# Patient Record
Sex: MALE | Race: WHITE | NOT HISPANIC OR LATINO | Employment: FULL TIME | URBAN - METROPOLITAN AREA
[De-identification: names, ages, dates, MRNs, and addresses within clinical notes are randomized per-mention and may not be internally consistent; named-entity substitution may affect disease eponyms.]

---

## 2019-10-28 ENCOUNTER — TELEPHONE (OUTPATIENT)
Dept: SCHEDULING | Facility: IMAGING CENTER | Age: 49
End: 2019-10-28

## 2019-11-07 ENCOUNTER — OFFICE VISIT (OUTPATIENT)
Dept: MEDICAL GROUP | Facility: CLINIC | Age: 49
End: 2019-11-07
Payer: COMMERCIAL

## 2019-11-07 VITALS
BODY MASS INDEX: 28.28 KG/M2 | SYSTOLIC BLOOD PRESSURE: 138 MMHG | WEIGHT: 202 LBS | TEMPERATURE: 98 F | HEIGHT: 71 IN | HEART RATE: 65 BPM | RESPIRATION RATE: 14 BRPM | OXYGEN SATURATION: 99 % | DIASTOLIC BLOOD PRESSURE: 92 MMHG

## 2019-11-07 DIAGNOSIS — Z28.20 VACCINE REFUSED BY PATIENT: ICD-10-CM

## 2019-11-07 DIAGNOSIS — Z12.11 SCREENING FOR COLON CANCER: ICD-10-CM

## 2019-11-07 DIAGNOSIS — Z76.89 ESTABLISHING CARE WITH NEW DOCTOR, ENCOUNTER FOR: ICD-10-CM

## 2019-11-07 DIAGNOSIS — F41.1 GAD (GENERALIZED ANXIETY DISORDER): ICD-10-CM

## 2019-11-07 PROCEDURE — 99203 OFFICE O/P NEW LOW 30 MIN: CPT | Performed by: NURSE PRACTITIONER

## 2019-11-07 RX ORDER — HYDROXYZINE PAMOATE 25 MG/1
CAPSULE ORAL
Refills: 0 | COMMUNITY
Start: 2019-09-30 | End: 2019-11-07 | Stop reason: SDUPTHER

## 2019-11-07 RX ORDER — HYDROXYZINE PAMOATE 25 MG/1
25 CAPSULE ORAL 3 TIMES DAILY PRN
Qty: 90 CAP | Refills: 1 | Status: SHIPPED | OUTPATIENT
Start: 2019-11-07 | End: 2020-08-11 | Stop reason: SDUPTHER

## 2019-11-08 NOTE — PATIENT INSTRUCTIONS
Your medical care was provided today by: GIGI Fernandez    Thank You for the opportunity to serve you.    You may receive a brief survey in the mail shortly regarding your visit today. Please take a few moments to complete the survey and return it; no postage is necessary. We are working to serve our patient population better, improve customer service and our patients overall experience and your input can help us to accomplish this. We thank you for your help and for the opportunity to serve you today and in the future.     Labs and Diagnostic Testing   Please note that if we have ordered labs or diagnostic testing, those results may be released to you on HERMEL DELORt prior to my review. While we do our best to review your results as soon as possible, there are times when you may see your results prior to provider review. Of course, if you ever have any questions or concerns about your results, please contact our clinic.     Special Instructions:  Always call 9-1-1 immediately if you develop a life threatening emergency.    Unless told otherwise please take all medications as directed and complete prescription therapies.     Watch for the following signs that require additional evaluation: progressive lethargy or unresponsiveness, localized pain (chest, abdomen), shortness of breath, painful breathing, progressive vomiting with weakness, bloody stools, or new rash.     If you are prescribed pain medication or any other medication that is sedating, do not take medication before or while operating a vehicle or heavy machinery or equipment due to potential side effects such as drowsiness and/or dizziness.

## 2019-11-08 NOTE — ASSESSMENT & PLAN NOTE
This is a chronic problem for which patient has been prescribed Zoloft 50 mg daily and also as needed hydroxyzine.  Patient reports that while he does feel like the Zoloft has helped his anxiety, he does believe it could be increased to help him better control his anxiety.  Denies any suicidal ideation.  He does request a refill of his medication today.

## 2019-11-08 NOTE — PROGRESS NOTES
Subjective:     Keenan Morgan is a 48 y.o. male here today for evaluation and management the following:        EVIE (generalized anxiety disorder)  This is a chronic problem for which patient has been prescribed Zoloft 50 mg daily and also as needed hydroxyzine.  Patient reports that while he does feel like the Zoloft has helped his anxiety, he does believe it could be increased to help him better control his anxiety.  Denies any suicidal ideation.  He does request a refill of his medication today.       Current medicines (including changes today)  Current Outpatient Medications   Medication Sig Dispense Refill   • sertraline (ZOLOFT) 50 MG Tab Take 2 Tabs by mouth every day. 180 Tab 1   • hydrOXYzine pamoate (VISTARIL) 25 MG Cap Take 1 Cap by mouth 3 times a day as needed for Itching. 90 Cap 1     No current facility-administered medications for this visit.        He  has a past medical history of Hypertension and Pain.    He  has a past surgical history that includes hip arthroscopy (Left, 12/2/2016); femoral neck osteoplasty (Left, 12/2/2016); and acetabular osteoplasty (Left, 12/2/2016).     Social History     Socioeconomic History   • Marital status:      Spouse name: Not on file   • Number of children: Not on file   • Years of education: Not on file   • Highest education level: Not on file   Occupational History   • Not on file   Social Needs   • Financial resource strain: Not on file   • Food insecurity:     Worry: Not on file     Inability: Not on file   • Transportation needs:     Medical: Not on file     Non-medical: Not on file   Tobacco Use   • Smoking status: Never Smoker   • Smokeless tobacco: Current User     Types: Snuff, Chew   • Tobacco comment: 2 cans/week; used since age 6 years    Substance and Sexual Activity   • Alcohol use: Not Currently     Comment: 6-8 beers per day, stopped alcohol 2011   • Drug use: No   • Sexual activity: Yes   Lifestyle   • Physical activity:      "Days per week: Not on file     Minutes per session: Not on file   • Stress: Not on file   Relationships   • Social connections:     Talks on phone: Not on file     Gets together: Not on file     Attends Scientologist service: Not on file     Active member of club or organization: Not on file     Attends meetings of clubs or organizations: Not on file     Relationship status: Not on file   • Intimate partner violence:     Fear of current or ex partner: Not on file     Emotionally abused: Not on file     Physically abused: Not on file     Forced sexual activity: Not on file   Other Topics Concern   • Not on file   Social History Narrative   • Not on file       Family History   Family history unknown: Yes         ROS  Positive for anxiety, denies suicidal ideation or depression.  No fever, no chest pain, no shortness of breath, no abdominal pain, no rashes    All other systems reviewed and are negative.        Objective:     /92 (BP Location: Left arm, Patient Position: Sitting, BP Cuff Size: Adult)   Pulse 65   Temp 36.7 °C (98 °F) (Temporal)   Resp 14   Ht 1.803 m (5' 11\")   Wt 91.6 kg (202 lb)   SpO2 99%  Body mass index is 28.17 kg/m².    Physical Exam:   Constitutional: Alert, no distress.  Eye: Equal, round and reactive, conjunctiva clear, lids normal.   ENMT: Lips without lesions, oropharynx clear.   Neck: Trachea midline, no masses, no thyromegaly. No cervical or supraclavicular lymphadenopathy  Respiratory: Unlabored respiratory effort, lungs clear to auscultation, no wheezes, no ronchi.  Cardiovascular: Normal S1, S2, no murmur, no edema.   Abdomen: Soft, non-tender, no masses, no hepatosplenomegaly. Normal bowel sounds.   Skin: Warm, dry, good turgor, no rashes in visible areas.  Neuro:  normal sensation in extremities.   Psych: Alert and oriented x3, normal affect and mood.        Assessment and Plan:   The following treatment plan was discussed    1. Establishing care with new doctor, encounter " for    2. EVIE (generalized anxiety disorder)  Discussed with patient risks and benefits of increasing medication.  At this time patient will increase to Zoloft 100 mg daily.  He may trial taking just 1-1/2 pills for the next 6 weeks to trial 75 mg daily.  If symptoms could still use improving he will increase to 100 mg.  Discussed possible side effects.  Advised patient to complete fasting labs although he is not sure he will be able to due to his cost.  He will follow-up in 3 to 6 months or as needed.  - TSH WITH REFLEX TO FT4; Future  - Lipid Profile; Future  - Comp Metabolic Panel; Future  - HEMOGLOBIN A1C; Future  - sertraline (ZOLOFT) 50 MG Tab; Take 2 Tabs by mouth every day.  Dispense: 180 Tab; Refill: 1  - hydrOXYzine pamoate (VISTARIL) 25 MG Cap; Take 1 Cap by mouth 3 times a day as needed for Itching.  Dispense: 90 Cap; Refill: 1    3. Screening for colon cancer  - OCCULT BLOOD FECES IMMUNOASSAY (FIT); Future    4. Vaccine refused by patient  Declines all recommended vaccines.       Reviewed indication, dosage, usage and potential adverse effects of prescribed medications. Patient appears to understand, verbalizes understanding and is willing to try medications as prescribed.      Reviewed risks and benefits of treatment plan. Patient verbally agrees to plan of care.       Followup: Return in about 3 months (around 2/7/2020).    [email or web address].     PLEASE NOTE: This dictation was created using voice recognition software. I have made every reasonable attempt to correct obvious errors, but I expect that there may be errors of grammar and possibly content that I did not discover prior finalizing this note.

## 2020-04-07 ENCOUNTER — TELEPHONE (OUTPATIENT)
Dept: HEALTH INFORMATION MANAGEMENT | Facility: OTHER | Age: 50
End: 2020-04-07

## 2020-04-07 ENCOUNTER — OFFICE VISIT (OUTPATIENT)
Dept: URGENT CARE | Facility: MEDICAL CENTER | Age: 50
End: 2020-04-07
Payer: COMMERCIAL

## 2020-04-07 VITALS — HEART RATE: 44 BPM | BODY MASS INDEX: 33.99 KG/M2 | HEIGHT: 61 IN | WEIGHT: 180 LBS

## 2020-04-07 DIAGNOSIS — F17.200 NICOTINE DEPENDENCE WITH CURRENT USE: ICD-10-CM

## 2020-04-07 DIAGNOSIS — R05.9 COUGH: ICD-10-CM

## 2020-04-07 DIAGNOSIS — J01.40 ACUTE PANSINUSITIS, RECURRENCE NOT SPECIFIED: ICD-10-CM

## 2020-04-07 PROBLEM — F41.9 ANXIETY: Status: ACTIVE | Noted: 2019-02-01

## 2020-04-07 PROBLEM — Z80.42 FAMILY HISTORY OF MALIGNANT NEOPLASM OF PROSTATE: Status: ACTIVE | Noted: 2019-02-01

## 2020-04-07 PROCEDURE — 99203 OFFICE O/P NEW LOW 30 MIN: CPT | Mod: 95,CR | Performed by: PHYSICIAN ASSISTANT

## 2020-04-07 RX ORDER — BENZONATATE 100 MG/1
200 CAPSULE ORAL 3 TIMES DAILY PRN
Qty: 60 CAP | Refills: 0 | Status: SHIPPED | OUTPATIENT
Start: 2020-04-07 | End: 2020-08-11

## 2020-04-07 RX ORDER — DOXYCYCLINE HYCLATE 100 MG
100 TABLET ORAL 2 TIMES DAILY
Qty: 10 TAB | Refills: 0 | Status: SHIPPED | OUTPATIENT
Start: 2020-04-07 | End: 2020-04-12

## 2020-04-07 NOTE — TELEPHONE ENCOUNTER
1. Caller Name: Keenan Morgan                     Call Back Number: [phone]  [facility] PCP or Specialty Provider: No            2.  Does patient have any active symptoms of respiratory illness (fever OR cough OR shortness of breath OR sore throat)? Yes, the patient reports the following respiratory symptoms: cough and congestion x 1 month.    3.  Does patient have any comoribidities? None     4.  Has the patient traveled in the last 14 days OR had any known contact with someone who is suspected or confirmed to have COVID-19?  No.    5. Disposition: Offered patient virtual visit to limit potential exposure to others; patient also given self care instructions    Note routed to [facility] Provider: VONDA only.

## 2020-04-07 NOTE — PROGRESS NOTES
"Subjective:     Keenan Morgan  is a 49 y.o. male who presents for Cough (congestion x 1 mo)       HPI      ROS  Allergies   Allergen Reactions   • Aspirin Anaphylaxis   • Guaifenesin Anaphylaxis   • Penicillin [Penicillin G Potassium] Anaphylaxis     Past medical history, family history, surgical history and social history are reviewed and updated in the record today.     Objective:   Pulse (!) 44 Comment: pt took manual  Ht 1.549 m (5' 1\")   Wt 81.6 kg (180 lb)   BMI 34.01 kg/m²   Physical Exam     Assessment/Plan:   1. Acute pansinusitis, recurrence not specified  - doxycycline (VIBRAMYCIN) 100 MG Tab; Take 1 Tab by mouth 2 times a day for 5 days.  Dispense: 10 Tab; Refill: 0    2. Cough  - benzonatate (TESSALON) 100 MG Cap; Take 2 Caps by mouth 3 times a day as needed.  Dispense: 60 Cap; Refill: 0    3. Nicotine dependence with current use    Differential diagnosis, natural history, supportive care, and indications for immediate follow-up discussed.  Red flag warning symptoms and strict ER/follow-up precautions given.  The patient demonstrated a good understanding and agreed with the treatment plan.  Please note that this note was created using voice recognition speech to text software. Every effort has been made to correct obvious errors.  However, I expect there are errors of grammar and possibly context that were not discovered prior to finalizing the note  AGUSTIN Beck PA-C  "

## 2020-04-07 NOTE — PROGRESS NOTES
Telemedicine Visit: Established Patient     This encounter was conducted via Zoom .   Verbal consent was obtained. Patient's identity was verified.    Subjective:   CC:   Nasal congestion and cough x 1 month    Keenan Morgan is a 49 y.o. male presenting for evaluation and management of:    Mr. Morgan has seen via telemedicine on platform  noted above.  Patient reports 1 month history of nasal congestion with facial pain and pressure and nonproductive cough.  Patient denies fever, chills, shortness of breath.  Over-the-counter DayQuil with minimal improvement in symptoms.He states that his wife babysits in the home and prior to the onset of his symptoms, several of the children were being treated for sinus infections.       Patient does chew tobacco.     Denies travel outside of the area in the past 14 days.  Patient has had no known exposure to confirmed or suspected carla 30 minutese of COVID-19    ROS   Denies any recent fevers or chills. No nausea or vomiting. No chest pains or shortness of breath.       Allergies   Allergen Reactions   • Aspirin Anaphylaxis   • Guaifenesin Anaphylaxis   • Penicillin [Penicillin G Potassium] Anaphylaxis       Current medicines (including changes today)  Current Outpatient Medications   Medication Sig Dispense Refill   • doxycycline (VIBRAMYCIN) 100 MG Tab Take 1 Tab by mouth 2 times a day for 5 days. 10 Tab 0   • benzonatate (TESSALON) 100 MG Cap Take 2 Caps by mouth 3 times a day as needed. 60 Cap 0   • sertraline (ZOLOFT) 50 MG Tab Take 2 Tabs by mouth every day. 180 Tab 1   • hydrOXYzine pamoate (VISTARIL) 25 MG Cap Take 1 Cap by mouth 3 times a day as needed for Itching. (Patient not taking: Reported on 4/7/2020) 90 Cap 1     No current facility-administered medications for this visit.        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  He  has a past surgical history that includes hip arthroscopy (Left, 12/2/2016); femoral neck osteoplasty (Left, 12/2/2016); and acetabular osteoplasty (Left, 12/2/2016).       Objective:   Vitals obtained by patient:  Pulse (!) 44 Comment: pt took manual  Ht 1.549 m (5' 1\")   Wt 81.6 kg (180 lb)   BMI 34.01 kg/m²       Physical Exam:  Constitutional: Alert, no distress, well-groomed. Patient does sound as if he is experiencing nasal congestion  Skin: No rashes in visible areas.  Eye: Round. Conjunctiva clear, lids normal. No icterus.   ENMT: Lips pink without lesions, good dentition, moist mucous membranes. Phonation normal.  Neck: No masses, no thyromegaly. Moves freely without pain.  CV: Pulse as reported by patient  Respiratory: Unlabored respiratory effort, no cough or audible wheeze  Psych: Alert and oriented x3, normal affect and mood.       Assessment and Plan:   The following treatment plan was discussed:     1. Acute pansinusitis, recurrence not specified  - doxycycline (VIBRAMYCIN) 100 MG Tab; Take 1 Tab by mouth 2 times a day for 5 days.  Dispense: 10 Tab; Refill: 0    2. Cough  - benzonatate (TESSALON) 100 MG Cap; Take 2 Caps by mouth 3 times a day as needed.  Dispense: 60 Cap; Refill: 0    3. Nicotine dependence with current use    Patient will be treated with doxycycline as above (allergic to penicillin).  Recommend nasal saline rinse and over-the-counter Flonase nasal spray.  He is given Tessalon Perles as needed for cough.  Printed information on self-isolation will be provided in [facility] for patient's review.  Counseled on the importance of nicotine elimination or cessation.       Follow-up: Return in about 1 week (around 4/14/2020), or if symptoms worsen or fail to improve.    Red flag warning symptoms and " strict ER/follow-up precautions given.  The patient demonstrated a good understanding and agreed with the treatment plan.  Please note that this note was created using voice recognition speech to text software. Every effort has been made to correct obvious errors.  However, I expect there are errors of grammar and possibly context that were not discovered prior to finalizing the note  AGUSTIN Beck PA-C

## 2020-05-04 DIAGNOSIS — F41.1 GAD (GENERALIZED ANXIETY DISORDER): ICD-10-CM

## 2020-05-04 NOTE — TELEPHONE ENCOUNTER
Was the patient seen in the last year in this department? Yes    Does patient have an active prescription for medications requested? Yes    Received Request Via: Pharmacy    No visits with results within 1 Year(s) from this visit.   Latest known visit with results is:   Admission on 11/11/2008, Discharged on 11/12/2008   Component Date Value   • Sodium 11/12/2008 136    • Potassium 11/12/2008 4.1    • Chloride 11/12/2008 99    • Co2 11/12/2008 28    • Glucose 11/12/2008 113*   • Bun 11/12/2008 6*   • Creatinine 11/12/2008 0.8    • Calcium 11/12/2008 10.4*   • Albumin 11/12/2008 4.5    • Total Protein 11/12/2008 7.5    • Globulin 11/12/2008 3.0    • A-G Ratio 11/12/2008 1.5    • AST(SGOT) 11/12/2008 43    • ALT(SGPT) 11/12/2008 51*   • Alkaline Phosphatase 11/12/2008 19*   • Total Bilirubin 11/12/2008 0.6    • WBC 11/12/2008 3.5*   • RBC 11/12/2008 4.45*   • Hemoglobin 11/12/2008 15.0    • Hematocrit 11/12/2008 42.4    • MCV 11/12/2008 95.2    • MCH 11/12/2008 33.7*   • MCHC 11/12/2008 35.4*   • RDW 11/12/2008 13.3    • Platelet Count 11/12/2008 236    • MPV 11/12/2008 7.5    • Neutrophils-Polys 11/12/2008 50.7    • Lymphocytes 11/12/2008 35.7    • Monocytes 11/12/2008 11.5*   • Eosinophils 11/12/2008 0.9    • Basophils 11/12/2008 1.2    • RBC Morphology 11/12/2008 Normal    • Lipase 11/12/2008 59*   • Troponin I 11/12/2008 <0.01    • CK-Mb 11/12/2008 0.4    • D-Dimer Screen 11/12/2008 <200    • Troponin I 11/12/2008 <0.01    • Free T-4 11/12/2008 0.95    • TSH 11/12/2008 1.460    • Cholesterol,Tot 11/12/2008 200*   • Triglycerides 11/12/2008 45    • HDL 11/12/2008 135*   • LDL 11/12/2008 56    • Sed Rate [facility] 11/12/2008 3    • Sed Rate [facility] 11/12/2008 4    • Troponin I 11/12/2008 0.02    ]

## 2020-08-11 ENCOUNTER — OFFICE VISIT (OUTPATIENT)
Dept: MEDICAL GROUP | Facility: CLINIC | Age: 50
End: 2020-08-11
Payer: COMMERCIAL

## 2020-08-11 VITALS
BODY MASS INDEX: 30.38 KG/M2 | TEMPERATURE: 99.2 F | RESPIRATION RATE: 16 BRPM | HEART RATE: 61 BPM | SYSTOLIC BLOOD PRESSURE: 128 MMHG | HEIGHT: 71 IN | WEIGHT: 217 LBS | DIASTOLIC BLOOD PRESSURE: 68 MMHG | OXYGEN SATURATION: 97 %

## 2020-08-11 DIAGNOSIS — F41.1 GENERALIZED ANXIETY DISORDER: ICD-10-CM

## 2020-08-11 DIAGNOSIS — J01.00 ACUTE NON-RECURRENT MAXILLARY SINUSITIS: ICD-10-CM

## 2020-08-11 DIAGNOSIS — Z00.00 WELLNESS EXAMINATION: ICD-10-CM

## 2020-08-11 PROBLEM — F41.9 ANXIETY: Status: RESOLVED | Noted: 2019-02-01 | Resolved: 2020-08-11

## 2020-08-11 PROCEDURE — 99213 OFFICE O/P EST LOW 20 MIN: CPT | Performed by: PHYSICIAN ASSISTANT

## 2020-08-11 RX ORDER — DOXYCYCLINE HYCLATE 100 MG
100 TABLET ORAL 2 TIMES DAILY
Qty: 14 TAB | Refills: 0 | Status: SHIPPED | OUTPATIENT
Start: 2020-08-11 | End: 2020-09-08

## 2020-08-11 RX ORDER — HYDROXYZINE PAMOATE 25 MG/1
25 CAPSULE ORAL 3 TIMES DAILY PRN
Qty: 90 CAP | Refills: 1 | Status: SHIPPED | OUTPATIENT
Start: 2020-08-11 | End: 2021-09-22

## 2020-08-11 ASSESSMENT — PATIENT HEALTH QUESTIONNAIRE - PHQ9: CLINICAL INTERPRETATION OF PHQ2 SCORE: 0

## 2020-08-11 NOTE — ASSESSMENT & PLAN NOTE
It is been more than a year since the patient has had his lab work completed.  I have ordered new fasting labs to be drawn at his convenience with follow-up in 1 month for results.

## 2020-08-11 NOTE — ASSESSMENT & PLAN NOTE
This is a new condition for this patient.  Sinus pain and pressure started approximately 1 week ago.  Has tried over-the-counter sinus medication without relief.  Tender to palpation over maxillary sinuses, does not radiate to frontal sinus.  Denies fever, sore throat, or ear pain.  Will prescribe [id number] mg twice a day for 7 days.  Recommend saline nasal spray to help loosen mucus.

## 2020-08-11 NOTE — PROGRESS NOTES
Chief Complaint   Patient presents with   • Establish Care   • Sinus Problem     infection x 1 week        HISTORY OF PRESENT ILLNESS: Patient is a 49 y.o. male established patient who presents today to discuss the following issues:    Acute non-recurrent maxillary sinusitis  This is a new condition for this patient.  Sinus pain and pressure started approximately 1 week ago.  Has tried over-the-counter sinus medication without relief.  Tender to palpation over maxillary sinuses, does not radiate to frontal sinus.  Denies fever, sore throat, or ear pain.  Will prescribe [id number] mg twice a day for 7 days.  Recommend saline nasal spray to help loosen mucus.    EVIE (generalized anxiety disorder)  Chronic condition for this patient for which he takes sertraline 100 mg daily.  This is working well for him.  Hydroxyzine 25 mg tablets 3 times a day as needed.  States this is controlling his anxiety well and takes this medication only when necessary.  We will refill his prescriptions today.    Wellness examination  It is been more than a year since the patient has had his lab work completed.  I have ordered new fasting labs to be drawn at his convenience with follow-up in 1 month for results.      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 14 Tab 0   • hydrOXYzine pamoate (VISTARIL) 25 MG Cap Take 1 Cap by mouth 3 times a day as needed for Anxiety. 90 Cap 1   • sertraline (ZOLOFT) 50 MG Tab Take 2 Tabs by mouth every day. 180 Tab 3      No current facility-administered medications for this visit.        No visits with results within 6 Month(s) from this visit.   Latest known visit with results is:   Admission on 11/11/2008, Discharged on 11/12/2008   Component Date Value Ref Range Status   • Sodium 11/12/2008 136  135 - 145 mmol/L Final   • Potassium 11/12/2008 4.1  3.6 - 5.5 mmol/L Final   • Chloride 11/12/2008 99  96 - 112 mmol/L Final   • Co2 11/12/2008 28  20 - 33 mmol/L Final   • Glucose 11/12/2008 113* 70 - 110 mg/dL Final   • Bun 11/12/2008 6* 8 - 22 mg/dL Final   • Creatinine 11/12/2008 0.8  0.5 - 1.4 mg/dL Final   • Calcium 11/12/2008 10.4* 8.4 - 10.2 mg/dL Final   • Albumin 11/12/2008 4.5  3.2 - 4.9 g/dL Final   • Total Protein 11/12/2008 7.5  6.0 - 8.2 g/dL Final   • Globulin 11/12/2008 3.0  1.9 - 3.5 g/dL Final   • A-G Ratio 11/12/2008 1.5  g/dL Final   • AST(SGOT) 11/12/2008 43  12 - 45 U/L Final   • ALT(SGPT) 11/12/2008 51* 2 - 50 U/L Final   • Alkaline Phosphatase 11/12/2008 19* 30 - 99 U/L Final   • Total Bilirubin 11/12/2008 0.6  0.1 - 1.5 mg/dL Final   • WBC 11/12/2008 3.5* 4.8 - 10.8 K/uL Final   • RBC 11/12/2008 4.45* 4.70 - 6.10 M/uL Final   • Hemoglobin 11/12/2008 15.0  14.0 - 18.0 g/dL Final   • Hematocrit 11/12/2008 42.4  42.0 - 52.0 % Final   • MCV 11/12/2008 95.2  79.0 - 98.0 fL Final   • MCH 11/12/2008 33.7* 27.0 - 33.0 pg Final   • MCHC 11/12/2008 35.4* 30.0 - 35.0 g/dL Final   • RDW 11/12/2008 13.3  12.0 - 16.2 % Final   • Platelet Count 11/12/2008 236  164 - 446 K/uL Final   • MPV 11/12/2008 7.5  6.7 - 10.4 fL Final   • Neutrophils-Polys 11/12/2008 50.7  44.0 - 72.0 % Final   • Lymphocytes 11/12/2008 35.7  22.0 - 41.0 % Final   • Monocytes 11/12/2008 11.5* 1.0 - 9.0 % Final   • Eosinophils 11/12/2008 0.9  0.0 - 6.0 % Final   • Basophils 11/12/2008 1.2  0.0 - 2.0 % Final   • RBC Morphology 11/12/2008 Normal   Final   • Lipase 11/12/2008 59* 7 - 58 U/L Final   • Troponin I 11/12/2008 <0.01  0.01 - 0.04 ng/mL Final     Comment: The Ultra Troponin I has replaced the                           troponin assay.  The Ultra Troponin I is a highly sensitive                           assay.  Please note the change in the reference range.                           Interpretive Ranges based on WHO recommendations                           ___________________________________________________                           Normal ultra TNI:  <0.04 ng/mL                           Clinical Correlation Indicated:  0.04 - 0.78 ng/mL                           Suggestive of MI:  >0.78 ng/mL   • CK-Mb 11/12/2008 0.4  0.0 - 5.0 ng/mL Final   • D-Dimer Screen 11/12/2008 <200  <238 ng/mL Final    Comment: A negative D-Dimer result when combined with a clinical                           assessment of low probability has been shown to have a high                           negative predictive value for DVT or PE.                           This assay should not be used independently to exclude or                           diagnose DVT or Pulmonary Embolism.                           This test methodology does not differentiate between DVT and                           PE when an elevation in results is demonstrated.   • Troponin I 11/12/2008 <0.01  0.01 - 0.04 ng/mL Final    Comment: The Ultra Troponin I has replaced the                           troponin assay.  The Ultra Troponin I is a highly sensitive                           assay.  Please note the change in the reference range.                           Interpretive Ranges based on WHO recommendations                           ___________________________________________________                           Normal ultra TNI:  <0.04 ng/mL                           Clinical Correlation Indicated:  0.04 - 0.78 ng/mL                           Suggestive of MI:  >0.78 ng/mL   • Free T-4 11/12/2008 0.95  0.89 - 1.76 ng/dL Final   • TSH 11/12/2008 1.460  0.350 - 5.500 uIU/mL Final   • Cholesterol,Tot 11/12/2008 200*  100 - 199 mg/dL Final   • Triglycerides 11/12/2008 45  0 - 149 mg/dL Final   • HDL 11/12/2008 135* 40 - 59 mg/dL Final   • LDL 11/12/2008 56  <100 mg/dL Final    Comment: LDL cholesterol goals and cutpoints for therapeutic lifestyle                           changes (TLC) and drug therapy in different risk categories.                           ______________________________________________________________________                           Risk Category      LDL Goal      LDL Level to      LDL Level to                           Initiate TLC      Consider Drug                           Therapy                           ______________________________________________________________________                           CHD or CHD Risk    <100 mg/dL    >=100 mg/dL       >=130 mg/dL                           Equivalents                                        (100-129 mg/dL                           (10-year risk >20%)                                 drug optional)                           2+ Risk Factors    <130 mg/dL    >=130 mg/dL       10-year risk 10-20%:                           (10-year risk <=20%)                                 >=130 mg/dL                           -------------------                           10-year risk <10%:                           >=160 mg/dL                           0-1 Risk Factor    <160 mg/dL    >=160 mg/dL       >=190 mg/dL                           (10-year risk <10%)                                (160-189 mg/dL:                           LDL-lowering drug                           optional)                           Guidelines referenced from the National Cholesterol Education Program, ATP                           III guidelines, NIH Publication No. , May 2001.   • Sed Rate Westergren 11/12/2008 3  0 - 15 mm/hour Final   • Sed Rate Westergren 11/12/2008 4  0 - 15 mm/hour Final   • Troponin I 11/12/2008 0.02  0.01 - 0.04 ng/mL Final    Comment: The Ultra Troponin I has  replaced the                           troponin assay.  The Ultra Troponin I is a highly sensitive                           assay.  Please note the change in the reference range.                           Interpretive Ranges based on WHO recommendations                           ___________________________________________________                           Normal ultra TNI:  <0.04 ng/mL                           Clinical Correlation Indicated:  0.04 - 0.78 ng/mL                           Suggestive of MI:  >0.78 ng/mL   ]    The ASCVD Risk score (Jacques CHAVIS Jr., et al., 2013) failed to calculate for the following reasons:    Cannot find a previous HDL lab    Cannot find a previous total cholesterol lab    Social History     Tobacco Use   • Smoking status: Never Smoker   • Smokeless tobacco: Current User     Types: Snuff, Chew   • Tobacco comment: 2 cans/week; used since age 6 years    Substance Use Topics   • Alcohol use: Not Currently     Comment: 6-8 beers per day, stopped alcohol 2011   • Drug use: No       Family Status   Relation Name Status   • Mo unknown Alive   • Fa unknown Alive   • Bro unknown Alive     Family History   Family history unknown: Yes       No LMP for male patient.    Health Maintenance Summary                [facility] CAN[facility]ER SCREENING ANNUAL FIT Postponed 9/8/2020 Originally 12/30/2018. Patient Refused    IMM ZOSTER VACCINES Postponed 9/8/2020 Originally 12/30/2018. Patient Refused    IMM DTaP/Tdap/Td Vaccine Postponed 11/7/2020 Originally 12/30/1987. Patient Refused    IMM INFLUENZA Next Due 9/1/2020            Review of Systems:   Constitutional: Negative for fever, chills, weight change, fatigue, loss of appetite.  HENT: Positive for congestion, sinus pressure.  Negative for ear pain, nosebleeds, odynophagia, sore throat or changes in taste.    Eyes: Negative for vision changes.   Ears: Negative for recent changes in hearing, pain or discharge.  Neck: Negative for pain, swelling, lumps or  "goiter.  Respiratory: Negative for cough, sputum production, shortness of breath and wheezing.    Cardiovascular: Negative for chest pain, palpitations, orthopnea and leg swelling.   Gastrointestinal: Negative for constipation, diarrhea, heartburn, dysphagia, nausea, vomiting or abdominal pain.   Genitourinary: Negative for dysuria, urgency and frequency.   Musculoskeletal: Negative for myalgias, joint pain, and back pain.  Skin: Negative for skin, hair or nail changes, rash, itching.   Neurological: Negative for dizziness, tingling, tremors, sensory change, gait/coordination changes, focal weakness and headaches.   Endo/Heme/Allergies: Does not bruise/bleed easily.   Psychiatric/Behavioral: Negative for depression, suicidal ideas and memory loss.  The patient is not nervous/anxious and does not have insomnia.    All other systems reviewed and are negative except as in HPI.    Exam:  /68 (BP Location: Right arm, Patient Position: Sitting, BP Cuff Size: Adult)   Pulse 61   Temp 37.3 °C (99.2 °F) (Temporal)   Resp 16   Ht 1.803 m (5' 11\")   Wt 98.4 kg (217 lb)   SpO2 97%  Body mass index is 30.27 kg/m².  General:  Well nourished, obese male. No apparent distress.  Head: Grossly normal.  Eyes: EOM intact, PERRL, conjunctiva non-injected, sclera non-icteric.  Ears: Pam pinnae, external auditory canals, TM injected and dull bilaterally.    Neck: Supple with no cervical lymphadenopathy, JVD, palpable thyroid nodules or carotid bruits.  Pulmonary: Clear to ausculation bilaterally. Normal effort. No rales, ronchi, or wheezing.  Cardiovascular: Regular rate and rhythm without murmur, rub or gallop.   Extremities: Full range of motion. Warm and well perfused with no edema.  Skin: Intact with no obvious rashes or lesions.  Neuro: Grossly intact.  Psych: Alert and oriented x 3.  Appropriately dressed. Mood and affect appropriate.      Assessment/Plan:  1. Acute non-recurrent maxillary sinusitis  doxycycline " (VIBRAMYCIN) 100 MG Tab   2. EVIE (generalized anxiety disorder)  hydrOXYzine pamoate (VISTARIL) 25 MG Cap    sertraline (ZOLOFT) 50 MG Tab   3. Wellness examination  CBC WITH DIFFERENTIAL    Comp Metabolic Panel    HEMOGLOBIN A1C    TSH    FREE THYROXINE    VITAMIN D,25 HYDROXY    Lipid Profile       Reviewed risks and benefits of treatment plan. Patient verbally agrees to plan of care.     Return in about 4 weeks (around 9/8/2020), or if symptoms worsen or fail to improve, for labs.    Please note that this dictation was created using voice recognition software. I have made every reasonable attempt to correct obvious errors, but I expect that there are errors of grammar and possibly content that I did not discover before finalizing the note.

## 2020-08-11 NOTE — ASSESSMENT & PLAN NOTE
Chronic condition for this patient for which he takes sertraline 100 mg daily.  This is working well for him.  Hydroxyzine 25 mg tablets 3 times a day as needed.  States this is controlling his anxiety well and takes this medication only when necessary.  We will refill his prescriptions today.

## 2020-08-14 ENCOUNTER — NON-PROVIDER VISIT (OUTPATIENT)
Dept: MEDICAL GROUP | Facility: CLINIC | Age: 50
End: 2020-08-14
Payer: COMMERCIAL

## 2020-08-14 ENCOUNTER — HOSPITAL ENCOUNTER (OUTPATIENT)
Facility: MEDICAL CENTER | Age: 50
End: 2020-08-14
Attending: PHYSICIAN ASSISTANT
Payer: COMMERCIAL

## 2020-08-14 DIAGNOSIS — Z00.00 WELLNESS EXAMINATION: ICD-10-CM

## 2020-08-14 LAB
25(OH)D3 SERPL-MCNC: 23 NG/ML (ref 30–100)
ALBUMIN SERPL BCP-MCNC: 4.8 G/DL (ref 3.2–4.9)
ALBUMIN/GLOB SERPL: 1.9 G/DL
ALP SERPL-CCNC: 42 U/L (ref 30–99)
ALT SERPL-CCNC: 19 U/L (ref 2–50)
ANION GAP SERPL CALC-SCNC: 13 MMOL/L (ref 7–16)
AST SERPL-CCNC: 15 U/L (ref 12–45)
BASOPHILS # BLD AUTO: 0.9 % (ref 0–1.8)
BASOPHILS # BLD: 0.04 K/UL (ref 0–0.12)
BILIRUB SERPL-MCNC: 0.4 MG/DL (ref 0.1–1.5)
BUN SERPL-MCNC: 9 MG/DL (ref 8–22)
CALCIUM SERPL-MCNC: 9.4 MG/DL (ref 8.5–10.5)
CHLORIDE SERPL-SCNC: 96 MMOL/L (ref 96–112)
CHOLEST SERPL-MCNC: 183 MG/DL (ref 100–199)
CO2 SERPL-SCNC: 24 MMOL/L (ref 20–33)
CREAT SERPL-MCNC: 0.82 MG/DL (ref 0.5–1.4)
EOSINOPHIL # BLD AUTO: 0.17 K/UL (ref 0–0.51)
EOSINOPHIL NFR BLD: 3.7 % (ref 0–6.9)
ERYTHROCYTE [DISTWIDTH] IN BLOOD BY AUTOMATED COUNT: 42.9 FL (ref 35.9–50)
EST. AVERAGE GLUCOSE BLD GHB EST-MCNC: 108 MG/DL
GLOBULIN SER CALC-MCNC: 2.5 G/DL (ref 1.9–3.5)
GLUCOSE SERPL-MCNC: 108 MG/DL (ref 65–99)
HBA1C MFR BLD: 5.4 % (ref 0–5.6)
HCT VFR BLD AUTO: 46.5 % (ref 42–52)
HDLC SERPL-MCNC: 34 MG/DL
HGB BLD-MCNC: 15.4 G/DL (ref 14–18)
IMM GRANULOCYTES # BLD AUTO: 0.03 K/UL (ref 0–0.11)
IMM GRANULOCYTES NFR BLD AUTO: 0.7 % (ref 0–0.9)
LDLC SERPL CALC-MCNC: 118 MG/DL
LYMPHOCYTES # BLD AUTO: 1.72 K/UL (ref 1–4.8)
LYMPHOCYTES NFR BLD: 37.6 % (ref 22–41)
MCH RBC QN AUTO: 29.9 PG (ref 27–33)
MCHC RBC AUTO-ENTMCNC: 33.1 G/DL (ref 33.7–35.3)
MCV RBC AUTO: 90.3 FL (ref 81.4–97.8)
MONOCYTES # BLD AUTO: 0.36 K/UL (ref 0–0.85)
MONOCYTES NFR BLD AUTO: 7.9 % (ref 0–13.4)
NEUTROPHILS # BLD AUTO: 2.25 K/UL (ref 1.82–7.42)
NEUTROPHILS NFR BLD: 49.2 % (ref 44–72)
NRBC # BLD AUTO: 0 K/UL
NRBC BLD-RTO: 0 /100 WBC
PLATELET # BLD AUTO: 281 K/UL (ref 164–446)
PMV BLD AUTO: 10.7 FL (ref 9–12.9)
POTASSIUM SERPL-SCNC: 4.6 MMOL/L (ref 3.6–5.5)
PROT SERPL-MCNC: 7.3 G/DL (ref 6–8.2)
RBC # BLD AUTO: 5.15 M/UL (ref 4.7–6.1)
SODIUM SERPL-SCNC: 133 MMOL/L (ref 135–145)
T4 FREE SERPL-MCNC: 1.26 NG/DL (ref 0.93–1.7)
TRIGL SERPL-MCNC: 157 MG/DL (ref 0–149)
TSH SERPL DL<=0.005 MIU/L-ACNC: 1.25 UIU/ML (ref 0.38–5.33)
WBC # BLD AUTO: 4.6 K/UL (ref 4.8–10.8)

## 2020-08-14 PROCEDURE — 84439 ASSAY OF FREE THYROXINE: CPT

## 2020-08-14 PROCEDURE — 82306 VITAMIN D 25 HYDROXY: CPT

## 2020-08-14 PROCEDURE — 80061 LIPID PANEL: CPT

## 2020-08-14 PROCEDURE — 83036 HEMOGLOBIN GLYCOSYLATED A1C: CPT

## 2020-08-14 PROCEDURE — 85025 COMPLETE CBC W/AUTO DIFF WBC: CPT

## 2020-08-14 PROCEDURE — 84443 ASSAY THYROID STIM HORMONE: CPT

## 2020-08-14 PROCEDURE — 36415 COLL VENOUS BLD VENIPUNCTURE: CPT | Performed by: PHYSICIAN ASSISTANT

## 2020-08-14 PROCEDURE — 80053 COMPREHEN METABOLIC PANEL: CPT

## 2020-08-19 NOTE — RESULT ENCOUNTER NOTE
Please call the patient and let him know the following:    - Your vitamin D level is low.  I recommend taking over-the-counter vitamin D 2000 units daily.  - Your thyroid labs are within normal limits.  -  Your triglycerides and LDL are high from your cholesterol panel.  These can be controlled with cutting back on carbohydrates and increasing exercise.  We will try to manage this through diet and exercise before having the discussion about medication management.  -Your metabolic panel shows an increased fasting blood sugar.  All other levels are within normal limits.  Any other results that are highlighted have been reviewed and are nothing to be worried about.  The blood sugar level can also be controlled by diet and exercise.  – We will discuss this in further detail at your next appointment on September 8, 2020.

## 2020-09-08 ENCOUNTER — OFFICE VISIT (OUTPATIENT)
Dept: MEDICAL GROUP | Facility: CLINIC | Age: 50
End: 2020-09-08
Payer: COMMERCIAL

## 2020-09-08 VITALS
WEIGHT: 215 LBS | OXYGEN SATURATION: 98 % | HEART RATE: 54 BPM | SYSTOLIC BLOOD PRESSURE: 100 MMHG | DIASTOLIC BLOOD PRESSURE: 80 MMHG | HEIGHT: 71 IN | BODY MASS INDEX: 30.1 KG/M2 | TEMPERATURE: 98 F

## 2020-09-08 DIAGNOSIS — R09.81 CHRONIC NASAL CONGESTION: ICD-10-CM

## 2020-09-08 DIAGNOSIS — Z00.00 WELLNESS EXAMINATION: ICD-10-CM

## 2020-09-08 DIAGNOSIS — Z80.42 FAMILY HISTORY OF MALIGNANT NEOPLASM OF PROSTATE: ICD-10-CM

## 2020-09-08 DIAGNOSIS — E55.9 VITAMIN D DEFICIENCY: ICD-10-CM

## 2020-09-08 DIAGNOSIS — F41.1 GENERALIZED ANXIETY DISORDER: ICD-10-CM

## 2020-09-08 PROBLEM — J01.00 ACUTE NON-RECURRENT MAXILLARY SINUSITIS: Status: RESOLVED | Noted: 2020-08-11 | Resolved: 2020-09-08

## 2020-09-08 PROCEDURE — 99213 OFFICE O/P EST LOW 20 MIN: CPT | Performed by: PHYSICIAN ASSISTANT

## 2020-09-08 ASSESSMENT — FIBROSIS 4 INDEX: FIB4 SCORE: 0.62

## 2020-09-08 NOTE — ASSESSMENT & PLAN NOTE
Patient had a maxillary sinusitis on his last visit which has since resolved after course of antibiotics.  He now states he gets sinus pressure in his maxillary sinuses that comes and goes.  He said he will be congested for a day or 2 and then it goes away and then it comes back.  He cannot link it to any specific environmental exposure.  I have recommended a trial of over-the-counter antihistamine of his choosing.  If this trial is successful we will prescribe.  He will keep me informed via my chart if this is helpful.  We will continue to follow.

## 2020-09-08 NOTE — ASSESSMENT & PLAN NOTE
Diagnosed by lab work August 2020.  Level at that time was 23.  I have recommended over-the-counter supplementation of 2000 units daily.  We will recheck in 1 year.  We will continue to monitor.

## 2020-09-08 NOTE — PROGRESS NOTES
Chief Complaint   Patient presents with   • Follow-Up     Lab [facility]       HISTORY OF PRESENT ILLNESS: Patient is a 49 y.o. male established patient who presents today to discuss the following issues:    EVIE (generalized anxiety disorder)  Chronic condition for this patient.  He states he has had been having more anxiety recently and has bumped up his sertraline dose on his own to 150 mg a day.  He states this in addition to his Vistaril 25 mg 3 times daily as needed has been much better in controlling his symptoms.  I have updated his sertraline prescription with the pharmacy to reflect the current dose.  We will continue to follow.    Chronic nasal congestion  Patient had a maxillary sinusitis on his last visit which has since resolved after course of antibiotics.  He now states he gets sinus pressure in his maxillary sinuses that comes and goes.  He said he will be congested for a day or 2 and then it goes away and then it comes back.  He cannot link it to any specific environmental exposure.  I have recommended a trial of over-the-counter antihistamine of his choosing.  If this trial is successful we will prescribe.  He will keep me informed via my chart if this is helpful.  We will continue to follow.    Vitamin D deficiency  Diagnosed by lab work August 2020.  Level at that time was 23.  I have recommended over-the-counter supplementation of 2000 units daily.  We will recheck in 1 year.  We will continue to monitor.    Wellness examination  Routine lab work ordered to be completed 1 week before his next appointment in 1 year.      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 270 Tab 2   • hydrOXYzine pamoate (VISTARIL) 25 MG Cap Take 1 Cap by mouth 3 times a day as needed for Anxiety. 90 Cap 1     No current facility-administered medications for this visit.        Hospital Outpatient Visit on 08/14/2020   Component Date Value Ref Range Status   • Cholesterol,Tot 08/14/2020 183  100 - 199 mg/dL Final   • Triglycerides 08/14/2020 157* 0 - 149 mg/dL Final   • HDL 08/14/2020 34* >=40 mg/dL Final   • LDL 08/14/2020 118* <100 mg/dL Final   • 25-Hydroxy   Vitamin D 25 08/14/2020 23* 30 - 100 ng/mL Final    Comment: Adult Ranges:   <20 ng/mL - Deficiency  20-29 ng/mL - Insufficiency   ng/mL - Sufficiency  Effective 3/18/2020, this electrochemiluminescence binding assay is  performed using Roche marine e immunoassay analyzer.  The Ele[employer]ys Vitamin D  total II assay is intended for the quantitative determination of total 25  hydroxyvitamin D in human serum and plasma. This assay is to be used as an  aid in the assessment of vitamin D sufficiency in adults.     • Free T-4 08/14/2020 1.26  0.93 - 1.70 ng/dL Final    Please note new FT4 reference range effective 4/29/2020.   • TSH 08/14/2020 1.250  0.380 - 5.330 uIU/mL Final    Comment: Please note new reference ranges effective 12/14/2017 10:00 AM  Pregnant Females, 1st Trimester  0.050-3.700  Pregnant Females, 2nd Trimester  0.310-4.350  Pregnant Females, 3rd Trimester  0.410-5.180     • Glycohemoglobin 08/14/2020 5.4  0.0 - 5.6 % Final    Comment: Increased risk for diabetes:  5.7 -6.4%  Diabetes:  >6.4%  Glycemic control for adults with diabetes:  <7.0%  The above interpretations are per ADA guidelines.  Diagnosis  of diabetes mellitus on the basis of elevated Hemoglobin A1c  should be confirmed by repeating the Hb A1c test.     • Est  Avg Glucose 08/14/2020 108  mg/dL Final    Comment: The eAG calculation is based on the A1c-Derived Daily Glucose  (ADAG) study.  See the ADA's website for additional information.     • Sodium 08/14/2020 133* 135 - 145 mmol/L Final   • Potassium 08/14/2020 4.6  3.6 - 5.5 mmol/L Final   • Chloride 08/14/2020 96  96 - 112 mmol/L Final   • Co2 08/14/2020 24  20 - 33 mmol/L Final   • Anion Gap 08/14/2020 13.0  7.0 - 16.0 Final   • Glucose 08/14/2020 108* 65 - 99 mg/dL Final   • Bun 08/14/2020 9  8 - 22 mg/dL Final   • Creatinine 08/14/2020 0.82  0.50 - 1.40 mg/dL Final   • Calcium 08/14/2020 9.4  8.5 - 10.5 mg/dL Final   • AST(SGOT) 08/14/2020 15  12 - 45 U/L Final   • ALT(SGPT) 08/14/2020 19  2 - 50 U/L Final   • Alkaline Phosphatase 08/14/2020 42  30 - 99 U/L Final   • Total Bilirubin 08/14/2020 0.4  0.1 - 1.5 mg/dL Final   • Albumin 08/14/2020 4.8  3.2 - 4.9 g/dL Final   • Total Protein 08/14/2020 7.3  6.0 - 8.2 g/dL Final   • Globulin 08/14/2020 2.5  1.9 - 3.5 g/dL Final   • A-G Ratio 08/14/2020 1.9  g/dL Final   • WBC 08/14/2020 4.6* 4.8 - 10.8 K/uL Final   • RBC 08/14/2020 5.15  4.70 - 6.10 M/uL Final   • Hemoglobin 08/14/2020 15.4  14.0 - 18.0 g/dL Final   • Hematocrit 08/14/2020 46.5  42.0 - 52.0 % Final   • MCV 08/14/2020 90.3  81.4 - 97.8 fL Final   • MCH 08/14/2020 29.9  27.0 - 33.0 pg Final   • MCHC 08/14/2020 33.1* 33.7 - 35.3 g/dL Final   • RDW 08/14/2020 42.9  35.9 - 50.0 fL Final   • Platelet Count 08/14/2020 281  164 - 446 K/uL Final   • MPV 08/14/2020 10.7  9.0 - 12.9 fL Final   • Neutrophils-Polys 08/14/2020 49.20  44.00 - 72.00 % Final   • Lymphocytes 08/14/2020 37.60  22.00 - 41.00 % Final   • Monocytes 08/14/2020 7.90  0.00 - 13.40 % Final   • Eosinophils 08/14/2020 3.70  0.00 - 6.90 % Final   • Basophils 08/14/2020 0.90  0.00 - 1.80 % Final   • Immature Granulocytes 08/14/2020 0.70  0.00 - 0.90 % Final   • Nucleated RBC 08/14/2020 0.00  /100 WBC Final   • Neutrophils (Absolute) 08/14/2020 2.25   1.82 - 7.42 K/uL Final    Includes immature neutrophils, if present.   • Lymphs (Absolute) 08/14/2020 1.72  1.00 - 4.80 K/uL Final   • Monos (Absolute) 08/14/2020 0.36  0.00 - 0.85 K/uL Final   • Eos (Absolute) 08/14/2020 0.17  0.00 - 0.51 K/uL Final   • Baso (Absolute) 08/14/2020 0.04  0.00 - 0.12 K/uL Final   • Immature Granulocytes (abs) 08/14/2020 0.03  0.00 - 0.11 K/uL Final   • NRBC (Absolute) 08/14/2020 0.00  K/uL Final   • GFR If  08/14/2020 >60  >60 mL/min/1.73 m 2 Final   • GFR If Non  08/14/2020 >60  >60 mL/min/1.73 m 2 Final   ]    The 10-year ASCVD risk score (Jacques CHAVIS Jr., et al., 2013) is: 3.3%    Values used to calculate the score:      Age: 49 years      Sex: Male      Is Non- : No      Diabetic: No      Tobacco smoker: No      Systolic Blood Pressure: 100 mmHg      Is BP treated: No      HDL Cholesterol: 34 mg/dL      Total Cholesterol: 183 mg/dL    Social History     Tobacco Use   • Smoking status: Never Smoker   • Smokeless tobacco: Current User     Types: Snuff, Chew   • Tobacco comment: 2 cans/week; used since age 6 years    Substance Use Topics   • Alcohol use: Not Currently     Comment: 6-8 beers per day, stopped alcohol 2011   • Drug use: No       Family Status   Relation Name Status   • Mo unknown Alive   • Fa unknown Alive   • Bro unknown Alive     Family History   Family history unknown: Yes         Health Maintenance Summary                IMM INFLUENZA Overdue 9/1/2020     COLON CANCER SCREENING ANNUAL FIT Postponed 9/8/2020 Originally 12/30/2018. Patient Refused    IMM ZOSTER VACCINES Postponed 9/8/2020 Originally 12/30/2018. Patient Refused    IMM DTaP/Tdap/Td Vaccine Postponed 11/7/2020 Originally 12/30/1987. Patient Refused           Review of Systems:   Constitutional: Negative for fever, chills, weight change, fatigue, loss of appetite.  HNT: Positive for congestion and intermittent pressure in maxillary sinuses.  Negative for  "nosebleeds, odynophagia, sore throat or changes in taste.    Eyes: Negative for vision changes.   Ears: Negative for recent changes in hearing, pain or discharge.  Neck: Negative for pain, swelling, lumps or goiter.  Respiratory: Negative for cough, sputum production, shortness of breath and wheezing.    Cardiovascular: Negative for chest pain, palpitations, orthopnea and leg swelling.   Gastrointestinal: Negative for constipation, diarrhea, heartburn, dysphagia, nausea, vomiting or abdominal pain.   Genitourinary: Negative for dysuria, urgency and frequency.   Musculoskeletal: Negative for myalgias, joint pain, and back pain.  Skin: Negative for skin, hair or nail changes, rash, itching.   Neurological: Negative for dizziness, tingling, tremors, sensory change, gait/coordination changes, focal weakness and headaches.   Endo/Heme/Allergies: Does not bruise/bleed easily.   Psychiatric/Behavioral: Negative for depression, suicidal ideas and memory loss.  The patients anxiety is currently controlled.    Exam:  /80 (BP Location: Left arm, Patient Position: Sitting, BP Cuff Size: Large adult)   Pulse (!) 54   Temp 36.7 °C (98 °F) (Temporal)   Ht 1.803 m (5' 11\")   Wt 97.5 kg (215 lb)   SpO2 98%  Body mass index is 29.99 kg/m².  General:  Well nourished, overweight white male. No apparent distress.  Eyes: EOM intact, PERRL, conjunctiva non-injected, sclera non-icteric.  Ears: Pam pinnae, external auditory canals, TM pearly gray with normal light reflex bilaterally.  Neck: Supple with no cervical lymphadenopathy, JVD, palpable thyroid nodules or carotid bruits.  Pulmonary: Clear to ausculation bilaterally. Normal effort. No rales, ronchi, or wheezing.  Cardiovascular: Regular rate and rhythm without murmur, rub or gallop.   Extremities: Full range of motion. Warm and well perfused with no edema.  Skin: Intact with no obvious rashes or lesions.  Neuro: Cranial nerves I-XII intact.  Psych: Alert and oriented x " 3.  Appropriately dressed. Mood and affect appropriate.      Assessment/Plan:  1. EVIE (generalized anxiety disorder)  sertraline (ZOLOFT) 50 MG Tab   2. Wellness examination  CBC WITH DIFFERENTIAL    Comp Metabolic Panel    Lipid Profile    VITAMIN D,25 HYDROXY   3. Vitamin D deficiency  VITAMIN D,25 HYDROXY   4. Chronic nasal congestion         Reviewed risks and benefits of treatment plan. Patient verbally agrees to plan of care.     Return in about 1 year (around 9/8/2021).    Please note that this dictation was created using voice recognition software. I have made every reasonable attempt to correct obvious errors, but I expect that there are errors of grammar and possibly content that I did not discover before finalizing the note.

## 2020-09-08 NOTE — ASSESSMENT & PLAN NOTE
Chronic condition for this patient.  He states he has had been having more anxiety recently and has bumped up his sertraline dose on his own to 150 mg a day.  He states this in addition to his Vistaril 25 mg 3 times daily as needed has been much better in controlling his symptoms.  I have updated his sertraline prescription with the pharmacy to reflect the current dose.  We will continue to follow.

## 2020-09-08 NOTE — PATIENT INSTRUCTIONS
Vitamin D 2000 units daily  Over the counter antihistamine (Zyrtec, Allegra, Claritin or generics)  Labs 1 week before appointment next year.

## 2021-03-19 ENCOUNTER — OCCUPATIONAL MEDICINE (OUTPATIENT)
Dept: URGENT CARE | Facility: PHYSICIAN GROUP | Age: 51
End: 2021-03-19
Payer: COMMERCIAL

## 2021-03-19 ENCOUNTER — APPOINTMENT (OUTPATIENT)
Dept: URGENT CARE | Facility: PHYSICIAN GROUP | Age: 51
End: 2021-03-19

## 2021-03-19 VITALS
HEIGHT: 71 IN | OXYGEN SATURATION: 99 % | RESPIRATION RATE: 12 BRPM | HEART RATE: 65 BPM | DIASTOLIC BLOOD PRESSURE: 82 MMHG | BODY MASS INDEX: 29.68 KG/M2 | WEIGHT: 212 LBS | SYSTOLIC BLOOD PRESSURE: 124 MMHG | TEMPERATURE: 98.4 F

## 2021-03-19 DIAGNOSIS — S39.012A BACK STRAIN, INITIAL ENCOUNTER: ICD-10-CM

## 2021-03-19 DIAGNOSIS — S23.9XXA THORACIC BACK SPRAIN, INITIAL ENCOUNTER: ICD-10-CM

## 2021-03-19 DIAGNOSIS — S33.5XXA SPRAIN OF LOW BACK, INITIAL ENCOUNTER: ICD-10-CM

## 2021-03-19 DIAGNOSIS — S23.3XXA SPRAIN OF UPPER BACK, INITIAL ENCOUNTER: ICD-10-CM

## 2021-03-19 PROCEDURE — 99214 OFFICE O/P EST MOD 30 MIN: CPT | Performed by: FAMILY MEDICINE

## 2021-03-19 RX ORDER — CYCLOBENZAPRINE HCL 10 MG
TABLET ORAL
Qty: 21 TABLET | Refills: 0 | Status: SHIPPED | OUTPATIENT
Start: 2021-03-19 | End: 2021-05-11 | Stop reason: SDUPTHER

## 2021-03-19 RX ORDER — PREDNISONE 20 MG/1
TABLET ORAL
Qty: 9 TABLET | Refills: 0 | Status: SHIPPED | OUTPATIENT
Start: 2021-03-19 | End: 2021-04-03

## 2021-03-19 RX ORDER — IBUPROFEN 600 MG/1
TABLET ORAL
Qty: 28 TABLET | Refills: 0 | Status: SHIPPED | OUTPATIENT
Start: 2021-03-19 | End: 2021-04-12 | Stop reason: SDUPTHER

## 2021-03-19 ASSESSMENT — FIBROSIS 4 INDEX: FIB4 SCORE: 0.64

## 2021-03-19 NOTE — PROGRESS NOTES
Chief Complaint:    Chief Complaint   Patient presents with   • Work-Related Injury     Lower Back injury x1day       History of Present Illness:    DOI: 3/18/21. 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 Ibuprofen helped some, Doans did not. Occasional pain down left arm. Occasional pain down left leg just a little past knee. Hurts essentially entire back. History of back problems, but has not had any significant back pain for 1-2 years.      MRI T-spine (12/11/07) showed: DEGENERATIVE DISC DISEASE MOST PROMINENT AT THE T7-8 LEVEL, WHERE THERE IS CORD FLATTENING ON THE RIGHT.     MRI T-spine (10/14/04) showed: SMALL RIGHT PARACENTRAL T7-T8 DISC PROTRUSION.     CT T-spine (11/12/08) showed: MULTILEVEL DEGENERATIVE DISK DISEASE INVOLVING THE T6-7 [id number] T9-10   LEVEL.      Past Medical History:    Past Medical History:   Diagnosis Date   • Acute non-recurrent maxillary sinusitis 8/11/2020   • Femoroacetabular impingement of left hip 12/2/2016   • Hypertension     history of; MD took pt off medication approx 2011   • Pain     left hip     Past Surgical History:    Past Surgical History:   Procedure Laterality Date   • HIP ARTHROSCOPY Left 12/2/2016    Procedure: HIP ARTHROSCOPY;  Surgeon: Forrest Gil M.D.;  Location: [facility];  Service:    • FEMORAL NECK OSTEOPLASTY Left 12/2/2016    Procedure: FEMORAL NECK OSTEOPLASTY;  Surgeon: Forrest Gil M.D.;  Location: [facility];  Service:    • ACETABULAR OSTEOPLASTY Left 12/2/2016    Procedure: ACETABULAR OSTEOPLASTY, POSS PSOAS RELEASE AND YOUNG;  Surgeon: Forrest Gil M.D.;  Location: [facility];  Service:      Social History:    Social History     Socioeconomic History   • Marital status:      Spouse name: Not on file   • Number of children: Not on file   • Years of education: Not on file   • Highest education level: Not on file   Occupational History   • Not on file   Tobacco Use   • Smoking status:  Never Smoker   • Smokeless tobacco: Current User     Types: Snuff, Chew   • Tobacco comment: 2 cans/week; used since age 6 years    Substance and Sexual Activity   • Alcohol use: Not Currently     Comment: 6-8 beers per day, stopped alcohol 2011   • Drug use: No   • Sexual activity: Yes   Other Topics Concern   • Not on file   Social History Narrative   • Not on file     Social Determinants of Health     Financial Resource Strain:    • Difficulty of Paying Living Expenses:    Food Insecurity:    • Worried About Running Out of Food in the Last Year:    • Ran Out of Food in the Last Year:    Transportation Needs:    • Lack of Transportation (Medical):    • Lack of Transportation (Non-Medical):    Physical Activity:    • Days of Exercise per Week:    • Minutes of Exercise per Session:    Stress:    • Feeling of Stress :    Social Connections:    • Frequency of Communication with Friends and Family:    • Frequency of Social Gatherings with Friends and Family:    • Attends Sabianist Services:    • Active Member of Clubs or Organizations:    • Attends Club or Organization Meetings:    • Marital Status:    Intimate Partner Violence:    • Fear of Current or Ex-Partner:    • Emotionally Abused:    • Physically Abused:    • Sexually Abused:      Family History:    Family History   Family history unknown: Yes     Medications:    Current Outpatient Medications on File Prior to Visit   Medication Sig Dispense Refill   • sertraline (ZOLOFT) 50 MG Tab Take 3 Tabs by mouth every day. 270 Tab 2   • hydrOXYzine pamoate (VISTARIL) 25 MG Cap Take 1 Cap by mouth 3 times a day as needed for Anxiety. 90 Cap 1     No current facility-administered medications on file prior to visit.     Allergies:    Allergies   Allergen Reactions   • Aspirin Anaphylaxis   • Guaifenesin Anaphylaxis   • Penicillin [Penicillin G Potassium] Anaphylaxis       Vitals:    Vitals:    03/19/21 1427   BP: 124/82   Pulse: 65   Resp: 12   Temp: 36.9 °C (98.4 °F)  "  TempSrc: Temporal   SpO2: 99%   Weight: 96.2 kg (212 lb)   Height: 1.803 m (5' 11\")       Physical Exam:    Constitutional: Vital signs reviewed. Appears well-developed and well-nourished. No acute distress.   Eyes: Sclera white, conjunctivae clear.  ENT: External ears normal. Hearing normal.  Pulmonary/Chest: Respirations non-labored.  Musculoskeletal: Moderately decreased lumbar range of motion due to pain. Able to essentially do normal range of motion of neck and shoulders although with some pain in back. Diffuse tenderness to palpation entire back.  Neurological: Alert and oriented to person, place, and time. Muscle tone normal. Coordination normal. Light touch and sensation normal.   Skin: No rashes or lesions. Warm, dry, normal turgor.  Psychiatric: Normal mood and affect. Behavior is normal. Judgment and thought content normal.       Assessment / Plan:    1. Back strain, initial encounter  - predniSONE (DELTASONE) 20 MG Tab; 2 TABS BY MOUTH ONCE A DAY ON DAYS 1-3, 1 TAB ONCE A DAY ON DAYS 4-6. TAKE WITH FOOD.  Dispense: 9 tablet; Refill: 0  - cyclobenzaprine (FLEXERIL) 10 mg Tab; 1 TAB BY MOUTH EVERY 8 HOURS ONLY IF NEEDED FOR PAIN, MUSCLE SPASM, AND/OR MUSCLE TIGHTNESS. MAY CAUSE DROWSINESS.  Dispense: 21 tablet; Refill: 0  - ibuprofen (MOTRIN) 600 MG Tab; 1 TAB BY MOUTH EVERY 6 HOURS ONLY IF NEEDED FOR PAIN AND INFLAMMATION. TAKE WITH FOOD.  Dispense: 28 tablet; Refill: 0    2. Sprain of upper back, initial encounter  - predniSONE (DELTASONE) 20 MG Tab; 2 TABS BY MOUTH ONCE A DAY ON DAYS 1-3, 1 TAB ONCE A DAY ON DAYS 4-6. TAKE WITH FOOD.  Dispense: 9 tablet; Refill: 0  - cyclobenzaprine (FLEXERIL) 10 mg Tab; 1 TAB BY MOUTH EVERY 8 HOURS ONLY IF NEEDED FOR PAIN, MUSCLE SPASM, AND/OR MUSCLE TIGHTNESS. MAY CAUSE DROWSINESS.  Dispense: 21 tablet; Refill: 0  - ibuprofen (MOTRIN) 600 MG Tab; 1 TAB BY MOUTH EVERY 6 HOURS ONLY IF NEEDED FOR PAIN AND INFLAMMATION. TAKE WITH FOOD.  Dispense: 28 tablet; Refill: " 0    3. Thoracic back sprain, initial encounter  - predniSONE (DELTASONE) 20 MG Tab; 2 TABS BY MOUTH ONCE A DAY ON DAYS 1-3, 1 TAB ONCE A DAY ON DAYS 4-6. TAKE WITH FOOD.  Dispense: 9 tablet; Refill: 0  - cyclobenzaprine (FLEXERIL) 10 mg Tab; 1 TAB BY MOUTH EVERY 8 HOURS ONLY IF NEEDED FOR PAIN, MUSCLE SPASM, AND/OR MUSCLE TIGHTNESS. MAY CAUSE DROWSINESS.  Dispense: 21 tablet; Refill: 0  - ibuprofen (MOTRIN) 600 MG Tab; 1 TAB BY MOUTH EVERY 6 HOURS ONLY IF NEEDED FOR PAIN AND INFLAMMATION. TAKE WITH FOOD.  Dispense: 28 tablet; Refill: 0    4. Sprain of low back, initial encounter  - predniSONE (DELTASONE) 20 MG Tab; 2 TABS BY MOUTH ONCE A DAY ON DAYS 1-3, 1 TAB ONCE A DAY ON DAYS 4-6. TAKE WITH FOOD.  Dispense: 9 tablet; Refill: 0  - cyclobenzaprine (FLEXERIL) 10 mg Tab; 1 TAB BY MOUTH EVERY 8 HOURS ONLY IF NEEDED FOR PAIN, MUSCLE SPASM, AND/OR MUSCLE TIGHTNESS. MAY CAUSE DROWSINESS.  Dispense: 21 tablet; Refill: 0  - ibuprofen (MOTRIN) 600 MG Tab; 1 TAB BY MOUTH EVERY 6 HOURS ONLY IF NEEDED FOR PAIN AND INFLAMMATION. TAKE WITH FOOD.  Dispense: 28 tablet; Refill: 0      Discussed with him DDX, management options, and risks, benefits, and alternatives to treatment plan agreed upon.    Work restrictions: OK to still drive. 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 No lifting more than 20 pounds.    Declines Toradol injection. Ibuprofen and Prednisone prescribed for anti-inflammatory effect. Cyclobenzaprine prescribed for muscle relaxer.    Return on 3/26/21 or sooner if needed.

## 2021-03-19 NOTE — LETTER
[facility] [place] [place] [address]  Phone:  [phone] - Fax:  [phone]   [facility] Progress Report and Disability Certification  Date of Service: 3/19/2021   No Show:  No  Date / Time of Next Visit: 3/26/2021   Claim Information   Patient Name: Keenan Morgan  Claim Number:     Employer:    Date of Injury: 3/18/2021     Insurer / TPA:    ID / SSN:     Occupation: /APPLICATOR  Diagnosis: Diagnoses of Back strain, initial encounter, Sprain of upper back, initial encounter, Thoracic back sprain, initial encounter, and Sprain of low back, initial encounter were pertinent to this visit.    Medical Information   Related to Industrial Injury? Yes    Subjective Complaints:  DOI: 3/18/21. 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 Ibuprofen helped some, Doans did not. Occasional pain down left arm. Occasional pain down left leg just a little past knee. Hurts essentially entire back.   Objective Findings: Moderately decreased lumbar range of motion due to pain. Able to essentially do normal range of motion of neck and shoulders although with some pain in back. Diffuse tenderness to palpation entire back.   Pre-Existing Condition(s): History of back problems, but has not had any significant back pain for 1-2 years.   Assessment:   Initial Visit    Status: Additional Care Required  Comments:Return on 3/26/21 or sooner if needed.  Permanent Disability:No    Plan: Medication  Comments:Declines Toradol injection. Ibuprofen and Prednisone prescribed for anti-inflammatory effect. Cyclobenzaprine prescribed for muscle relaxer.    Diagnostics:      Comments:       Disability Information   Status: Released to Restricted Duty    From:  3/19/2021  Through: 3/26/2021 Restrictions are: Temporary   Physical Restrictions   Sitting:    Standing:    Stooping:    Bending:      Squatting:    Walking:    Climbing:    Pushing:      Pulling:     Other:    Reaching Above Shoulder (L):   Reaching Above Shoulder (R):       Reaching Below Shoulder (L):    Reaching Below Shoulder (R):      Not to exceed Weight Limits   Carrying(hrs):   Weight Limit(lb):   Lifting(hrs):   Weight  Limit(lb):     Comments: OK to still drive. 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 No lifting more than 20 pounds.    Repetitive Actions   Hands: i.e. Fine Manipulations from Grasping:     Feet: i.e. Operating Foot Controls:     Driving / Operate Machinery:     Provider Name:   Marciano Ornelas M.D. Physician Signature:  Physician Name:     Clinic Name / Location: [address] [address] Clinic Phone Number: Dept: [phone]   Appointment Time: 2:10 Pm Visit Start Time: 2:26 PM   Check-In Time:  2:24 Pm Visit Discharge Time:  3:18PM   Original-Treating Physician or Chiropractor    Page 2-Insurer/TPA    Page 3-Employer    Page 4-Employee

## 2021-03-19 NOTE — LETTER
"EMPLOYEE’S CLAIM FOR COMPENSATION/ REPORT OF INITIAL TREATMENT  FORM C-4    EMPLOYEE’S CLAIM - PROVIDE ALL INFORMATION REQUESTED   First Name  Keenan Last Name  Eddie Birthdate                    1970                Sex  male Claim Number   Home Address  [address] Age  50 y.o. 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   What were you doing at the time of accident? (if applicable)  MOVING PIECE OF EQUIP    How did this injury or occupational disease occur? (Be specific an answer in detail. Use additional sheet if necessary)  LIEFTED EQUIP TONGUE AND STRAINED BACK   If you believe that you have an occupational disease, when did you first have knowledge of the disability and it relationship to your employment?  N/A Witnesses to the Accident  N/A      Nature of Injury or Occupational Disease  Workers' Compensation  Part(s) of Body Injured or Affected  Upper Back Area (Thoracic Area), Lower Back Area (Lumbar Area & Lumbo-Sacral), N/A    I certify that the above is true and correct to the best of my knowledge and that I have provided this information in order to obtain the benefits of [place]  I hereby authorize any physician, chiropractor, surgeon, practitioner, or other person, any hospital, including [facility] or [facility], any medical service organization, any insurance company, or other institution or organization to release to each other, any medical or other information, including benefits paid or payable, pertinent to this injury or disease, except information relative to diagnosis, treatment and/or counseling for AIDS, psychological conditions, alcohol or controlled substances, for which I must give specific authorization.  A Photostat of this authorization shall be as valid as the original.     Date 3/19/2021   [facility]   Employee’s Signature   THIS REPORT MUST BE COMPLETED AND MAILED WITHIN 3 WORKING DAYS OF TREATMENT   [facility]  Name of Facility  [place]   Date  3/19/2021 Diagnosis  (S39.012A) Back strain, initial encounter  (S23.3XXA) Sprain of upper back, initial encounter  (S23.9XXA) Thoracic back sprain, initial encounter  (S33.5XXA) Sprain of low back, initial encounter Is there evidence the injured employee was under the              influence of alcohol and/or another controlled substance at the time of accident?   Hour  2:26 PM Description of Injury or Disease  Diagnoses of Back strain, initial encounter, Sprain of upper back, initial encounter, Thoracic back sprain, initial encounter, and Sprain of low back, initial encounter were pertinent to this visit. No   Treatment  Declines Toradol injection. Ibuprofen and Prednisone prescribed for anti-inflammatory effect. Cyclobenzaprine prescribed for muscle relaxer.  Have you advised the patient to remain off work five days or     more? No   X-Ray Findings      If Yes   From Date  To Date      From information given by the employee, together with medical evidence, can you directly connect this injury or  "occupational disease as job incurred?  Yes If No Full Duty    No Modified Duty  Yes   Is additional medical care by a physician indicated?  Yes  Comments:Return on 3/26/21 or sooner if needed. If Modified Duty, Specify any Limitations / Restrictions  OK to still drive. 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 No lifting more than 20 pounds.     Do you know of any previous injury or disease contributing to this condition or occupational disease?                            No  Comments:History of back problems, but has not had any significant back pain for 1-2 years.   Date  3/19/2021 Print Doctor’s Name   Marciano Ornelas M.D. I certify the employer’s copy of  this form was mailed on:   Address  [address] Insurer’s Use Only     [place]  [address]    Provider’s Tax ID Number  [id number] Telephone  Dept: [phone]      MARCIANO Ni M.D.  Signature:     Degree          ORIGINAL-TREATING PHYSICIAN OR CHIROPRACTOR    PAGE 2-INSURER/TPA    PAGE 3-EMPLOYER    PAGE 4-EMPLOYEE        Form C-4 (rev.10/07)           BRIEF DESCRIPTION OF RIGHTS AND BENEFITS  ([place]ursuant to [place] 616[place])    Notice of Injury or Occupational Disease (Incident Report Form C-1): If an injury or occupational disease (OD) arises out of and in the course of employment, you must provide written notice to your employer as soon as practicable, but no later than 7 days after the accident or OD. Your employer shall maintain a sufficient supply of the required forms.    Claim for Compensation (Form C-4): If medical treatment is sought, the form C-4 is available at the place of initial treatment. A completed \"Claim for Compensation\" (Form C-4) must be filed within 90 days after an accident or OD. The treating physician or chiropractor must, within 3 working days after treatment, complete and mail to the employer, the employer's insurer and third-party , the Claim for Compensation.    Medical Treatment: If " you require medical treatment for your on-the-job injury or OD, you may be required to select a physician or chiropractor from a list provided by your workers’ compensation insurer, if it has contracted with an Organization for Managed Care (MCO) or Preferred Provider Organization (PPO) or providers of health care. If your employer has not entered into a contract with an MCO or PPO, you may select a physician or chiropractor from the Panel of Physicians and Chiropractors. Any medical costs related to your industrial injury or OD will be paid by your insurer.    Temporary Total Disability (TTD): If your doctor has certified that you are unable to work for a period of at least 5 consecutive days, or 5 cumulative days in a 20-day period, or places restrictions on you that your employer does not accommodate, you may be entitled to TTD compensation.    Temporary Partial Disability (TPD): If the wage you receive upon reemployment is less than the compensation for TTD to which you are entitled, the insurer may be required to pay you TPD compensation to make up the difference. TPD can only be paid for a maximum of 24 months.    Permanent Partial Disability (PPD): When your medical condition is stable and there is an indication of a PPD as a result of your injury or OD, within 30 days, your insurer must arrange for an evaluation by a rating physician or chiropractor to determine the degree of your PPD. The amount of your PPD award depends on the date of injury, the results of the PPD evaluation, your age and wage.    Permanent Total Disability (PTD): If you are medically certified by a treating physician or chiropractor as permanently and totally disabled and have been granted a PTD status by your insurer, you are entitled to receive monthly benefits not to exceed 66 2/3% of your average monthly wage. The amount of your PTD payments is subject to reduction if you previously received a lump-sum PPD award.    Vocational  Rehabilitation Services: You may be eligible for vocational rehabilitation services if you are unable to return to the job due to a permanent physical impairment or permanent restrictions as a result of your injury or occupational disease.    Transportation and Per Jamie Reimbursement: You may be eligible for travel expenses and per jamie associated with medical treatment.    Reopening: You may be able to reopen your claim if your condition worsens after claim closure.     Appeal Process: If you disagree with a written determination issued by the insurer or the insurer does not respond to your request, you may appeal to the Department of Administration, , by following the instructions contained in your determination letter. You must appeal the determination within 70 days from the date of the determination letter at [address], [address], [place], [place] [address], or [address], [address], [place], [place] [address]. If you disagree with the  decision, you may appeal to the Department of Administration, . You must file your appeal within 30 days from the date of the  decision letter at [address], [address], [place], [place] [address], or [address], [address], [place], [place] [address]. If you disagree with a decision of an , you may file a petition for judicial review with the District Court. You must do so within 30 days of the Appeal Officer’s decision. You may be represented by an  at your own expense or you may contact the [facility] for possible representation.    [place]  for Injured Workers (NAIW): If you disagree with a  decision, you may request that NAIW represent you without charge at an  Hearing. For information regarding denial of benefits, you may contact the [facility] at: [address], [address], [place] [address], [phone], or [address], [address], [place] [address], [phone]    To File a Complaint with the Division: If you wish to file a complaint with the  of the Division of Industrial Relations (DIR),  please contact the Workers’ Compensation Section, [address], [address], [place], [place] [address], telephone [phone], or [address], [address], [place], [place] [address], telephone [phone].    For assistance with Workers’ Compensation Issues: You may contact the [facility] Office for Consumer Health Assistance, [address], [address], [place], [place] [address], Toll Free 1-532.268.9226, Web site: http://[facility].[place].gov/Programs/FELIPA E-mail: felipa@[facility].nv.[facility]              __________________________________________________________________                                    _________________            Employee Name / Signature                                                                                                                            Date                                                                                                                                                                                                                              D-2 (rev. 10/20)

## 2021-03-27 ENCOUNTER — OCCUPATIONAL MEDICINE (OUTPATIENT)
Dept: URGENT CARE | Facility: PHYSICIAN GROUP | Age: 51
End: 2021-03-27
Payer: COMMERCIAL

## 2021-03-27 VITALS
SYSTOLIC BLOOD PRESSURE: 130 MMHG | TEMPERATURE: 98.4 F | DIASTOLIC BLOOD PRESSURE: 82 MMHG | OXYGEN SATURATION: 100 % | WEIGHT: 215 LBS | RESPIRATION RATE: 12 BRPM | BODY MASS INDEX: 30.1 KG/M2 | HEART RATE: 64 BPM | HEIGHT: 71 IN

## 2021-03-27 DIAGNOSIS — S33.5XXD SPRAIN OF LOW BACK, SUBSEQUENT ENCOUNTER: ICD-10-CM

## 2021-03-27 DIAGNOSIS — S39.012D BACK STRAIN, SUBSEQUENT ENCOUNTER: ICD-10-CM

## 2021-03-27 DIAGNOSIS — S23.9XXD THORACIC BACK SPRAIN, SUBSEQUENT ENCOUNTER: ICD-10-CM

## 2021-03-27 DIAGNOSIS — S23.3XXD SPRAIN OF UPPER BACK, SUBSEQUENT ENCOUNTER: ICD-10-CM

## 2021-03-27 PROCEDURE — 99213 OFFICE O/P EST LOW 20 MIN: CPT | Performed by: NURSE PRACTITIONER

## 2021-03-27 ASSESSMENT — ENCOUNTER SYMPTOMS
DIZZINESS: 0
FOCAL WEAKNESS: 0
CHILLS: 0
HEADACHES: 0
SENSORY CHANGE: 0
FEVER: 0
BACK PAIN: 1
NECK PAIN: 0
WEAKNESS: 0

## 2021-03-27 ASSESSMENT — FIBROSIS 4 INDEX: FIB4 SCORE: 0.64

## 2021-03-27 NOTE — PROGRESS NOTES
Subjective:      Keenan Morgan is a 50 y.o. male who presents with Work-Related Injury (Follow-Up, Back strain, 03/18, not feeling better )      DOI: 3/18/21. 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 Ibuprofen helped some, Doans did not. Occasional pain down left arm. Occasional pain down left leg just a little past knee. Hurts essentially entire back. History of back problems, but has not had any significant back pain for 1-2 years.        MRI T-spine (12/11/07) showed: DEGENERATIVE DISC DISEASE MOST PROMINENT AT THE T7-8 LEVEL, WHERE THERE IS CORD FLATTENING ON THE RIGHT.      MRI T-spine (10/14/04) showed: SMALL RIGHT PARACENTRAL T7-T8 DISC PROTRUSION.      CT T-spine (11/12/08) showed: MULTILEVEL DEGENERATIVE DISK DISEASE INVOLVING THE T6-7 [id number] T9-10   LEVEL.    FUV #2: 03/27/2021: Patient states he is about 10% better.  Has not been able to fill his medications and has not taken anything else besides Tylenol because Worker's Comp. is denying the claim because a previous claim was not paid by the work comp insurance from a previous work comp injury.  Patient does state that they are working on this.  States that his restrictions are okay at this time and he is unable to lessen them.  Denies any numbness or tingling.  Does state that his upper back pain mid back pain comes and goes but his low back pain is consistent but denies radiation, paresthesias.     HPI   See above    Review of Systems   Constitutional: Negative for chills, fever and malaise/fatigue.   Musculoskeletal: Positive for back pain. Negative for joint pain and neck pain.   Neurological: Negative for dizziness, sensory change, focal weakness, weakness and headaches.   All other systems reviewed and are negative.        PMH: No pertinent past medical history to this problem  MEDS: Medications were reviewed in Epic  ALLERGIES: Allergies were reviewed in Epic  SOCHX: Works as a   FH: No pertinent family history to  "this problem            Objective:     /82   Pulse 64   Temp 36.9 °C (98.4 °F) (Temporal)   Resp 12   Ht 1.803 m (5' 11\")   Wt 97.5 kg (215 lb)   SpO2 100%   BMI 29.99 kg/m²      Physical Exam  Vitals reviewed.   Constitutional:       Appearance: Normal appearance.   Cardiovascular:      Rate and Rhythm: Normal rate and regular rhythm.      Heart sounds: Normal heart sounds.   Pulmonary:      Effort: Pulmonary effort is normal.      Breath sounds: Normal breath sounds.   Musculoskeletal:      Cervical back: No swelling, edema, deformity, erythema, signs of trauma, lacerations, rigidity, spasms, torticollis, tenderness, bony tenderness or crepitus. Pain with movement present. Normal range of motion.      Thoracic back: Tenderness present. No swelling, edema, deformity, signs of trauma, lacerations, spasms or bony tenderness. Decreased range of motion. No scoliosis.      Lumbar back: Spasms present. No swelling, edema, deformity, signs of trauma, lacerations, tenderness or bony tenderness. Decreased range of motion. Positive right straight leg raise test and positive left straight leg raise test. No scoliosis.      Comments: Spinal flexion to 35 degrees without pain, pain with palpation to lower back and mid thoracic spine along the paraspinous muscle.    Pain with spinal rotation bilaterally    No pain with dorsiflexion or plantarflexion.   Skin:     Capillary Refill: Capillary refill takes less than 2 seconds.   Neurological:      Mental Status: He is alert and oriented to person, place, and time.      GCS: GCS eye subscore is 4. GCS verbal subscore is 5. GCS motor subscore is 6.      Cranial Nerves: Cranial nerves are intact.      Sensory: Sensation is intact.      Motor: Motor function is intact.      Coordination: Coordination is intact.      Gait: Gait is intact.   Psychiatric:         Mood and Affect: Mood normal.         Behavior: Behavior normal.         Thought Content: Thought content normal.   "       Judgment: Judgment normal.                 Assessment/Plan:        1. Back strain, subsequent encounter    2. Sprain of upper back, subsequent encounter    3. Thoracic back sprain, subsequent encounter    4. Sprain of low back, subsequent encounter      FUV 7 days, sooner if needed  See D 39

## 2021-03-27 NOTE — LETTER
[facility]  [address] [place] [address] [place] Jean [address]  Phone:  [phone] - Fax:  [phone]   [facility] Progress Report and Disability Certification  Date of Service: 3/27/2021   No Show:  No  Date / Time of Next Visit: 4/3/2021   Claim Information   Patient Name: Keenan Morgan  Claim Number:     Employer:    Date of Injury: 3/18/2021     Insurer / TPA:    ID / SSN:     Occupation: /APPLICATOR  Diagnosis: Diagnoses of Back strain, subsequent encounter, Sprain of upper back, subsequent encounter, Thoracic back sprain, subsequent encounter, and Sprain of low back, subsequent encounter were pertinent to this visit.    Medical Information   Related to Industrial Injury? Yes    Subjective Complaints:  DOI: 3/18/21. 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 Ibuprofen helped some, Doans did not. Occasional pain down left arm. Occasional pain down left leg just a little past knee. Hurts essentially entire back. History of back problems, but has not had any significant back pain for 1-2 years.        MRI T-spine (12/11/07) showed: DEGENERATIVE DISC DISEASE MOST PROMINENT AT THE T7-8 LEVEL, WHERE THERE IS CORD FLATTENING ON THE RIGHT.      MRI T-spine (10/14/04) showed: SMALL RIGHT PARACENTRAL T7-T8 DISC PROTRUSION.      CT T-spine (11/12/08) showed: MULTILEVEL DEGENERATIVE DISK DISEASE INVOLVING THE T6-7 [id number] T9-10   LEVEL.    FUV #2: 03/27/2021: Patient states he is about 10% better.  Has not been able to fill his medications and has not taken anything else besides Tylenol because Worker's Comp. is denying the claim because a previous claim was not paid by the work comp insurance from a previous work comp injury.  Patient does state that they are working on this.  States that his restrictions are okay at this time and he is unable to lessen them.  Denies any numbness or tingling.  Does state that his upper back pain mid back pain comes and  goes but his low back pain is consistent but denies radiation, paresthesias.    Review of Systems   Constitutional: Negative for chills, fever and malaise/fatigue.   Musculoskeletal: Positive for back pain. Negative for joint pain and neck pain.   Neurological: Negative for dizziness, sensory change, focal weakness, weakness and headaches.   All other systems reviewed and are negative.     Objective Findings: Physical Exam  Vitals reviewed.   Constitutional:       Appearance: Normal appearance.   Cardiovascular:      Rate and Rhythm: Normal rate and regular rhythm.      Heart sounds: Normal heart sounds.   Pulmonary:      Effort: Pulmonary effort is normal.      Breath sounds: Normal breath sounds.   Musculoskeletal:      Cervical back: No swelling, edema, deformity, erythema, signs of trauma, lacerations, rigidity, spasms, torticollis, tenderness, bony tenderness or crepitus. Pain with movement present. Normal range of motion.      Thoracic back: Tenderness present. No swelling, edema, deformity, signs of trauma, lacerations, spasms or bony tenderness. Decreased range of motion. No scoliosis.      Lumbar back: Spasms present. No swelling, edema, deformity, signs of trauma, lacerations, tenderness or bony tenderness. Decreased range of motion. Positive right straight leg raise test and positive left straight leg raise test. No scoliosis.      Comments: Spinal flexion to 35 degrees without pain, pain with palpation to lower back and mid thoracic spine along the paraspinous muscle.    Pain with spinal rotation bilaterally    No pain with dorsiflexion or plantarflexion.   Skin:     Capillary Refill: Capillary refill takes less than 2 seconds.   Neurological:      Mental Status: He is alert and oriented to person, place, and time.      GCS: GCS eye subscore is 4. GCS verbal subscore is 5. GCS motor subscore is 6.      Cranial Nerves: Cranial nerves are intact.      Sensory: Sensation is intact.      Motor: Motor  function is intact.      Coordination: Coordination is intact.      Gait: Gait is intact.   Psychiatric:         Mood and Affect: Mood normal.         Behavior: Behavior normal.         Thought Content: Thought content normal.         Judgment: Judgment normal.        Pre-Existing Condition(s):     Assessment:   Condition Improved    Status: Additional Care Required  Permanent Disability:No    Plan:   Comments:Patient needs to fill medications: Ibuprofen, steroids, Flexeril.    Diagnostics:      Comments:       Disability Information   Status: Released to Restricted Duty    From:  3/27/2021  Through: 4/3/2021 Restrictions are: Temporary   Physical Restrictions   Sitting:    Standing:    Stooping:    Bending:      Squatting:    Walking:    Climbing:    Pushing:      Pulling:    Other:    Reaching Above Shoulder (L):   Reaching Above Shoulder (R):       Reaching Below Shoulder (L):    Reaching Below Shoulder (R):      Not to exceed Weight Limits   Carrying(hrs):   Weight Limit(lb):   Comments:20 pounds Lifting(hrs):   Weight  Limit(lb):   Comments:20 pounds    Comments: Work restrictions: OK to still drive. 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 No lifting more than 20 pounds.  PATIENT NEEDS TO GET MEDICATIONS FILLED    Repetitive Actions   Hands: i.e. Fine Manipulations from Grasping:     Feet: i.e. Operating Foot Controls:     Driving / Operate Machinery:     Provider Name:   DIANNA Mauricio Physician Signature:  Physician Name:     Clinic Name / Location: [address] [address] Clinic Phone Number: Dept: [phone]   Appointment Time: 11:00 Am Visit Start Time: 10:51 AM   Check-In Time:  10:20 Am Visit Discharge Time:  11:26am   Original-Treating Physician or Chiropractor    Page 2-Insurer/TPA    Page 3-Employer    Page 4-Employee     Sixto Kennedy(Attending)

## 2021-04-03 ENCOUNTER — OCCUPATIONAL MEDICINE (OUTPATIENT)
Dept: URGENT CARE | Facility: PHYSICIAN GROUP | Age: 51
End: 2021-04-03
Payer: COMMERCIAL

## 2021-04-03 VITALS
WEIGHT: 213 LBS | RESPIRATION RATE: 16 BRPM | BODY MASS INDEX: 29.71 KG/M2 | TEMPERATURE: 97.7 F | DIASTOLIC BLOOD PRESSURE: 88 MMHG | SYSTOLIC BLOOD PRESSURE: 124 MMHG | OXYGEN SATURATION: 96 % | HEART RATE: 68 BPM

## 2021-04-03 DIAGNOSIS — S23.3XXD SPRAIN OF UPPER BACK, SUBSEQUENT ENCOUNTER: ICD-10-CM

## 2021-04-03 DIAGNOSIS — S23.9XXD THORACIC BACK SPRAIN, SUBSEQUENT ENCOUNTER: ICD-10-CM

## 2021-04-03 DIAGNOSIS — S33.5XXD SPRAIN OF LOW BACK, SUBSEQUENT ENCOUNTER: ICD-10-CM

## 2021-04-03 DIAGNOSIS — S39.012D BACK STRAIN, SUBSEQUENT ENCOUNTER: ICD-10-CM

## 2021-04-03 PROCEDURE — 99213 OFFICE O/P EST LOW 20 MIN: CPT | Performed by: FAMILY MEDICINE

## 2021-04-03 ASSESSMENT — FIBROSIS 4 INDEX: FIB4 SCORE: 0.64

## 2021-04-03 ASSESSMENT — PAIN SCALES - GENERAL: PAINLEVEL: 7=MODERATE-SEVERE PAIN

## 2021-04-03 NOTE — PROGRESS NOTES
Chief Complaint:    Chief Complaint   Patient presents with   • Follow-Up     WC fv-feeling better from last visit but still having pain 7/10        History of Present Illness:    DOI: 3/18/21. Compared to last visit 3/27/21, he is better, nothing worse. Hurts mostly in lower back. Finished Prednisone course prescribed on 3/19/21. Using Ibuprofen 600 mg and Cyclobenzaprine 10 mg as needed. Both help some.      Past Medical History:    Past Medical History:   Diagnosis Date   • Acute non-recurrent maxillary sinusitis 8/11/2020   • Femoroacetabular impingement of left hip 12/2/2016   • Hypertension     history of; MD took pt off medication approx 2011   • Pain     left hip     Past Surgical History:    Past Surgical History:   Procedure Laterality Date   • HIP ARTHROSCOPY Left 12/2/2016    Procedure: HIP ARTHROSCOPY;  Surgeon: Forrest Gil M.D.;  Location: [facility];  Service:    • FEMORAL NECK OSTEOPLASTY Left 12/2/2016    Procedure: FEMORAL NECK OSTEOPLASTY;  Surgeon: Forrest Gil M.D.;  Location: [facility];  Service:    • ACETABULAR OSTEOPLASTY Left 12/2/2016    Procedure: ACETABULAR OSTEOPLASTY, POSS PSOAS RELEASE AND YOUNG;  Surgeon: Forrest Gil M.D.;  Location: [facility];  Service:      Social History:    Social History     Socioeconomic History   • Marital status:      Spouse name: Not on file   • Number of children: Not on file   • Years of education: Not on file   • Highest education level: Not on file   Occupational History   • Not on file   Tobacco Use   • Smoking status: Never Smoker   • Smokeless tobacco: Current User     Types: Snuff, Chew   • Tobacco comment: 2 cans/week; used since age 6 years    Substance and Sexual Activity   • Alcohol use: Not Currently     Comment: 6-8 beers per day, stopped alcohol 2011   • Drug use: No   • Sexual activity: Yes   Other Topics Concern   • Not on file   Social History Narrative   • Not on file      Social Determinants of Health     Financial Resource Strain:    • Difficulty of Paying Living Expenses:    Food Insecurity:    • Worried About Running Out of Food in the Last Year:    • Ran Out of Food in the Last Year:    Transportation Needs:    • Lack of Transportation (Medical):    • Lack of Transportation (Non-Medical):    Physical Activity:    • Days of Exercise per Week:    • Minutes of Exercise per Session:    Stress:    • Feeling of Stress :    Social Connections:    • Frequency of Communication with Friends and Family:    • Frequency of Social Gatherings with Friends and Family:    • Attends Jew Services:    • Active Member of Clubs or Organizations:    • Attends Club or Organization Meetings:    • Marital Status:    Intimate Partner Violence:    • Fear of Current or Ex-Partner:    • Emotionally Abused:    • Physically Abused:    • Sexually Abused:      Family History:    Family History   Family history unknown: Yes     Medications:    Current Outpatient Medications on File Prior to Visit   Medication Sig Dispense Refill   • cyclobenzaprine (FLEXERIL) 10 mg Tab 1 TAB BY MOUTH EVERY 8 HOURS ONLY IF NEEDED FOR PAIN, MUSCLE SPASM, AND/OR MUSCLE TIGHTNESS. MAY CAUSE DROWSINESS. (Patient not taking: Reported on 3/27/2021) 21 tablet 0   • ibuprofen (MOTRIN) 600 MG Tab 1 TAB BY MOUTH EVERY 6 HOURS ONLY IF NEEDED FOR PAIN AND INFLAMMATION. TAKE WITH FOOD. (Patient not taking: Reported on 3/27/2021) 28 tablet 0   • sertraline (ZOLOFT) 50 MG Tab Take 3 Tabs by mouth every day. 270 Tab 2   • hydrOXYzine pamoate (VISTARIL) 25 MG Cap Take 1 Cap by mouth 3 times a day as needed for Anxiety. (Patient not taking: Reported on 3/27/2021) 90 Cap 1     No current facility-administered medications on file prior to visit.     Allergies:    Allergies   Allergen Reactions   • Aspirin Anaphylaxis   • Guaifenesin Anaphylaxis   • Penicillin [Penicillin G Potassium] Anaphylaxis       Vitals:    Vitals:    04/03/21 1001    BP: 124/88   Pulse: 68   Resp: 16   Temp: 36.5 °C (97.7 °F)   SpO2: 96%   Weight: 96.6 kg (213 lb)       Physical Exam:    Constitutional: Vital signs reviewed. Appears well-developed and well-nourished. No acute distress.   Eyes: Sclera white, conjunctivae clear.  ENT: External ears normal. Hearing normal.  Pulmonary/Chest: Respirations non-labored.  Musculoskeletal: Mildly-moderately decreased lumbar range of motion due to pain. Tender to palpation across lower back.  Neurological: Alert and oriented to person, place, and time. Muscle tone normal. Coordination normal. Light touch and sensation normal.  Skin: No rashes or lesions. Warm, dry, normal turgor.  Psychiatric: Normal mood and affect. Behavior is normal. Judgment and thought content normal.       Assessment / Plan:    1. Back strain, subsequent encounter    2. Sprain of upper back, subsequent encounter    3. Thoracic back sprain, subsequent encounter    4. Sprain of low back, subsequent encounter      Discussed with him DDX, management options, and risks, benefits, and alternatives to treatment plan agreed upon.    Work restrictions: OK to still drive. 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 No lifting more than 20 pounds.      Continue using Ibuprofen 600 mg and Cyclobenzaprine 10 mg as needed.      Return on 4/10/21 or sooner if needed.

## 2021-04-03 NOTE — LETTER
[facility] [place] [address]  Phone:  [phone] - Fax:  [phone]   [facility] Progress Report and Disability Certification  Date of Service: 4/3/2021   No Show:  No  Date / Time of Next Visit: 4/10/2021   Claim Information   Patient Name: Keenan Morgan  Claim Number:     Employer:    Date of Injury: 3/18/2021     Insurer / TPA: [employer]  ID / SSN:     Occupation: /APPLICATOR  Diagnosis: Diagnoses of Back strain, subsequent encounter, Sprain of upper back, subsequent encounter, Thoracic back sprain, subsequent encounter, and Sprain of low back, subsequent encounter were pertinent to this visit.    Medical Information   Related to Industrial Injury? Yes    Subjective Complaints:  DOI: 3/18/21. Compared to last visit 3/27/21, he is better, nothing worse. Hurts mostly in lower back. Finished Prednisone course prescribed on 3/19/21. Using Ibuprofen 600 mg and Cyclobenzaprine 10 mg as needed. Both help some.   Objective Findings: Mildly-moderately decreased lumbar range of motion due to pain. Tender to palpation across lower back.   Pre-Existing Condition(s):     Assessment:   Condition Improved    Status: Additional Care Required  Comments:Return on 4/10/21 or sooner if needed.  Permanent Disability:No    Plan: Medication  Comments:Continue using Ibuprofen 600 mg and Cyclobenzaprine 10 mg as needed.     Diagnostics:      Comments:       Disability Information   Status: Released to Restricted Duty    From:  4/3/2021  Through: 4/10/2021 Restrictions are: Temporary   Physical Restrictions   Sitting:    Standing:    Stooping:    Bending:      Squatting:    Walking:    Climbing:    Pushing:      Pulling:    Other:    Reaching Above Shoulder (L):   Reaching Above Shoulder (R):       Reaching Below Shoulder (L):    Reaching Below Shoulder (R):      Not to exceed Weight Limits   Carrying(hrs):   Weight Limit(lb):   Lifting(hrs):    Weight  Limit(lb):     Comments: OK to still drive. 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 No lifting more than 20 pounds.     Repetitive Actions   Hands: i.e. Fine Manipulations from Grasping:     Feet: i.e. Operating Foot Controls:     Driving / Operate Machinery:     Provider Name:   Marciano Ornelas M.D. Physician Signature:  Physician Name:     Clinic Name / Location: [address] [address] Clinic Phone Number: Dept: [phone]   Appointment Time: 10:30 Am Visit Start Time: 10:00 AM   Check-In Time:  9:58 Am Visit Discharge Time:  10: 18 AM    Original-Treating Physician or Chiropractor    Page 2-Insurer/TPA    Page 3-Employer    Page 4-Employee

## 2021-04-12 ENCOUNTER — OCCUPATIONAL MEDICINE (OUTPATIENT)
Dept: URGENT CARE | Facility: PHYSICIAN GROUP | Age: 51
End: 2021-04-12
Payer: COMMERCIAL

## 2021-04-12 VITALS
DIASTOLIC BLOOD PRESSURE: 82 MMHG | OXYGEN SATURATION: 97 % | HEART RATE: 59 BPM | SYSTOLIC BLOOD PRESSURE: 130 MMHG | RESPIRATION RATE: 16 BRPM | HEIGHT: 71 IN | WEIGHT: 213.2 LBS | BODY MASS INDEX: 29.85 KG/M2

## 2021-04-12 DIAGNOSIS — S23.3XXD SPRAIN OF UPPER BACK, SUBSEQUENT ENCOUNTER: ICD-10-CM

## 2021-04-12 DIAGNOSIS — S39.012D BACK STRAIN, SUBSEQUENT ENCOUNTER: ICD-10-CM

## 2021-04-12 DIAGNOSIS — S23.9XXD THORACIC BACK SPRAIN, SUBSEQUENT ENCOUNTER: ICD-10-CM

## 2021-04-12 DIAGNOSIS — S33.5XXD SPRAIN OF LOW BACK, SUBSEQUENT ENCOUNTER: ICD-10-CM

## 2021-04-12 PROCEDURE — 99214 OFFICE O/P EST MOD 30 MIN: CPT | Performed by: PHYSICIAN ASSISTANT

## 2021-04-12 RX ORDER — IBUPROFEN 600 MG/1
TABLET ORAL
Qty: 28 TABLET | Refills: 0 | Status: SHIPPED | OUTPATIENT
Start: 2021-04-12 | End: 2021-09-22

## 2021-04-12 ASSESSMENT — FIBROSIS 4 INDEX: FIB4 SCORE: 0.64

## 2021-04-12 NOTE — LETTER
[facility] [place] [place] [address]  Phone:  [phone] - Fax:  [phone]   [facility] Progress Report and Disability Certification  Date of Service: 4/12/2021   No Show:  No  Date / Time of Next Visit: 4/26/2021   Claim Information   Patient Name: Keenan Morgan  Claim Number:     Employer:   [employer] Date of Injury: 3/18/2021     Insurer / TPA: [employer]  ID / SSN:     Occupation: /APPLICATOR  Diagnosis: Diagnoses of Sprain of low back, subsequent encounter, Back strain, subsequent encounter, Sprain of upper back, subsequent encounter, and Thoracic back sprain, subsequent encounter were pertinent to this visit.    Medical Information   Related to Industrial Injury? Yes    Subjective Complaints:  DOI: 3/18/21, 4th visit  LEMUEL: Felt immediate pain in the low back lifting conveyor. Ibuprofen helped the most; APAP helped a small amount. Denies radiating pain, saddle anesthesia, bowel/bladder incontinence, extremity weakness.  History of back problems, but has not had any significant back pain for 1-2 years.  Overall feels he is about 60% improved but continues to have pain with any range of motion.   Objective Findings: Back: Slight decreased range of motion in all planes due to pain.  Slight antalgic gait noted.  No motor or sensory deficit noted.   Pre-Existing Condition(s):     Assessment:   Condition Improved    Status: Additional Care Required  Permanent Disability:No    Plan: MedicationPT    Diagnostics:      Comments:       Disability Information   Status: Released to Restricted Duty    From:  4/12/2021  Through: 4/26/2021 Restrictions are: Temporary   Physical Restrictions   Sitting:    Standing:    Stooping:    Bending:      Squatting:    Walking:    Climbing:    Pushing:      Pulling:    Other:    Reaching Above Shoulder (L):   Reaching Above Shoulder (R):       Reaching Below Shoulder (L):    Reaching Below Shoulder  (R):      Not to exceed Weight Limits   Carrying(hrs):   Weight Limit(lb):   Lifting(hrs):   Weight  Limit(lb):     Comments: OK to still drive. 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 No lifting more than 20 pounds.      Repetitive Actions   Hands: i.e. Fine Manipulations from Grasping:     Feet: i.e. Operating Foot Controls:     Driving / Operate Machinery:     Provider Name:   Rachael Birmingham P.A.-C. Physician Signature:  Physician Name:     Clinic Name / Location: [address] [address] Clinic Phone Number: Dept: [phone]   Appointment Time: 4:00 Pm Visit Start Time: 4:49 PM   Check-In Time:  3:15 Pm Visit Discharge Time:  5:10PM   Original-Treating Physician or Chiropractor    Page 2-Insurer/TPA    Page 3-Employer    Page 4-Employee

## 2021-04-13 NOTE — PROGRESS NOTES
"Chief Complaint   Patient presents with   • Low Back Pain     follow up, xmarch 18th, 55 percent better than when last week       HISTORY OF PRESENT ILLNESS: Patient is a 50 y.o. male who presents today for the following:    DOI: 3/18/21, 4th visit  LEMUEL: Felt immediate pain in the low back lifting conveyor. Ibuprofen helped the most; APAP helped a small amount. Denies radiating pain, saddle anesthesia, bowel/bladder incontinence, extremity weakness.  History of back problems, but has not had any significant back pain for 1-2 years.  Overall feels he is about 60% improved but continues to have pain with any range of motion.       Allergies:Aspirin, Guaifenesin, and Penicillin [penicillin g potassium]    PMH: No pertinent past medical history to this problem  MEDS: Medications were reviewed in Epic  ALLERGIES: Allergies were reviewed in Epic  SOCHX: Works for [email or web address]  FH: No pertinent family history to this problem    Review of Systems:    Constitutional ROS: No unexpected change in weight, No weakness, No fatigue  Musculoskeletal/Extremities ROS: Positive for back pain    Exam:  /82   Pulse (!) 59   Resp 16   Ht 1.803 m (5' 11\")   Wt 96.7 kg (213 lb 3.2 oz)   SpO2 97%   General: Well developed, well nourished. No distress.  Pulmonary: Unlabored respiratory effort.   Neurologic: Grossly nonfocal. No facial asymmetry noted.  Back: Slight decreased range of motion in all planes due to pain.  Slight antalgic gait noted.  No motor or sensory deficit noted.  Skin: Warm, dry, good turgor. No rashes in visible areas.   Psych: Normal mood. Alert and oriented x3. Judgment and insight is normal.    Assessment/Plan:  Almost 3 weeks from injury, patient is approximately 60% improved.  Referring to physical therapy.  Continue current restrictions. Return to clinic in 2 weeks for reevaluation, sooner for any significant changes in symptoms.  1. Sprain of low back, subsequent encounter  REFERRAL TO PHYSICAL THERAPY    " ibuprofen (MOTRIN) 600 MG Tab    Feels he is about 60% improved.  Referring to physical therapy.   2. Back strain, subsequent encounter  REFERRAL TO PHYSICAL THERAPY    ibuprofen (MOTRIN) 600 MG Tab    Mostly resolved, low back pain remains.   3. Sprain of upper back, subsequent encounter  REFERRAL TO PHYSICAL THERAPY    ibuprofen (MOTRIN) 600 MG Tab    Resolved.   4. Thoracic back sprain, subsequent encounter  REFERRAL TO PHYSICAL THERAPY    ibuprofen (MOTRIN) 600 MG Tab    Resolved.

## 2021-04-24 ENCOUNTER — OCCUPATIONAL MEDICINE (OUTPATIENT)
Dept: URGENT CARE | Facility: PHYSICIAN GROUP | Age: 51
End: 2021-04-24
Payer: COMMERCIAL

## 2021-04-24 VITALS
TEMPERATURE: 98.1 F | SYSTOLIC BLOOD PRESSURE: 150 MMHG | HEART RATE: 66 BPM | BODY MASS INDEX: 29.76 KG/M2 | DIASTOLIC BLOOD PRESSURE: 100 MMHG | HEIGHT: 71 IN | WEIGHT: 212.6 LBS

## 2021-04-24 DIAGNOSIS — S39.012D BACK STRAIN, SUBSEQUENT ENCOUNTER: ICD-10-CM

## 2021-04-24 DIAGNOSIS — M54.42 ACUTE LEFT-SIDED LOW BACK PAIN WITH LEFT-SIDED SCIATICA: ICD-10-CM

## 2021-04-24 PROBLEM — E66.3 OVERWEIGHT WITH BODY MASS INDEX (BMI) 25.0-29.9: Status: ACTIVE | Noted: 2019-02-01

## 2021-04-24 PROCEDURE — 99213 OFFICE O/P EST LOW 20 MIN: CPT | Performed by: NURSE PRACTITIONER

## 2021-04-24 RX ORDER — COVID-19 MOLECULAR TEST ASSAY
KIT MISCELLANEOUS
COMMUNITY
Start: 2021-03-13 | End: 2021-09-22

## 2021-04-24 RX ORDER — PREDNISONE 20 MG/1
TABLET ORAL
Qty: 15 TABLET | Refills: 0 | Status: SHIPPED | OUTPATIENT
Start: 2021-04-24 | End: 2021-09-22

## 2021-04-24 RX ORDER — LIDOCAINE 50 MG/G
1 PATCH TOPICAL
Qty: 7 PATCH | Refills: 0 | Status: SHIPPED | OUTPATIENT
Start: 2021-04-24 | End: 2021-09-22

## 2021-04-24 ASSESSMENT — ENCOUNTER SYMPTOMS
TINGLING: 1
GASTROINTESTINAL NEGATIVE: 1
CONSTITUTIONAL NEGATIVE: 1
WEAKNESS: 0
BACK PAIN: 1

## 2021-04-24 ASSESSMENT — FIBROSIS 4 INDEX: FIB4 SCORE: 0.64

## 2021-04-24 ASSESSMENT — VISUAL ACUITY: OU: 1

## 2021-04-24 NOTE — PROGRESS NOTES
"Subjective:     Keenan Morgan is a 50 y.o. male who presents for Low Back Pain (its now going down his left leg)    Initial UC visit 3/19/2021 reviewed for continuity of care:    \"DOI: 3/18/21. 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 Ibuprofen helped some, Doans did not. Occasional pain down left arm. Occasional pain down left leg just a little past knee. Hurts essentially entire back. History of back problems, but has not had any significant back pain for 1-2 years.    MRI T-spine (12/11/07) showed: DEGENERATIVE DISC DISEASE MOST PROMINENT AT THE T7-8 LEVEL, WHERE THERE IS CORD FLATTENING ON THE RIGHT.      MRI T-spine (10/14/04) showed: SMALL RIGHT PARACENTRAL T7-T8 DISC PROTRUSION.      CT T-spine (11/12/08) showed: MULTILEVEL DEGENERATIVE DISK DISEASE INVOLVING THE T6-7 [id number] T9-10   LEVEL.\"    Last visit was on 4/12/2021.  It was noted that patient was feeling 55% better compared to his follow-up visit the week prior.  Patient was referred to physical therapy.    Was started on prednisone, Flexeril, and ibuprofen.    Follow-up UC visit today 4/24/2021:    Patient reports worsening symptoms.  Reports left lower back pain and tenderness.  Reports that the pain radiates down to his left thigh.  Reports numbness at the front of his left thigh with a burning/stinging pain behind his thigh.  Has been using ibuprofen with no improvement of pain.  Has been using the muscle relaxant sparingly as it makes him drowsy.  Also uses Tylenol PM at night.  Patient reports prolonged driving seems to make the symptoms worse.  Denies other sensory changes, weakness, bowel/bladder incontinence, or other/new symptoms.    Patient was screened prior to rooming and denied COVID-19 diagnosis or contact with a person who has been diagnosed or is suspected to have COVID-19. During this visit, appropriate PPE was worn, hand hygiene was performed, and the patient and any visitors were masked.    PMH: No pertinent past " "medical history to this problem  MEDS: Medications were reviewed in Epic  ALLERGIES: Allergies were reviewed in Epic  SOCHX: Works as a  at [employer]   FH: No pertinent family history to this problem    Review of Systems   Constitutional: Negative.    Gastrointestinal: Negative.    Genitourinary: Negative.    Musculoskeletal: Positive for back pain.   Neurological: Positive for tingling. Negative for weakness.   All other systems reviewed and are negative.    Additional details per HPI.      Objective:     /100   Pulse 66   Temp 36.7 °C (98.1 °F) (Temporal)   Ht 1.803 m (5' 11\") Comment: per patient  Wt 96.4 kg (212 lb 9.6 oz) Comment: w/shoes  BMI 29.65 kg/m²     Physical Exam  Vitals reviewed.   Constitutional:       General: He is not in acute distress.     Appearance: He is well-developed. He is not ill-appearing or toxic-appearing.   HENT:      Head: Normocephalic.      Right Ear: External ear normal.      Left Ear: External ear normal.   Eyes:      General: Vision grossly intact.      Extraocular Movements: Extraocular movements intact.      Conjunctiva/sclera: Conjunctivae normal.   Cardiovascular:      Rate and Rhythm: Normal rate.   Pulmonary:      Effort: Pulmonary effort is normal. No respiratory distress.   Musculoskeletal:         General: No deformity.      Cervical back: Normal range of motion.      Lumbar back: Spasms and tenderness (Left paraspinous musculature) present. No swelling or deformity. Decreased range of motion.   Skin:     General: Skin is warm and dry.      Coloration: Skin is not pale.   Neurological:      Mental Status: He is alert and oriented to person, place, and time.      Sensory: No sensory deficit.      Motor: No weakness.      Coordination: Coordination normal.   Psychiatric:         Behavior: Behavior normal. Behavior is cooperative.       Assessment/Plan:     1. Back strain, subsequent encounter    2. Acute left-sided low back pain with left-sided " sciatica  - lidocaine (LIDODERM) 5 % Patch; Place 1 Patch on the skin every 24 hours as needed. For pain. Patch may remain on up to 12 hours max per 24 hour period.  Dispense: 7 Patch; Refill: 0  - predniSONE (DELTASONE) 20 MG Tab; Take 3 tabs daily x 3 days, then 2 tabs daily x 2 days, then 1 tab x 2 days.  Dispense: 15 tablet; Refill: 0    Condition worsening.  Prescription sent for prednisone and lidocaine patch.  Stop ibuprofen while on steroid.  Continue with acetaminophen as needed.  May use gentle massage, stretching, and range of motion exercises as tolerated.  May apply heat up to 20 minutes at a time.  Physical therapy still pending.  Work restrictions.  Follow-up in 7 days.  Return sooner if symptoms change or worsen.    Differential diagnosis, natural history, supportive care, over-the-counter symptom management per 's instructions, close monitoring, and indications for immediate follow-up discussed.     Patient advised to: Return in about 1 week (around 5/1/2021).    All questions answered. Patient agrees with the plan of care.

## 2021-04-24 NOTE — LETTER
"   [facility] [place] [address] [place] [address]  Phone:  [phone] - Fax:  [phone]   [facility] Progress Report and Disability Certification  Date of Service: 4/24/2021   No Show:  No  Date / Time of Next Visit: 5/1/2021   Claim Information   Patient Name: Keenan Morgan  Claim Number:     Employer:    Date of Injury: 3/18/2021     Insurer / TPA: [employer]  ID / SSN:     Occupation: /APPLICATOR  Diagnosis: Diagnoses of Back strain, subsequent encounter and Acute left-sided low back pain with left-sided sciatica were pertinent to this visit.    Medical Information   Related to Industrial Injury? Yes    Subjective Complaints:  Initial UC visit 3/19/2021 reviewed for continuity of care:    \"DOI: 3/18/21. 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 Ibuprofen helped some, Doans did not. Occasional pain down left arm. Occasional pain down left leg just a little past knee. Hurts essentially entire back. History of back problems, but has not had any significant back pain for 1-2 years.    MRI T-spine (12/11/07) showed: DEGENERATIVE DISC DISEASE MOST PROMINENT AT THE T7-8 LEVEL, WHERE THERE IS CORD FLATTENING ON THE RIGHT.      MRI T-spine (10/14/04) showed: SMALL RIGHT PARACENTRAL T7-T8 DISC PROTRUSION.      CT T-spine (11/12/08) showed: MULTILEVEL DEGENERATIVE DISK DISEASE INVOLVING THE T6-7 [id number] T9-10   LEVEL.\"    Last visit was on 4/12/2021.  It was noted that patient was feeling 55% better compared to his follow-up visit the week prior.  Patient was referred to physical therapy.    Was started on prednisone, Flexeril, and ibuprofen.    Follow-up UC visit today 4/24/2021:    Patient reports worsening symptoms.  Reports left lower back pain and tenderness.  Reports that the pain radiates down to his left thigh.  Reports numbness at the front of his left thigh with a burning/stinging pain behind his thigh.  Has been using ibuprofen with " no improvement of pain.  Has been using the muscle relaxant sparingly as it makes him drowsy.  Also uses Tylenol PM at night.  Patient reports prolonged driving seems to make the symptoms worse.  Denies other sensory changes, weakness, bowel/bladder incontinence, or other/new symptoms.   Objective Findings: Constitutional:       General: He is not in acute distress.     Appearance: He is well-developed. He is not ill-appearing or toxic-appearing.   Musculoskeletal:         General: No deformity.      Cervical back: Normal range of motion.      Lumbar back: Spasms and tenderness (Left paraspinous musculature) present. No swelling or deformity. Decreased range of motion.   Skin:     General: Skin is warm and dry.      Coloration: Skin is not pale.   Neurological:      Mental Status: He is alert and oriented to person, place, and time.      Sensory: No sensory deficit.      Motor: No weakness.      Coordination: Coordination normal.   Pre-Existing Condition(s):     Assessment:   Condition Worsened    Status: Additional Care Required  Permanent Disability:No    Plan: MedicationPT    Diagnostics:      Comments:  Condition worsening.  Prescription sent for prednisone and lidocaine patch.  Stop ibuprofen while on steroid.  Continue with acetaminophen as needed.  May use gentle massage, stretching, and range of motion exercises as tolerated.  May apply heat up   to 20 minutes at a time.  Physical therapy still pending.  Work restrictions.  Follow-up in 7 days.  Return sooner if symptoms change or worsen.    Disability Information   Status: Released to Restricted Duty    From:  4/24/2021  Through: 5/1/2021 Restrictions are: Temporary   Physical Restrictions   Sitting:  < or = to 6 hrs/day  Comments:Must take at least a 15 minute break from sitting each hour Standing:    Stooping:    Bending:  < or = to 1 hr/day   Squatting:  < or = to 1 hr/day Walking:    Climbing:    Pushing:  < or = to 1 hr/day   Pulling:  < or = to 1  hr/day Other:    Reaching Above Shoulder (L):   Reaching Above Shoulder (R):       Reaching Below Shoulder (L):    Reaching Below Shoulder (R):      Not to exceed Weight Limits   Carrying(hrs):   Weight Limit(lb):   Comments:20 lb Lifting(hrs):   Weight  Limit(lb):   Comments:20 lb   Comments:      Repetitive Actions   Hands: i.e. Fine Manipulations from Grasping:     Feet: i.e. Operating Foot Controls:     Driving / Operate Machinery:     Provider Name:   DIANNA Ferreira Physician Signature:  Physician Name:     Clinic Name / Location: [address] [address] Clinic Phone Number: Dept: [phone]   Appointment Time: 9:30 Am Visit Start Time: 9:49 AM   Check-In Time:  9:32 Am Visit Discharge Time:  10:36 AM   Original-Treating Physician or Chiropractor    Page 2-Insurer/TPA    Page 3-Employer    Page 4-Employee

## 2021-05-01 ENCOUNTER — OCCUPATIONAL MEDICINE (OUTPATIENT)
Dept: URGENT CARE | Facility: PHYSICIAN GROUP | Age: 51
End: 2021-05-01
Payer: COMMERCIAL

## 2021-05-01 VITALS
WEIGHT: 214 LBS | TEMPERATURE: 98.3 F | SYSTOLIC BLOOD PRESSURE: 136 MMHG | BODY MASS INDEX: 29.96 KG/M2 | DIASTOLIC BLOOD PRESSURE: 92 MMHG | HEIGHT: 71 IN | RESPIRATION RATE: 16 BRPM | OXYGEN SATURATION: 99 % | HEART RATE: 57 BPM

## 2021-05-01 DIAGNOSIS — S39.012D STRAIN OF LUMBAR REGION, SUBSEQUENT ENCOUNTER: ICD-10-CM

## 2021-05-01 PROCEDURE — 99214 OFFICE O/P EST MOD 30 MIN: CPT | Performed by: PHYSICIAN ASSISTANT

## 2021-05-01 ASSESSMENT — ENCOUNTER SYMPTOMS
BACK PAIN: 1
NAUSEA: 0
VOMITING: 0
DIARRHEA: 0
FEVER: 0
ABDOMINAL PAIN: 0
SHORTNESS OF BREATH: 0
DIZZINESS: 0
CHILLS: 0

## 2021-05-01 ASSESSMENT — FIBROSIS 4 INDEX: FIB4 SCORE: 0.64

## 2021-05-01 NOTE — LETTER
[facility] [place] [address]  Phone:  [phone] - Fax:  [phone]   [facility] Progress Report and Disability Certification  Date of Service: 5/1/2021   No Show:  No  Date / Time of Next Visit: 5/10/2021   Claim Information   Patient Name: Keenan Morgan  Claim Number:     Employer:  [employer] Date of Injury: 3/18/2021     Insurer / TPA: Esis  ID / SSN:     Occupation: /APPLICATOR  Diagnosis: The encounter diagnosis was Strain of lumbar region, subsequent encounter.    Medical Information   Related to Industrial Injury? Yes    Subjective Complaints:  DOI: 3/18/21. 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 Pain is localized to the left side and radiates down the left thigh intermittently. He was given a course of Prednisone at his last visit 1 week ago but hasn't been able to pick it up until today. He has been taking flexeril and ibuprofen as needed with some relief. He returns today for follow up and reports no improvement in his pain, typically 7/10. No bowel/bladder incontinence or distal numbness/tingling.    Objective Findings: Musculoskeletal:      Cervical back: Normal range of motion.      Lumbar back: Tenderness present. No bony tenderness. Decreased range of motion. Positive left straight leg raise test. Negative right straight leg raise test.        Back: Comments: No midline spinal tenderness of step off deformities noted.     Pre-Existing Condition(s):     Assessment:   Condition Same    Status: Discharged / Care Transfer  Comments:transfer to [facility]  Permanent Disability:No    Plan:      Diagnostics:      Comments:       Disability Information   Status: Released to Restricted Duty    From:  5/1/2021  Through: 5/10/2021 Restrictions are: Temporary   Physical Restrictions   Sitting:  < or = to 6 hrs/day Standing:    Stooping:    Bending:  < or = to 1 hr/day      Squatting:  < or = to 1 hr/day Walking:    Climbing:    Pushing:  < or = to 1 hr/day   Pulling:  < or = to 1 hr/day Other:    Reaching Above Shoulder (L):   Reaching Above Shoulder (R):       Reaching Below Shoulder (L):    Reaching Below Shoulder (R):      Not to exceed Weight Limits   Carrying(hrs):   Weight Limit(lb):   Comments:20 lb weight limit Lifting(hrs):   Weight  Limit(lb):   Comments:20 lb weight limit   Comments: Encouraged icing/heating 2-3 times daily followed by gentle massage and stretching  Patient will start course of Prednisone taper today  Continue flexeril as needed  He will follow up with physical therapy as soon as possible  Follow up with [facility] in 7-10 days     Repetitive Actions   Hands: i.e. Fine Manipulations from Grasping:     Feet: i.e. Operating Foot Controls:     Driving / Operate Machinery:     Provider Name:   Kailyn Thompson P.A.-C. Physician Signature:  Physician Name:     Clinic Name / Location: [address] [address] Clinic Phone Number: Dept: [phone]   Appointment Time: 9:30 Am Visit Start Time: 9:51 AM   Check-In Time:  9:29 Am Visit Discharge Time:  10:27AM   Original-Treating Physician or Chiropractor    Page 2-Insurer/TPA    Page 3-Employer    Page 4-Employee

## 2021-05-01 NOTE — PROGRESS NOTES
"Subjective:      Keenan Morgan is a 50 y.o. male who presents with Work-Related Injury (Lower back strain, not doing better )      DOI: 3/18/21. 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 Pain is localized to the left side and radiates down the left thigh intermittently. He was given a course of Prednisone at his last visit 1 week ago but hasn't been able to pick it up until today. He has been taking flexeril and ibuprofen as needed with some relief. He returns today for follow up and reports no improvement in his pain, typically 7/10. No bowel/bladder incontinence or distal numbness/tingling.      HPI    Review of Systems   Constitutional: Negative for chills and fever.   HENT: Negative for congestion.    Respiratory: Negative for shortness of breath.    Cardiovascular: Negative for chest pain.   Gastrointestinal: Negative for abdominal pain, diarrhea, nausea and vomiting.   Genitourinary: Negative.    Musculoskeletal: Positive for back pain.   Skin: Negative for rash.   Neurological: Negative for dizziness.          Objective:     /92   Pulse (!) 57   Temp 36.8 °C (98.3 °F) (Temporal)   Resp 16   Ht 1.803 m (5' 11\")   Wt 97.1 kg (214 lb)   SpO2 99%   BMI 29.85 kg/m²      Physical Exam  Vitals and nursing note reviewed.   Constitutional:       Appearance: Normal appearance. He is well-developed.   HENT:      Head: Normocephalic and atraumatic.      Right Ear: External ear normal.      Left Ear: External ear normal.   Eyes:      Conjunctiva/sclera: Conjunctivae normal.   Cardiovascular:      Rate and Rhythm: Normal rate.   Pulmonary:      Effort: Pulmonary effort is normal.   Musculoskeletal:      Cervical back: Normal range of motion.      Lumbar back: Tenderness present. No bony tenderness. Decreased range of motion. Positive left straight leg raise test. Negative right straight leg raise test.        Back:       Comments: No midline spinal tenderness " of step off deformities noted.    Skin:     General: Skin is warm and dry.   Neurological:      Mental Status: He is alert and oriented to person, place, and time.   Psychiatric:         Behavior: Behavior normal.            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  MEDS:   Current Outpatient Medications:   •  ID NOW COVID-19 Kit, TEST AS DIRECTED, Disp: , Rfl:   •  lidocaine (LIDODERM) 5 % Patch, Place 1 Patch on the skin every 24 hours as needed. For pain. Patch may remain on up to 12 hours max per 24 hour period., Disp: 7 Patch, Rfl: 0  •  ibuprofen (MOTRIN) 600 MG Tab, 1 TAB BY MOUTH EVERY 6 HOURS ONLY IF NEEDED FOR PAIN AND INFLAMMATION. TAKE WITH FOOD., Disp: 28 tablet, Rfl: 0  •  cyclobenzaprine (FLEXERIL) 10 mg Tab, 1 TAB BY MOUTH EVERY 8 HOURS ONLY IF NEEDED FOR PAIN, MUSCLE SPASM, AND/OR MUSCLE TIGHTNESS. MAY CAUSE DROWSINESS., Disp: 21 tablet, Rfl: 0  •  sertraline (ZOLOFT) 50 MG Tab, Take 3 Tabs by mouth every day., Disp: 270 Tab, Rfl: 2  •  hydrOXYzine pamoate (VISTARIL) 25 MG Cap, Take 1 Cap by mouth 3 times a day as needed for Anxiety., Disp: 90 Cap, Rfl: 1  •  predniSONE (DELTASONE) 20 MG Tab, Take 3 tabs daily x 3 days, then 2 tabs daily x 2 days, then 1 tab x 2 days., Disp: 15 tablet, Rfl: 0  ALLERGIES:   Allergies   Allergen Reactions   • Aspirin Anaphylaxis   • Guaifenesin Anaphylaxis   • Penicillin [Penicillin G Potassium] Anaphylaxis     SURGHX:   Past Surgical History:   Procedure Laterality Date   • HIP ARTHROSCOPY Left 12/2/2016    Procedure: HIP ARTHROSCOPY;  Surgeon: Forrest Gil M.D.;  Location: SURGERY [facility];  Service:    • FEMORAL NECK OSTEOPLASTY Left 12/2/2016    Procedure: FEMORAL NECK OSTEOPLASTY;  Surgeon: Forrest Gil M.D.;  Location: SURGERY [facility];  Service:    • ACETABULAR OSTEOPLASTY Left 12/2/2016    Procedure: ACETABULAR  OSTEOPLASTY, POSS PSOAS RELEASE AND YOUNG;  Surgeon: Forrest Gil M.D.;  Location: SURGERY [facility];  Service:      SOCHX:  reports that he has never smoked. His smokeless tobacco use includes snuff and chew. He reports previous alcohol use. He reports that he does not use drugs.  FH: Family history is unknown by patient.       Assessment/Plan:        1. Strain of lumbar region, subsequent encounter    - REFERRAL TO OCCUPATIONAL MEDICINE      Encouraged icing/heating 2-3 times daily followed by gentle massage and stretching   Patient will start course of Prednisone taper today   Continue flexeril as needed   He will follow up with physical therapy as soon as possible   Follow up with [facility] in 7-10 days

## 2021-05-11 ENCOUNTER — OCCUPATIONAL MEDICINE (OUTPATIENT)
Dept: URGENT CARE | Facility: PHYSICIAN GROUP | Age: 51
End: 2021-05-11
Payer: COMMERCIAL

## 2021-05-11 VITALS
OXYGEN SATURATION: 98 % | SYSTOLIC BLOOD PRESSURE: 140 MMHG | HEART RATE: 84 BPM | HEIGHT: 71 IN | TEMPERATURE: 97.8 F | BODY MASS INDEX: 30.66 KG/M2 | DIASTOLIC BLOOD PRESSURE: 90 MMHG | WEIGHT: 219 LBS | RESPIRATION RATE: 14 BRPM

## 2021-05-11 DIAGNOSIS — S39.012D STRAIN OF LUMBAR REGION, SUBSEQUENT ENCOUNTER: ICD-10-CM

## 2021-05-11 DIAGNOSIS — M54.16 LUMBAR RADICULOPATHY: ICD-10-CM

## 2021-05-11 PROCEDURE — 99214 OFFICE O/P EST MOD 30 MIN: CPT | Performed by: PHYSICIAN ASSISTANT

## 2021-05-11 RX ORDER — CYCLOBENZAPRINE HCL 10 MG
TABLET ORAL
Qty: 21 TABLET | Refills: 0 | Status: SHIPPED | OUTPATIENT
Start: 2021-05-11 | End: 2021-06-01 | Stop reason: SDUPTHER

## 2021-05-11 ASSESSMENT — FIBROSIS 4 INDEX: FIB4 SCORE: 0.64

## 2021-05-11 NOTE — LETTER
[place] [place] [address]  Phone:  [phone] - Fax:  [phone]   [facility] Progress Report and Disability Certification  Date of Service: 5/11/2021   No Show:  No  Date / Time of Next Visit: 6/1/2021   Claim Information   Patient Name: Keenan Morgan  Claim Number:     Employer:   [employer] Date of Injury: 3/18/2021     Insurer / TPA: Esis  ID / SSN:     Occupation: /APPLICATOR  Diagnosis: Diagnoses of Strain of lumbar region, subsequent encounter and Lumbar radiculopathy were pertinent to this visit.    Medical Information   Related to Industrial Injury? Yes    Subjective Complaints:  DOI: 3/18/21, 7th visit  LEMUEL: Felt immediate pain in the low back lifting conveyor. Ibuprofen helped the most; APAP helped a small amount. Denies radiating pain, saddle anesthesia, bowel/bladder incontinence, extremity weakness.  History of back problems, but has not had any significant back pain for 1-2 years.    4/24 was feeling worse, prescribed prednisone and a Lidoderm patch.  5/1, reporting no improvement in pain.  4/12, was referred to physical therapy.  5/4, PT was approved for 6 sessions.  5/1, referred to occupational health. CURRENTLY: 6-7/10 today prior to moving a tank at work that weighed approximately 200#, now 8/10 pain. Has had 2 PT sessions; no change in pain but starting to get some feeling back in the left leg.  Does report some pain radiating around the left hip.  Frequently works alone and has to do things even if his restrictions prohibited.   Objective Findings: Back: Slight decreased range of motion in all planes due to pain.    Tenderness in the paraspinous muscles of the lumbar spine, primarily on the left side.  Slight antalgic gait noted.    Appears to have slightly weaker dorsiflexion on the left side.  Sensory deficit noted on the anterior/lateral/posterior left upper leg.  No sensory deficit noted  in the left lower leg.   Pre-Existing Condition(s):     Assessment:   Condition Same    Status: Additional Care Required  Permanent Disability:No    Plan: PTTransfer Care  Comments:occ health    Diagnostics: MRI    Comments:  Patient's next appointment will be at the [facility], preferably in the next 2 to 3 weeks.  Ordering a lumbar MRI.  Patient still has physical therapy sessions remaining.  Refilling Flexeril per his request.  Discussed appropriate dosing of ibupro  fen/acetaminophen as needed for pain.  May use ice, heat, and over-the-counter muscle rubs as needed for additional pain relief.  Refer to D 39 for restrictions.  Discussed red flags and ER precautions.  1. Strain of lumbar region, subsequent encount  er  cyclobenzaprine (FLEXERIL) 10 mg Tab   MR-LUMBAR SPINE-W/O   REFERRAL TO RADIOLOGY  2. Lumbar radiculopathy  MR-LUMBAR SPINE-W/O   REFERRAL TO RADIOLOGY        Disability Information   Status: Released to Restricted Duty    From:  5/11/2021  Through: 6/1/2021 Restrictions are: Temporary   Physical Restrictions   Sitting:    Standing:    Stooping:    Bending:      Squatting:    Walking:    Climbing:    Pushing:      Pulling:    Other:    Reaching Above Shoulder (L):   Reaching Above Shoulder (R):       Reaching Below Shoulder (L):    Reaching Below Shoulder (R):      Not to exceed Weight Limits   Carrying(hrs):   Weight Limit(lb):   Lifting(hrs):   Weight  Limit(lb):     Comments: Maintain previous restrictions    Repetitive Actions   Hands: i.e. Fine Manipulations from Grasping:     Feet: i.e. Operating Foot Controls:     Driving / Operate Machinery:     Provider Name:   Rachael Birmingham P.A.-C. Physician Signature:  Physician Name:     Clinic Name / Location: [address] [address] Clinic Phone Number: Dept: [phone]   Appointment Time: 6:20 Pm Visit Start Time: 5:31 PM   Check-In Time:  5:29 Pm Visit Discharge Time: 6:06 Pm       Original-Treating Physician or Chiropractor    Page 2-Insurer/TPA    Page 3-Employer    Page 4-Employee

## 2021-05-12 NOTE — PROGRESS NOTES
"Chief Complaint   Patient presents with   • Back Pain       HISTORY OF PRESENT ILLNESS: Patient is a 50 y.o. male who presents today for the following:    DOI: 3/18/21, 7th visit  LEMUEL: Felt immediate pain in the low back lifting conveyor. Ibuprofen helped the most; APAP helped a small amount. Denies radiating pain, saddle anesthesia, bowel/bladder incontinence, extremity weakness.  History of back problems, but has not had any significant back pain for 1-2 years.    4/24 was feeling worse, prescribed prednisone and a Lidoderm patch.  5/1, reporting no improvement in pain.  4/12, was referred to physical therapy.  5/4, PT was approved for 6 sessions.  5/1, referred to occupational health. CURRENTLY: 6-7/10 today prior to moving a tank at work that weighed approximately 200#, now 8/10 pain. Has had 2 PT sessions; no change in pain but starting to get some feeling back in the left leg.  Does report some pain radiating around the left hip.  Frequently works alone and has to do things even if his restrictions prohibited.    Allergies:Aspirin, Guaifenesin, and Penicillin [penicillin g potassium]    PMH: No pertinent past medical history to this problem  MEDS: Medications were reviewed in Epic  ALLERGIES: Allergies were reviewed in Epic  SOCHX: Works for [employer]  FH: No pertinent family history to this problem    Review of Systems:    Constitutional ROS: No unexpected change in weight, No weakness, No fatigue  Musculoskeletal/Extremities ROS: Positive for back pain    Exam:  /90   Pulse 84   Temp 36.6 °C (97.8 °F) (Temporal)   Resp 14   Ht 1.803 m (5' 11\")   Wt 99.3 kg (219 lb)   SpO2 98%   General: Well developed, well nourished. No distress.  Pulmonary: Unlabored respiratory effort.   Neurologic: Grossly nonfocal. No facial asymmetry noted.  Back: Slight decreased range of motion in all planes due to pain.    Tenderness in the paraspinous muscles of the lumbar spine, primarily on the left side.  Slight " antalgic gait noted.    Appears to have slightly weaker dorsiflexion on the left side.  Sensory deficit noted on the anterior/lateral/posterior left upper leg.  No sensory deficit noted in the left lower leg.  Skin: Warm, dry, good turgor. No rashes in visible areas.   Psych: Normal mood. Alert and oriented x3. Judgment and insight is normal.       Assessment/Plan:  Patient's next appointment will be at the [facility], preferably in the next 2 to 3 weeks.  Ordering a lumbar MRI.  Patient still has physical therapy sessions remaining.  Refilling Flexeril per his request.  Discussed appropriate dosing of ibuprofen/acetaminophen as needed for pain.  May use ice, heat, and over-the-counter muscle rubs as needed for additional pain relief.  Refer to D 39 for restrictions.  Discussed red flags and ER precautions.  1. Strain of lumbar region, subsequent encounter  cyclobenzaprine (FLEXERIL) 10 mg Tab    MR-LUMBAR SPINE-W/O    REFERRAL TO RADIOLOGY   2. Lumbar radiculopathy  MR-LUMBAR SPINE-W/O    REFERRAL TO RADIOLOGY

## 2021-06-01 ENCOUNTER — OCCUPATIONAL MEDICINE (OUTPATIENT)
Dept: OCCUPATIONAL MEDICINE | Facility: CLINIC | Age: 51
End: 2021-06-01
Payer: COMMERCIAL

## 2021-06-01 VITALS
WEIGHT: 217 LBS | HEIGHT: 71 IN | TEMPERATURE: 98.1 F | OXYGEN SATURATION: 94 % | BODY MASS INDEX: 30.38 KG/M2 | SYSTOLIC BLOOD PRESSURE: 128 MMHG | HEART RATE: 75 BPM | DIASTOLIC BLOOD PRESSURE: 82 MMHG

## 2021-06-01 DIAGNOSIS — S39.012D STRAIN OF LUMBAR REGION, SUBSEQUENT ENCOUNTER: ICD-10-CM

## 2021-06-01 PROCEDURE — 99213 OFFICE O/P EST LOW 20 MIN: CPT | Performed by: NURSE PRACTITIONER

## 2021-06-01 RX ORDER — CYCLOBENZAPRINE HCL 10 MG
TABLET ORAL
Qty: 21 TABLET | Refills: 0 | Status: SHIPPED | OUTPATIENT
Start: 2021-06-01 | End: 2021-09-22

## 2021-06-01 ASSESSMENT — FIBROSIS 4 INDEX: FIB4 SCORE: 0.64

## 2021-06-01 NOTE — LETTER
[address],   [address] [place] [address]  Phone:  [phone] - Fax:  [phone]   Occupational Health [facility] Progress Report and Disability Certification  Date of Service: 6/1/2021   No Show:  No  Date / Time of Next Visit: 6/22/2021 @ 4:30pm   Claim Information   Patient Name: Keenan Morgan  Claim Number:     Employer:   [employer] Date of Injury:      Insurer / TPA: Esis  ID / SSN:     Occupation:   Diagnosis: The encounter diagnosis was Strain of lumbar region, subsequent encounter.    Medical Information   Related to Industrial Injury? Yes    Subjective Complaints:  DOI: 3/18/21 LEMUEL: Felt immediate pain in the low back lifting conveyor. Patient seen in Urgent Care x 7. He was treated with Prednisone and Lidocaine patches with no relief. Today symptoms remain unchanged, overall approval 25%. Patient states that he has loss of feeling in left thigh, sharp pain in both groins, sharp pain in low back, and loss of strength in left leg. Patient denies bowel/bladder dysfunction, saddle anesthesia, and no leg weakness. He has used Ibuprofen and flexeril as needed with moderate relief. He is taking tylenol pm to sleep. He applying heat, Stim, and some massage at home. He is doing some stretching exercises with mild relief. He is tolerating light duty without difficulty. He is scheduled for MRI 6/19/21. He completed physical therapy with varied results, depending on what exercises he was doing. He states that these treatments would only last a week then symptoms return.    Objective Findings: Lumbar: No gross deformity or discolorations noted.  Mild tenderness to paraspinal musculature L3-S1 and left SI joint.  Neg decreased flexion or rotation.  Straight leg test positive on the left, negative on the right.  Able to heel/toe walk with minimal difficulty. Cranial nerves grossly intact.  Achilles and patellar reflexes 2+ bilaterally.    Sensation intact. No gait abnormalities      Pre-Existing Condition(s):     Assessment:   Condition Same    Status: Discharged / Care Transfer  Permanent Disability:No    Plan: Medication (NOT at Work)DiagnosticsTransfer Care    Diagnostics: MRI    Comments:  Follow-up in 3 weeks, if not seen by Physiatry  Physiatry referral placed, transfer care   Work restrictions, per restrictions  MRI scheduled for 6/19/21  Continue gentle range of motion and stretching as tolerated  Take cyclobenzaprine as prescribed   DO NOT DRIVE or WORK while taking this medications  Continue rest, ice/heat application, and OTC Bio-Freeze, Hendrix Balm, or Voltaren Gel       Pt understands to go to ED for immediate reevaluation for increased pain, numbness/weakness of lower extre  mities, or incontinence of bowel or bladder.     Disability Information   Status: Released to Restricted Duty    From:  6/1/2021  Through: 6/22/2021 Restrictions are: Temporary   Physical Restrictions   Sitting:  < or = to 6 hrs/day Standing:    Stooping:    Bending:      Squatting:    Walking:    Climbing:    Pushing:      Pulling:    Other:    Reaching Above Shoulder (L):   Reaching Above Shoulder (R):       Reaching Below Shoulder (L):    Reaching Below Shoulder (R):      Not to exceed Weight Limits   Carrying(hrs):   Weight Limit(lb): < or = to 25 pounds Lifting(hrs):   Weight  Limit(lb): < or = to 25 pounds   Comments:      Repetitive Actions   Hands: i.e. Fine Manipulations from Grasping:     Feet: i.e. Operating Foot Controls:     Driving / Operate Machinery:     Provider Name:   DIANNA Stinson Physician Signature:  Physician Name:     Clinic Name / Location: [address] [address] Clinic Phone Number: Dept: [phone]   Appointment Time: 3:30 Pm Visit Start Time: 3:44 PM   Check-In Time:  3:40 Pm Visit Discharge Time:  4:30pm   Original-Treating Physician or Chiropractor    Page  2-Insurer/TPA    Page 3-Employer    Page 4-Employee

## 2021-06-02 NOTE — PROGRESS NOTES
"Subjective:     Keenan Morgan is a 50 y.o. male who presents for Other (WC DOI 3/18/21 back injury, feeling the same room 17)      DOI: 3/18/21 LEMUEL: Felt immediate pain in the low back lifting conveyor. Patient seen in Urgent Care x 7. He was treated with Prednisone and Lidocaine patches with no relief. Today symptoms remain unchanged, overall approval 25%. Patient states that he has loss of feeling in left thigh, sharp pain in both groins, sharp pain in low back, and loss of strength in left leg. Patient denies bowel/bladder dysfunction, saddle anesthesia, and no leg weakness. He has used Ibuprofen and flexeril as needed with moderate relief. He is taking tylenol pm to sleep. He applying heat, Stim, and some massage at home. He is doing some stretching exercises with mild relief. He is tolerating light duty without difficulty. He is scheduled for MRI 6/19/21. He completed physical therapy with varied results, depending on what exercises he was doing. He states that these treatments would only last a week then symptoms return.     ROS: All systems were reviewed on intake form, form was reviewed and signed. See scanned documents in media. Pertinent positives and negatives included in HPI.    PMH: No pertinent past medical history to this problem  MEDS: Medications were reviewed in Epic  ALLERGIES:   Allergies   Allergen Reactions   • Aspirin Anaphylaxis   • Guaifenesin Anaphylaxis   • Penicillin [Penicillin G Potassium] Anaphylaxis     SOCHX: Works for [employer]  FH: No pertinent family history to this problem       Objective:     /82   Pulse 75   Temp 36.7 °C (98.1 °F)   Ht 1.803 m (5' 11\")   Wt 98.4 kg (217 lb)   SpO2 94%   BMI 30.27 kg/m²     [unfilled]    Lumbar: No gross deformity or discolorations noted.  Mild tenderness to paraspinal musculature L3-S1 and left SI joint.  Neg decreased flexion or rotation.  Straight leg test positive on the left, negative on the right.  Able to heel/toe " walk with minimal difficulty. Cranial nerves grossly intact.  Achilles and patellar reflexes 2+ bilaterally.   Sensation intact. No gait abnormalities       Assessment/Plan:       1. Strain of lumbar region, subsequent encounter  - REFERRAL TO OUTPATIENT INTERVENTIONAL PAIN CLINIC  - cyclobenzaprine (FLEXERIL) 10 mg Tab; 1 TAB BY MOUTH EVERY 8 HOURS ONLY IF NEEDED FOR PAIN, MUSCLE SPASM, AND/OR MUSCLE TIGHTNESS. MAY CAUSE DROWSINESS.  Dispense: 21 tablet; Refill: 0    Released to Restricted Duty FROM 6/1/2021 TO 6/22/2021     Follow-up in 3 weeks, if not seen by Physiatry  Physiatry referral placed, transfer care   Work restrictions, per restrictions  MRI scheduled for 6/19/21  Continue gentle range of motion and stretching as tolerated  Take cyclobenzaprine as prescribed   DO NOT DRIVE or WORK while taking this medications  Continue rest, ice/heat application, and OTC Bio-Freeze, Shubert Balm, or Voltaren Gel       Pt understands to go to ED for immediate reevaluation for increased pain, numbness/weakness of lower extre  mities, or incontinence of bowel or bladder.     Differential diagnosis, natural history, supportive care, and indications for immediate follow-up discussed.    Approximately 25 minutes were spent in reviewing notes, preparing for visit, obtaining history, exam and evaluation, patient counseling/education and post visit documentation/orders.

## 2021-06-22 DIAGNOSIS — M54.16 LUMBAR RADICULOPATHY: ICD-10-CM

## 2021-06-22 DIAGNOSIS — S39.012D STRAIN OF LUMBAR REGION, SUBSEQUENT ENCOUNTER: ICD-10-CM

## 2021-09-03 DIAGNOSIS — F41.1 GENERALIZED ANXIETY DISORDER: ICD-10-CM

## 2021-09-03 NOTE — TELEPHONE ENCOUNTER
Was the patient seen in the last year in this department? Yes 9.8.2020    Does patient have an active prescription for medications requested? Yes    Received Request Via: Pharmacy    No visits with results within 1 Year(s) from this visit.   Latest known visit with results is:   Hospital Outpatient Visit on 08/14/2020   Component Date Value   • Cholesterol,Tot 08/14/2020 183    • Triglycerides 08/14/2020 157*   • HDL 08/14/2020 34*   • LDL 08/14/2020 118*   • 25-Hydroxy   Vitamin D 25 08/14/2020 23*   • Free T-4 08/14/2020 1.26    • TSH 08/14/2020 1.250    • Glycohemoglobin 08/14/2020 5.4    • Est Avg Glucose 08/14/2020 108    • Sodium 08/14/2020 133*   • Potassium 08/14/2020 4.6    • Chloride 08/14/2020 96    • Co2 08/14/2020 24    • Anion Gap 08/14/2020 13.0    • Glucose 08/14/2020 108*   • Bun 08/14/2020 9    • Creatinine 08/14/2020 0.82    • Calcium 08/14/2020 9.4    • AST(SGOT) 08/14/2020 15    • ALT(SGPT) 08/14/2020 19    • Alkaline Phosphatase 08/14/2020 42    • Total Bilirubin 08/14/2020 0.4    • Albumin 08/14/2020 4.8    • Total Protein 08/14/2020 7.3    • Globulin 08/14/2020 2.5    • A-G Ratio 08/14/2020 1.9    • WBC 08/14/2020 4.6*   • RBC 08/14/2020 5.15    • Hemoglobin 08/14/2020 15.4    • Hematocrit 08/14/2020 46.5    • MCV 08/14/2020 90.3    • MCH 08/14/2020 29.9    • MCHC 08/14/2020 33.1*   • RDW 08/14/2020 42.9    • Platelet Count 08/14/2020 281    • MPV 08/14/2020 10.7    • Neutrophils-Polys 08/14/2020 49.20    • Lymphocytes 08/14/2020 37.60    • Monocytes 08/14/2020 7.90    • Eosinophils 08/14/2020 3.70    • Basophils 08/14/2020 0.90    • Immature Granulocytes 08/14/2020 0.70    • Nucleated RBC 08/14/2020 0.00    • Neutrophils (Absolute) 08/14/2020 2.25    • Lymphs (Absolute) 08/14/2020 1.72    • Monos (Absolute) 08/14/2020 0.36    • Eos (Absolute) 08/14/2020 0.17    • Baso (Absolute) 08/14/2020 0.04    • Immature Granulocytes (a* 08/14/2020 0.03    • NRBC (Absolute) 08/14/2020 0.00    • GFR If   08/14/2020 >60    • GFR If Non  [employer]* 08/14/2020 >60    ]

## 2021-09-10 ENCOUNTER — TELEPHONE (OUTPATIENT)
Dept: MEDICAL GROUP | Facility: CLINIC | Age: 51
End: 2021-09-10

## 2021-09-10 NOTE — TELEPHONE ENCOUNTER
DOCUMENTATION OF PAR STATUS:    1. Name of Medication & Dose: Sertraline     2. Name of Prescription Coverage Company & phone #: [employer]    3. Date Prior Auth Submitted: 9.10.21    4. What information was given to obtain insurance decision? Questions answered    5. Prior Auth Status? Pending    6. Patient Notified: N\A

## 2021-09-22 ENCOUNTER — OFFICE VISIT (OUTPATIENT)
Dept: MEDICAL GROUP | Facility: CLINIC | Age: 51
End: 2021-09-22
Payer: COMMERCIAL

## 2021-09-22 VITALS
OXYGEN SATURATION: 98 % | TEMPERATURE: 98.4 F | RESPIRATION RATE: 16 BRPM | HEIGHT: 71 IN | SYSTOLIC BLOOD PRESSURE: 120 MMHG | BODY MASS INDEX: 30.52 KG/M2 | WEIGHT: 218 LBS | HEART RATE: 60 BPM | DIASTOLIC BLOOD PRESSURE: 76 MMHG

## 2021-09-22 DIAGNOSIS — R79.9 ABNORMAL FINDING OF BLOOD CHEMISTRY: ICD-10-CM

## 2021-09-22 DIAGNOSIS — J02.9 SORE THROAT: ICD-10-CM

## 2021-09-22 DIAGNOSIS — E66.09 CLASS 1 OBESITY DUE TO EXCESS CALORIES WITHOUT SERIOUS COMORBIDITY WITH BODY MASS INDEX (BMI) OF 30.0 TO 30.9 IN ADULT: ICD-10-CM

## 2021-09-22 DIAGNOSIS — E55.9 VITAMIN D DEFICIENCY: ICD-10-CM

## 2021-09-22 DIAGNOSIS — E78.2 MIXED HYPERLIPIDEMIA: ICD-10-CM

## 2021-09-22 DIAGNOSIS — F41.1 GENERALIZED ANXIETY DISORDER: ICD-10-CM

## 2021-09-22 DIAGNOSIS — Z12.11 SCREENING FOR COLORECTAL CANCER: ICD-10-CM

## 2021-09-22 DIAGNOSIS — J30.2 SEASONAL ALLERGIES: ICD-10-CM

## 2021-09-22 DIAGNOSIS — R09.81 CHRONIC NASAL CONGESTION: ICD-10-CM

## 2021-09-22 DIAGNOSIS — Z12.12 SCREENING FOR COLORECTAL CANCER: ICD-10-CM

## 2021-09-22 PROBLEM — E66.3 OVERWEIGHT WITH BODY MASS INDEX (BMI) 25.0-29.9: Status: RESOLVED | Noted: 2019-02-01 | Resolved: 2021-09-22

## 2021-09-22 PROBLEM — E66.1 CLASS 1 DRUG-INDUCED OBESITY WITHOUT SERIOUS COMORBIDITY WITH BODY MASS INDEX (BMI) OF 30.0 TO 30.9 IN ADULT: Status: ACTIVE | Noted: 2021-09-22

## 2021-09-22 PROBLEM — E66.9 OBESITY (BMI 30-39.9): Status: ACTIVE | Noted: 2021-09-22

## 2021-09-22 PROBLEM — Z00.00 WELLNESS EXAMINATION: Status: RESOLVED | Noted: 2020-08-11 | Resolved: 2021-09-22

## 2021-09-22 PROBLEM — E66.811 CLASS 1 DRUG-INDUCED OBESITY WITHOUT SERIOUS COMORBIDITY WITH BODY MASS INDEX (BMI) OF 30.0 TO 30.9 IN ADULT: Status: ACTIVE | Noted: 2021-09-22

## 2021-09-22 PROCEDURE — 99214 OFFICE O/P EST MOD 30 MIN: CPT | Performed by: PHYSICIAN ASSISTANT

## 2021-09-22 RX ORDER — HYDROXYZINE PAMOATE 25 MG/1
25 CAPSULE ORAL 3 TIMES DAILY PRN
COMMUNITY
End: 2023-07-25

## 2021-09-22 RX ORDER — VITAMIN B COMPLEX
2000 TABLET ORAL DAILY
COMMUNITY

## 2021-09-22 RX ORDER — PSEUDOEPHED/ACETAMINOPH/DIPHEN 30MG-500MG
TABLET ORAL
COMMUNITY
Start: 2021-06-30

## 2021-09-22 RX ORDER — FEXOFENADINE HCL 60 MG/1
60 TABLET, FILM COATED ORAL DAILY
Qty: 90 TABLET | Refills: 1 | Status: SHIPPED | OUTPATIENT
Start: 2021-09-22 | End: 2022-03-02 | Stop reason: SDUPTHER

## 2021-09-22 RX ORDER — TRIAMCINOLONE ACETONIDE 55 UG/1
2 SPRAY, METERED NASAL DAILY
Qty: 16.9 ML | Refills: 2 | Status: SHIPPED | OUTPATIENT
Start: 2021-09-22 | End: 2022-03-02

## 2021-09-22 ASSESSMENT — ENCOUNTER SYMPTOMS
SINUS PAIN: 1
NERVOUS/ANXIOUS: 1
RESPIRATORY NEGATIVE: 1
EYES NEGATIVE: 1
CARDIOVASCULAR NEGATIVE: 1
CONSTITUTIONAL NEGATIVE: 1
SORE THROAT: 1
GASTROINTESTINAL NEGATIVE: 1
MUSCULOSKELETAL NEGATIVE: 1
NEUROLOGICAL NEGATIVE: 1

## 2021-09-22 ASSESSMENT — VISUAL ACUITY: OU: 1

## 2021-09-22 ASSESSMENT — PATIENT HEALTH QUESTIONNAIRE - PHQ9
CLINICAL INTERPRETATION OF PHQ2 SCORE: 1
5. POOR APPETITE OR OVEREATING: 0 - NOT AT ALL
SUM OF ALL RESPONSES TO PHQ QUESTIONS 1-9: 5

## 2021-09-22 ASSESSMENT — FIBROSIS 4 INDEX: FIB4 SCORE: 0.64

## 2021-09-22 NOTE — PROGRESS NOTES
Subjective   Keenan Morgan is a 50 y.o. male who presents with Sinus Problem, Pharyngitis, and Back Pain    1. Chronic nasal congestion  Chronic condition for this patient which has become worse with the worsening in air quality in the area.  Having nasal congestion, maxillary sinus pressure and wakes every morning with a sore throat.  Nasal mucus is clear in color.  Patient does have seasonal allergies so they are likely contributing to his symptoms.  We will start him on fexofenadine 60 mg daily and Nasacort 2 sprays in each nostril daily.  We will follow-up in 2 weeks or sooner should his symptoms worsen.  - fexofenadine (ALLEGRA) 60 MG Tab; Take 1 Tablet by mouth every day.  Dispense: 90 Tablet; Refill: 1  - triamcinolone (NASACORT) 55 MCG/ACT nasal inhaler; Administer 2 Sprays into affected nostril(S) every day.  Dispense: 16.9 mL; Refill: 2    2. Seasonal allergies  Made worse by the smoke.  We will start on fexofenadine and triamcinolone nasal spray.    3. Sore throat  States he has been waking with a sore throat for the past week.  Is having chronic sinus drainage also.  Physical exam shows no erythema or exudate, uvula midline, tonsils 1+ and cobblestoning in the back of the throat.  Recommend salt water gargles, warm drinks, over-the-counter throat lozenges to help control the discomfort.    4. EVIE (generalized anxiety disorder)  Patient is currently being treated with medication for an emotional disorder. Taking meds every day and denies worsening of sx, SI or HI. Advised on non-pharmaceutical means of controlling and dealing with emotions.  Requesting refills today.  - sertraline (ZOLOFT) 50 MG Tab; Take 2 Tablets by mouth every day.  Dispense: 180 Tablet; Refill: 3    5. Vitamin D deficiency  Patient is currently being treated with supplemental vitamin D for measured deficiency. Taking extra vitamin D in addition to trying to include more in diet. No current side effects or issues.   Currently taking vitamin D3 2000 units daily.  Due for routine labs to check levels.  - VITAMIN D,25 HYDROXY; Future    6. Mixed hyperlipidemia  Chronic condition.  Not currently on any medications. Due for repeat labs.  - Lipid Profile; Future    7. Abnormal finding of blood chemistry  - Comp Metabolic Panel; Future    8. Screening for colorectal cancer  - OCCULT BLOOD FECES IMMUNOASSAY (FIT); Future    9. Class 1 obesity due to excess calories without serious comorbidity with body mass index (BMI) of 30.0 to 30.9 in adult  Body mass index is 30.4 kg/m². Patient has been diagnosed with obesity and again has been counseled on caloric and carbohydrate restriction along with increasing exercise to 30 minutes 5 or more times a week.  - Patient identified as having weight management issue.  Appropriate orders and counseling given.    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  No date: Hypertension      Comment:  history of; MD took pt off medication approx 2011  No date: Pain      Comment:  left hip  Past Surgical History:  12/2/2016: HIP ARTHROSCOPY; Left      Comment:  Procedure: HIP ARTHROSCOPY;  Surgeon: Forrest Gil M.D.;  Location: [facility];  Service:   12/2/2016: FEMORAL NECK OSTEOPLASTY; Left      Comment:  Procedure: FEMORAL NECK OSTEOPLASTY;  Surgeon: Forrest Gil M.D.;  Location: [facility];                 Service:   12/2/2016: ACETABULAR OSTEOPLASTY; Left      Comment:  Procedure: ACETABULAR OSTEOPLASTY, POSS PSOAS RELEASE                AND YOUNG;  Surgeon: Forrest Gil M.D.;  Location:                [facility];  Service:   Social History    Tobacco Use      Smoking status: Never Smoker      Smokeless tobacco: Current User        Types: Snuff      Tobacco comment: 2 cans/week; used since age 6 years     Vaping Use      Vaping Use: Never  "used    Alcohol use: Not Currently      Comment: 6-8 beers per day, stopped alcohol 2011    Drug use: No    No family history on file.      Current Outpatient Medications: •  ACETAMINOPHEN EXTRA STRENGTH 500 MG Tab, , Disp: , Rfl: •  hydrOXYzine pamoate (VISTARIL) 25 MG Cap, Take 25 mg by mouth 3 times a day as needed., Disp: , Rfl: •  vitamin D3 (CHOLECALCIFEROL) 1000 Unit (25 mcg) Tab, Take 2,000 Units by mouth every day., Disp: , Rfl: •  fexofenadine (ALLEGRA) 60 MG Tab, Take 1 Tablet by mouth every day., Disp: 90 Tablet, Rfl: 1•  triamcinolone (NASACORT) 55 MCG/ACT nasal inhaler, Administer 2 Sprays into affected nostril(S) every day., Disp: 16.9 mL, Rfl: 2•  sertraline (ZOLOFT) 50 MG Tab, Take 2 Tablets by mouth every day., Disp: 180 Tablet, Rfl: 3    Patient was instructed on the use of medications, either prescriptions or OTC and informed on when the appropriate follow up time period should be. In addition, patient was also instructed that should any acute worsening occur that they should notify this clinic asap or call 911.      Review of Systems   Constitutional: Negative.    HENT: Positive for congestion, sinus pain and sore throat.    Eyes: Negative.    Respiratory: Negative.    Cardiovascular: Negative.    Gastrointestinal: Negative.    Genitourinary: Negative.    Musculoskeletal: Negative.    Skin: Negative.    Neurological: Negative.    Endo/Heme/Allergies: Negative.    Psychiatric/Behavioral: The patient is nervous/anxious.      Objective     /76 (BP Location: Left arm, Patient Position: Sitting, BP Cuff Size: Large adult)   Pulse 60   Temp 36.9 °C (98.4 °F) (Temporal)   Resp 16   Ht 1.803 m (5' 11\") Comment: stated by pt  Wt 98.9 kg (218 lb) Comment: with shoes on  SpO2 98%   BMI 30.40 kg/m²      Physical Exam  Vitals and nursing note reviewed.   Constitutional:       Appearance: Normal appearance. He is well-developed and well-groomed.   HENT:      Head: Normocephalic and atraumatic.    "   Nose: Nasal tenderness, mucosal edema, congestion and rhinorrhea present. Rhinorrhea is clear.      Right Turbinates: Swollen and pale.      Left Turbinates: Swollen and pale.      Right Sinus: Maxillary sinus tenderness present.      Left Sinus: Maxillary sinus tenderness present.      Mouth/Throat:      Lips: Pink. No lesions.      Mouth: Mucous membranes are moist.      Pharynx: Oropharynx is clear. Uvula midline. No oropharyngeal exudate or posterior oropharyngeal erythema.      Tonsils: No tonsillar exudate. 1+ on the right. 1+ on the left.   Eyes:      General: Lids are normal. Vision grossly intact. Gaze aligned appropriately.      Extraocular Movements: Extraocular movements intact.      Conjunctiva/sclera: Conjunctivae normal.      Pupils: Pupils are equal, round, and reactive to light.   Neck:      Thyroid: No thyromegaly.      Vascular: No carotid bruit or JVD.      Trachea: Trachea and phonation normal.   Cardiovascular:      Rate and Rhythm: Normal rate and regular rhythm.      Heart sounds: Normal heart sounds. No murmur heard.   No friction rub. No gallop.    Pulmonary:      Effort: Pulmonary effort is normal.      Breath sounds: Normal breath sounds. No wheezing, rhonchi or rales.   Musculoskeletal:         General: Normal range of motion.      Cervical back: Normal range of motion and neck supple.      Right lower leg: No edema.      Left lower leg: No edema.   Lymphadenopathy:      Cervical: No cervical adenopathy.   Skin:     General: Skin is warm and dry.      Capillary Refill: Capillary refill takes less than 2 seconds.      Findings: No lesion or rash.   Neurological:      Mental Status: He is alert and oriented to person, place, and time.      Cranial Nerves: Cranial nerves are intact.   Psychiatric:         Attention and Perception: Attention and perception normal.         Mood and Affect: Affect normal. Mood is anxious.         Speech: Speech normal.         Behavior: Behavior normal.  Behavior is cooperative.         Thought Content: Thought content normal.         Judgment: Judgment normal.       Assessment & Plan      1. Chronic nasal congestion  - fexofenadine (ALLEGRA) 60 MG Tab; Take 1 Tablet by mouth every day.  Dispense: 90 Tablet; Refill: 1  - triamcinolone (NASACORT) 55 MCG/ACT nasal inhaler; Administer 2 Sprays into affected nostril(S) every day.  Dispense: 16.9 mL; Refill: 2    2. Seasonal allergies    3. Sore throat    4. EVIE (generalized anxiety disorder)  - sertraline (ZOLOFT) 50 MG Tab; Take 2 Tablets by mouth every day.  Dispense: 180 Tablet; Refill: 3    5. Vitamin D deficiency  - VITAMIN D,25 HYDROXY; Future    6. Mixed hyperlipidemia  - Lipid Profile; Future    7. Abnormal finding of blood chemistry  - Comp Metabolic Panel; Future    8. Screening for colorectal cancer  - OCCULT BLOOD FECES IMMUNOASSAY (FIT); Future    9. Class 1 obesity due to excess calories without serious comorbidity with body mass index (BMI) of 30.0 to 30.9 in adult  - Patient identified as having weight management issue.  Appropriate orders and counseling given.

## 2021-09-24 ENCOUNTER — HOSPITAL ENCOUNTER (OUTPATIENT)
Facility: MEDICAL CENTER | Age: 51
End: 2021-09-24
Attending: PHYSICIAN ASSISTANT
Payer: COMMERCIAL

## 2021-09-24 ENCOUNTER — NON-PROVIDER VISIT (OUTPATIENT)
Dept: MEDICAL GROUP | Facility: CLINIC | Age: 51
End: 2021-09-24
Payer: COMMERCIAL

## 2021-09-24 DIAGNOSIS — E78.2 MIXED HYPERLIPIDEMIA: ICD-10-CM

## 2021-09-24 DIAGNOSIS — E55.9 VITAMIN D DEFICIENCY: ICD-10-CM

## 2021-09-24 DIAGNOSIS — R79.9 ABNORMAL FINDING OF BLOOD CHEMISTRY: ICD-10-CM

## 2021-09-24 PROCEDURE — 80053 COMPREHEN METABOLIC PANEL: CPT

## 2021-09-24 PROCEDURE — 36415 COLL VENOUS BLD VENIPUNCTURE: CPT | Performed by: PHYSICIAN ASSISTANT

## 2021-09-24 PROCEDURE — 82306 VITAMIN D 25 HYDROXY: CPT

## 2021-09-24 PROCEDURE — 80061 LIPID PANEL: CPT

## 2021-09-25 LAB
25(OH)D3 SERPL-MCNC: 41 NG/ML (ref 30–100)
ALBUMIN SERPL BCP-MCNC: 4.7 G/DL (ref 3.2–4.9)
ALBUMIN/GLOB SERPL: 1.7 G/DL
ALP SERPL-CCNC: 48 U/L (ref 30–99)
ALT SERPL-CCNC: 19 U/L (ref 2–50)
ANION GAP SERPL CALC-SCNC: 13 MMOL/L (ref 7–16)
AST SERPL-CCNC: 23 U/L (ref 12–45)
BILIRUB SERPL-MCNC: 0.4 MG/DL (ref 0.1–1.5)
BUN SERPL-MCNC: 6 MG/DL (ref 8–22)
CALCIUM SERPL-MCNC: 9.7 MG/DL (ref 8.5–10.5)
CHLORIDE SERPL-SCNC: 101 MMOL/L (ref 96–112)
CHOLEST SERPL-MCNC: 186 MG/DL (ref 100–199)
CO2 SERPL-SCNC: 23 MMOL/L (ref 20–33)
CREAT SERPL-MCNC: 0.82 MG/DL (ref 0.5–1.4)
GLOBULIN SER CALC-MCNC: 2.8 G/DL (ref 1.9–3.5)
GLUCOSE SERPL-MCNC: 123 MG/DL (ref 65–99)
HDLC SERPL-MCNC: 39 MG/DL
LDLC SERPL CALC-MCNC: 124 MG/DL
POTASSIUM SERPL-SCNC: 4.1 MMOL/L (ref 3.6–5.5)
PROT SERPL-MCNC: 7.5 G/DL (ref 6–8.2)
SODIUM SERPL-SCNC: 137 MMOL/L (ref 135–145)
TRIGL SERPL-MCNC: 115 MG/DL (ref 0–149)

## 2021-10-08 ENCOUNTER — OFFICE VISIT (OUTPATIENT)
Dept: MEDICAL GROUP | Facility: CLINIC | Age: 51
End: 2021-10-08
Payer: COMMERCIAL

## 2021-10-08 VITALS
HEIGHT: 71 IN | TEMPERATURE: 98 F | DIASTOLIC BLOOD PRESSURE: 58 MMHG | SYSTOLIC BLOOD PRESSURE: 124 MMHG | HEART RATE: 54 BPM | BODY MASS INDEX: 30.24 KG/M2 | WEIGHT: 216 LBS | OXYGEN SATURATION: 99 %

## 2021-10-08 DIAGNOSIS — G89.29 CHRONIC MIDLINE LOW BACK PAIN WITH LEFT-SIDED SCIATICA: ICD-10-CM

## 2021-10-08 DIAGNOSIS — M54.42 CHRONIC MIDLINE LOW BACK PAIN WITH LEFT-SIDED SCIATICA: ICD-10-CM

## 2021-10-08 DIAGNOSIS — E55.9 VITAMIN D DEFICIENCY: ICD-10-CM

## 2021-10-08 DIAGNOSIS — E78.2 MIXED HYPERLIPIDEMIA: ICD-10-CM

## 2021-10-08 PROCEDURE — 99214 OFFICE O/P EST MOD 30 MIN: CPT | Performed by: PHYSICIAN ASSISTANT

## 2021-10-08 RX ORDER — CYCLOBENZAPRINE HCL 5 MG
5 TABLET ORAL 3 TIMES DAILY PRN
Qty: 30 TABLET | Refills: 0 | Status: SHIPPED | OUTPATIENT
Start: 2021-10-08 | End: 2022-03-25 | Stop reason: SDUPTHER

## 2021-10-08 ASSESSMENT — ENCOUNTER SYMPTOMS
GASTROINTESTINAL NEGATIVE: 1
MYALGIAS: 0
EYES NEGATIVE: 1
RESPIRATORY NEGATIVE: 1
FALLS: 0
PSYCHIATRIC NEGATIVE: 1
BACK PAIN: 1
CONSTITUTIONAL NEGATIVE: 1
NECK PAIN: 0
NEUROLOGICAL NEGATIVE: 1
CARDIOVASCULAR NEGATIVE: 1

## 2021-10-08 ASSESSMENT — VISUAL ACUITY: OU: 1

## 2021-10-08 ASSESSMENT — FIBROSIS 4 INDEX: FIB4 SCORE: 0.98
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
## 2021-10-20 DIAGNOSIS — F41.1 GENERALIZED ANXIETY DISORDER: ICD-10-CM

## 2021-10-20 NOTE — TELEPHONE ENCOUNTER
Pharmacy change    Was the patient seen in the last year in this department? Yes    Does patient have an active prescription for medications requested? Yes    Received Request Via: Pharmacy    Hospital Outpatient Visit on 09/24/2021   Component Date Value   • 25-Hydroxy   Vitamin D 25 09/24/2021 41    • Cholesterol,Tot 09/24/2021 186    • Triglycerides 09/24/2021 115    • HDL 09/24/2021 39*   • LDL 09/24/2021 124*   • Sodium 09/24/2021 137    • Potassium 09/24/2021 4.1    • Chloride 09/24/2021 101    • Co2 09/24/2021 23    • Anion Gap 09/24/2021 13.0    • Glucose 09/24/2021 123*   • Bun 09/24/2021 6*   • Creatinine 09/24/2021 0.82    • Calcium 09/24/2021 9.7    • AST(SGOT) 09/24/2021 23    • ALT(SGPT) 09/24/2021 19    • Alkaline Phosphatase 09/24/2021 48    • Total Bilirubin 09/24/2021 0.4    • Albumin 09/24/2021 4.7    • Total Protein 09/24/2021 7.5    • Globulin 09/24/2021 2.8    • A-G Ratio 09/24/2021 1.7    • GFR If  09/24/2021 >60    • GFR If Non  [employer]* 09/24/2021 >60    ]

## 2022-03-02 ENCOUNTER — OFFICE VISIT (OUTPATIENT)
Dept: MEDICAL GROUP | Facility: CLINIC | Age: 52
End: 2022-03-02
Payer: COMMERCIAL

## 2022-03-02 VITALS
WEIGHT: 220.2 LBS | HEIGHT: 71 IN | OXYGEN SATURATION: 97 % | SYSTOLIC BLOOD PRESSURE: 128 MMHG | TEMPERATURE: 98.3 F | BODY MASS INDEX: 30.83 KG/M2 | RESPIRATION RATE: 16 BRPM | HEART RATE: 57 BPM | DIASTOLIC BLOOD PRESSURE: 84 MMHG

## 2022-03-02 DIAGNOSIS — R09.81 CHRONIC NASAL CONGESTION: ICD-10-CM

## 2022-03-02 DIAGNOSIS — R04.0 BLOODY NOSE: ICD-10-CM

## 2022-03-02 DIAGNOSIS — F41.1 GENERALIZED ANXIETY DISORDER: ICD-10-CM

## 2022-03-02 DIAGNOSIS — G89.29 CHRONIC MIDLINE LOW BACK PAIN WITH LEFT-SIDED SCIATICA: ICD-10-CM

## 2022-03-02 DIAGNOSIS — M54.42 CHRONIC MIDLINE LOW BACK PAIN WITH LEFT-SIDED SCIATICA: ICD-10-CM

## 2022-03-02 DIAGNOSIS — J30.2 SEASONAL ALLERGIES: ICD-10-CM

## 2022-03-02 PROCEDURE — 99214 OFFICE O/P EST MOD 30 MIN: CPT | Performed by: PHYSICIAN ASSISTANT

## 2022-03-02 RX ORDER — TRIAMCINOLONE ACETONIDE 55 UG/1
2 SPRAY, METERED NASAL DAILY
Qty: 16.9 ML | Refills: 5 | Status: SHIPPED | OUTPATIENT
Start: 2022-03-02 | End: 2022-03-15 | Stop reason: SDUPTHER

## 2022-03-02 RX ORDER — FEXOFENADINE HCL 60 MG/1
60 TABLET, FILM COATED ORAL DAILY
Qty: 90 TABLET | Refills: 3 | Status: SHIPPED | OUTPATIENT
Start: 2022-03-02 | End: 2022-03-15 | Stop reason: SDUPTHER

## 2022-03-02 ASSESSMENT — ENCOUNTER SYMPTOMS
NERVOUS/ANXIOUS: 0
GASTROINTESTINAL NEGATIVE: 1
STRIDOR: 0
INSOMNIA: 0
MUSCULOSKELETAL NEGATIVE: 1
CARDIOVASCULAR NEGATIVE: 1
HALLUCINATIONS: 0
DEPRESSION: 1
RESPIRATORY NEGATIVE: 1
EYES NEGATIVE: 1
NEUROLOGICAL NEGATIVE: 1
CONSTITUTIONAL NEGATIVE: 1
MEMORY LOSS: 0
SINUS PAIN: 0
SORE THROAT: 0

## 2022-03-02 ASSESSMENT — FIBROSIS 4 INDEX: FIB4 SCORE: 1

## 2022-03-02 ASSESSMENT — VISUAL ACUITY: OU: 1

## 2022-03-02 ASSESSMENT — LIFESTYLE VARIABLES: SUBSTANCE_ABUSE: 0

## 2022-03-02 NOTE — PROGRESS NOTES
Subjective   Keenan Morgan is a 51 y.o. male who presents with Epistaxis (2 weeks/ 2-3 times day ) and Medication Refill (Sertraline )    1. Bloody nose  Patient states that he has been having 2-3 episodes of blood nose a day. States that this has been going on for the past two weeks. No trauma to the nose of face. He used a steamer a few nights ago and the next day did not have a bloody nose. Restart allergy medications. He has chronic nasal congestion and stopped using the allegra and Nasacort when he ran out months ago. Also recommend nasal saline spray multiple times a day to help relieve the dryness. If bleeding continues with these interventions or becomes severe will put in a referral to ENT for further evaluation and treatment options.     2. EVIE (generalized anxiety disorder)  Needs refill of sertraline. Last prescription was sent to the pharmacy with refill but had a note on it that he could have no refills without an appointment. [facility]. This si a chronic condition that is well controlled with this medication. He denies and new or worsening symptoms. Denies SI/HI.    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  No date: Hypertension      Comment:  history of; MD took pt off medication approx 2011  No date: Pain      Comment:  left hip  Past Surgical History:  12/2/2016: HIP ARTHROSCOPY; Left      Comment:  Procedure: HIP ARTHROSCOPY;  Surgeon: Forrest Gil M.D.;  Location: SURGERY [facility];  Service:   12/2/2016: FEMORAL NECK OSTEOPLASTY; Left      Comment:  Procedure: FEMORAL NECK OSTEOPLASTY;  Surgeon: Forrest Gil M.D.;  Location: SURGERY [facility];                 Service:   12/2/2016: ACETABULAR OSTEOPLASTY; Left      Comment:  Procedure: ACETABULAR OSTEOPLASTY, POSS PSOAS RELEASE                AND YOUNG;   Surgeon: Forrest Gil M.D.;  Location:                SURGERY [facility];  Service:   Social History    Tobacco Use      Smoking status: Never Smoker      Smokeless tobacco: Current User        Types: Snuff      Tobacco comment: 2 cans/week; used since age 6 years     Vaping Use      Vaping Use: Never used    Alcohol use: Not Currently      Comment: 6-8 beers per day, stopped alcohol 2011    Drug use: No    No family history on file.      Current Outpatient Medications: •  sertraline (ZOLOFT) 50 MG Tab, Take 2 Tablets by mouth every day., Disp: 180 Tablet, Rfl: 3•  cyclobenzaprine (FLEXERIL) 5 mg tablet, Take 1 Tablet by mouth 3 times a day as needed for Moderate Pain or Muscle Spasms., Disp: 30 Tablet, Rfl: 0•  ACETAMINOPHEN EXTRA STRENGTH 500 MG Tab, , Disp: , Rfl: •  hydrOXYzine pamoate (VISTARIL) 25 MG Cap, Take 25 mg by mouth 3 times a day as needed., Disp: , Rfl: •  vitamin D3 (CHOLECALCIFEROL) 1000 Unit (25 mcg) Tab, Take 2,000 Units by mouth every day., Disp: , Rfl: •  fexofenadine (ALLEGRA) 60 MG Tab, Take 1 Tablet by mouth every day., Disp: 90 Tablet, Rfl: 1    Patient was instructed on the use of medications, either prescriptions or OTC and informed on when the appropriate follow up time period should be. In addition, patient was also instructed that should any acute worsening occur that they should notify this clinic asap or call 911.      Review of Systems   Constitutional: Negative.    HENT: Positive for congestion and nosebleeds. Negative for ear discharge, ear pain, hearing loss, sinus pain, sore throat and tinnitus.    Eyes: Negative.    Respiratory: Negative.  Negative for stridor.    Cardiovascular: Negative.    Gastrointestinal: Negative.    Genitourinary: Negative.    Musculoskeletal: Negative.    Skin: Negative.    Neurological: Negative.    Endo/Heme/Allergies: Negative.    Psychiatric/Behavioral: Positive for depression. Negative for hallucinations, memory loss, substance abuse and  "suicidal ideas. The patient is not nervous/anxious and does not have insomnia.      Objective     /84 (BP Location: Right arm, Patient Position: Sitting, BP Cuff Size: Large adult)   Pulse (!) 57   Temp 36.8 °C (98.3 °F) (Temporal)   Resp 16   Ht 1.803 m (5' 11\")   Wt 99.9 kg (220 lb 3.2 oz)   SpO2 97%   BMI 30.71 kg/m²      Physical Exam  Vitals and nursing note reviewed.   Constitutional:       Appearance: Normal appearance. He is well-developed and well-groomed.   HENT:      Head: Normocephalic and atraumatic.      Nose: Congestion present.      Right Nostril: Epistaxis present.      Right Turbinates: Swollen and pale.      Left Turbinates: Swollen and pale.      Mouth/Throat:      Lips: Pink. No lesions.      Mouth: Mucous membranes are moist.   Eyes:      General: Lids are normal. Vision grossly intact. Gaze aligned appropriately.      Extraocular Movements: Extraocular movements intact.      Conjunctiva/sclera: Conjunctivae normal.      Pupils: Pupils are equal, round, and reactive to light.   Neck:      Thyroid: No thyromegaly.      Vascular: No carotid bruit or JVD.      Trachea: Trachea and phonation normal.   Cardiovascular:      Rate and Rhythm: Normal rate and regular rhythm.      Heart sounds: Normal heart sounds. No murmur heard.    No friction rub. No gallop.   Pulmonary:      Effort: Pulmonary effort is normal.      Breath sounds: Normal breath sounds. No wheezing, rhonchi or rales.   Musculoskeletal:         General: Normal range of motion.      Cervical back: Normal range of motion and neck supple.      Right lower leg: No edema.      Left lower leg: No edema.   Lymphadenopathy:      Cervical: No cervical adenopathy.   Skin:     General: Skin is warm and dry.      Capillary Refill: Capillary refill takes less than 2 seconds.      Findings: No lesion or rash.   Neurological:      Mental Status: He is alert and oriented to person, place, and time.      Cranial Nerves: Cranial nerves are " intact.   Psychiatric:         Attention and Perception: Attention and perception normal.         Mood and Affect: Mood and affect normal.         Speech: Speech normal.         Behavior: Behavior normal. Behavior is cooperative.         Thought Content: Thought content normal.         Judgment: Judgment normal.       Assessment & Plan      1. Bloody nose    2. EVIE (generalized anxiety disorder)

## 2022-03-15 DIAGNOSIS — R09.81 CHRONIC NASAL CONGESTION: ICD-10-CM

## 2022-03-15 DIAGNOSIS — R04.0 BLOODY NOSE: ICD-10-CM

## 2022-03-15 DIAGNOSIS — J30.2 SEASONAL ALLERGIES: ICD-10-CM

## 2022-03-15 DIAGNOSIS — F41.1 GENERALIZED ANXIETY DISORDER: ICD-10-CM

## 2022-03-15 RX ORDER — TRIAMCINOLONE ACETONIDE 55 UG/1
2 SPRAY, METERED NASAL DAILY
Qty: 16.9 ML | Refills: 5 | Status: SHIPPED | OUTPATIENT
Start: 2022-03-15 | End: 2022-03-25 | Stop reason: SDUPTHER

## 2022-03-15 RX ORDER — FEXOFENADINE HCL 60 MG/1
60 TABLET, FILM COATED ORAL DAILY
Qty: 90 TABLET | Refills: 3 | Status: SHIPPED | OUTPATIENT
Start: 2022-03-15 | End: 2022-03-16 | Stop reason: SDUPTHER

## 2022-03-15 NOTE — TELEPHONE ENCOUNTER
Received request via: Patient    Was the patient seen in the last year in this department? Yes    Does the patient have an active prescription (recently filled or refills available) for medication(s) requested? Yes - Needs to change pharmacies

## 2022-03-16 DIAGNOSIS — R09.81 CHRONIC NASAL CONGESTION: ICD-10-CM

## 2022-03-16 DIAGNOSIS — F41.1 GENERALIZED ANXIETY DISORDER: ICD-10-CM

## 2022-03-16 RX ORDER — FEXOFENADINE HCL 60 MG/1
60 TABLET, FILM COATED ORAL DAILY
Qty: 90 TABLET | Refills: 3 | Status: SHIPPED | OUTPATIENT
Start: 2022-03-16

## 2022-03-16 NOTE — TELEPHONE ENCOUNTER
Received request via: Patient    Was the patient seen in the last year in this department? Yes    Does the patient have an active prescription (recently filled or refills available) for medication(s) requested? No     The patient wants his prescriptions sent to [facility]

## 2022-03-17 ENCOUNTER — TELEPHONE (OUTPATIENT)
Dept: MEDICAL GROUP | Facility: CLINIC | Age: 52
End: 2022-03-17
Payer: COMMERCIAL

## 2022-03-18 NOTE — TELEPHONE ENCOUNTER
DOCUMENTATION OF PAR STATUS:    1. Name of Medication & Dose: Setaline HCI 50MG tablets     2. Name of Prescription Coverage Company & phone #: [employer]    3. Date Prior Auth Submitted: 3/17/22    4. What information was given to obtain insurance decision? Dx code, Provider information, Patient adress    5. Prior Auth Status? Pending    6. Patient Notified: N\A

## 2022-03-25 ENCOUNTER — OFFICE VISIT (OUTPATIENT)
Dept: PHYSICAL MEDICINE AND REHAB | Facility: MEDICAL CENTER | Age: 52
End: 2022-03-25
Payer: COMMERCIAL

## 2022-03-25 VITALS
DIASTOLIC BLOOD PRESSURE: 94 MMHG | WEIGHT: 216.93 LBS | HEIGHT: 71 IN | SYSTOLIC BLOOD PRESSURE: 144 MMHG | OXYGEN SATURATION: 98 % | TEMPERATURE: 97.2 F | HEART RATE: 70 BPM | BODY MASS INDEX: 30.37 KG/M2

## 2022-03-25 DIAGNOSIS — M67.952 TENDINOPATHY OF LEFT GLUTEUS MEDIUS: ICD-10-CM

## 2022-03-25 DIAGNOSIS — R09.81 CHRONIC NASAL CONGESTION: ICD-10-CM

## 2022-03-25 DIAGNOSIS — M54.50 CHRONIC LEFT-SIDED LOW BACK PAIN WITHOUT SCIATICA: ICD-10-CM

## 2022-03-25 DIAGNOSIS — J30.2 SEASONAL ALLERGIES: ICD-10-CM

## 2022-03-25 DIAGNOSIS — M47.816 LUMBAR SPONDYLOSIS: Primary | ICD-10-CM

## 2022-03-25 DIAGNOSIS — G89.29 CHRONIC MIDLINE LOW BACK PAIN WITH LEFT-SIDED SCIATICA: ICD-10-CM

## 2022-03-25 DIAGNOSIS — M54.42 CHRONIC MIDLINE LOW BACK PAIN WITH LEFT-SIDED SCIATICA: ICD-10-CM

## 2022-03-25 DIAGNOSIS — R04.0 BLOODY NOSE: ICD-10-CM

## 2022-03-25 DIAGNOSIS — G89.29 CHRONIC LEFT-SIDED LOW BACK PAIN WITHOUT SCIATICA: ICD-10-CM

## 2022-03-25 PROCEDURE — 99204 OFFICE O/P NEW MOD 45 MIN: CPT | Performed by: STUDENT IN AN ORGANIZED HEALTH CARE EDUCATION/TRAINING PROGRAM

## 2022-03-25 RX ORDER — CYCLOBENZAPRINE HCL 5 MG
5-10 TABLET ORAL 3 TIMES DAILY PRN
Qty: 90 TABLET | Refills: 2 | Status: SHIPPED | OUTPATIENT
Start: 2022-03-25 | End: 2022-11-29 | Stop reason: SDUPTHER

## 2022-03-25 RX ORDER — IBUPROFEN 600 MG/1
600 TABLET ORAL EVERY 6 HOURS PRN
Qty: 90 TABLET | Refills: 2 | Status: SHIPPED | OUTPATIENT
Start: 2022-03-25 | End: 2022-05-26 | Stop reason: SDUPTHER

## 2022-03-25 ASSESSMENT — PATIENT HEALTH QUESTIONNAIRE - PHQ9
CLINICAL INTERPRETATION OF PHQ2 SCORE: 0
5. POOR APPETITE OR OVEREATING: 0 - NOT AT ALL

## 2022-03-25 ASSESSMENT — PAIN SCALES - GENERAL: PAINLEVEL: 7=MODERATE-SEVERE PAIN

## 2022-03-25 ASSESSMENT — FIBROSIS 4 INDEX: FIB4 SCORE: 1

## 2022-03-25 NOTE — PATIENT INSTRUCTIONS
We discussed a medial branch block procedure. This procedure is a test. It involves numbing the nerves that carry the sensation of pain from joints with arthritis in your low back. The purpose of this procedure is to see if your pain is coming from these nerves. You will likely only have temporarily pain relief from this procedure. Again, this is meant to be a test, not a long-term treatment.    After this test, I will ask you how many hours you experienced pain relief for. If you experience significant pain relief for the anticipated length of time from the numbing medicine, then we will repeat this medial branch block procedure one more time at a separate visit. This second test procedure is required by insurance and also enables us to be doubly sure that we have correctly identified the painful nerves.    If you again experience significant pain relief for the anticipated length of time from the numbing medicine, then we will schedule you for a medial branch radiofrequency ablation procedure. This procedure is intended to provide longer-lasting relief. It involves ablating (burning) the same nerves that we previously numbed. By ablating (burning) these nerves, we expect at least a 50% chance of excellent pain relief lasting for 6-18 months. Over time, the nerves can regenerate which can cause the pain to return, but the radiofrequency ablation procedure can be repeated in the future if necessary.

## 2022-03-25 NOTE — TELEPHONE ENCOUNTER
Received request via: Patient    Was the patient seen in the last year in this department? Yes    Does the patient have an active prescription (recently filled or refills available) for medication(s) requested? No     Rx for Nasacort was sent 3/15/22 to [facility].  Patient requests that it be sent to [facility].

## 2022-03-25 NOTE — Clinical Note
Andrew Sam,  Thanks for referring Keenan Morgan to my office. My current impression is primarily lumbar facetogenic pain. I have ordered a lumbar XR and have scheduled him for diagnostic lumbar medial branch block injections with me. 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 Unfortunately he was unable to tolerate PT in the past so I have held off on a PT referral for now.  I also refilled his flexeril and ibuprofen since both were helping and he is running out. I advised him to trial flexeril 10mg instead of 5mg on his day off to see if this improves his pain further without excessive grogginess. I will see him 1 week after his injections with me.  Thanks and please let me know if you have any questions.  Lin Garza MD Interventional Pain Management [facility]
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
## 2022-03-26 ENCOUNTER — APPOINTMENT (OUTPATIENT)
Dept: RADIOLOGY | Facility: IMAGING CENTER | Age: 52
End: 2022-03-26
Attending: STUDENT IN AN ORGANIZED HEALTH CARE EDUCATION/TRAINING PROGRAM
Payer: COMMERCIAL

## 2022-03-26 ENCOUNTER — APPOINTMENT (OUTPATIENT)
Dept: URGENT CARE | Facility: PHYSICIAN GROUP | Age: 52
End: 2022-03-26
Payer: COMMERCIAL

## 2022-03-26 DIAGNOSIS — G89.29 CHRONIC LEFT-SIDED LOW BACK PAIN WITHOUT SCIATICA: ICD-10-CM

## 2022-03-26 DIAGNOSIS — M54.50 CHRONIC LEFT-SIDED LOW BACK PAIN WITHOUT SCIATICA: ICD-10-CM

## 2022-03-26 PROCEDURE — 72100 X-RAY EXAM L-S SPINE 2/3 VWS: CPT | Mod: TC,FY | Performed by: STUDENT IN AN ORGANIZED HEALTH CARE EDUCATION/TRAINING PROGRAM

## 2022-03-28 ENCOUNTER — HOSPITAL ENCOUNTER (OUTPATIENT)
Dept: RADIOLOGY | Facility: MEDICAL CENTER | Age: 52
End: 2022-03-28
Payer: COMMERCIAL

## 2022-03-28 RX ORDER — TRIAMCINOLONE ACETONIDE 55 UG/1
2 SPRAY, METERED NASAL DAILY
Qty: 16.9 ML | Refills: 3 | Status: SHIPPED | OUTPATIENT
Start: 2022-03-28 | End: 2023-07-25

## 2022-04-04 ENCOUNTER — TELEPHONE (OUTPATIENT)
Dept: PHYSICAL MEDICINE AND REHAB | Facility: MEDICAL CENTER | Age: 52
End: 2022-04-04
Payer: COMMERCIAL

## 2022-04-04 NOTE — TELEPHONE ENCOUNTER
Called pt/ LVM to try to schedule. Requested call back.    ----- Message from Maurice Hunt Ass't sent at 4/4/2022  7:09 AM PDT -----  Regarding: FW: Injections    ----- Message -----  From: Keenan Morgan  Sent: 4/3/2022  12:19 PM PDT  To: [facility]  Subject: Injections                                       Hi Dr gutierrez.   Will you have office call me to schedule. I been trying but the  can't seem to get me to the correct office.    Thanks  Carl Morgan

## 2022-04-11 ENCOUNTER — TELEPHONE (OUTPATIENT)
Dept: PHYSICAL MEDICINE AND REHAB | Facility: MEDICAL CENTER | Age: 52
End: 2022-04-11
Payer: COMMERCIAL

## 2022-04-12 NOTE — TELEPHONE ENCOUNTER
----- Message from Lin Garza M.D. sent at 4/11/2022  4:10 PM PDT -----  Regarding: FW: Injections  Sorry to hear that his pain worsened. It sounds similar to the episodes of pain he's had in the past. If he'd like to come in for further evaluation, we can schedule a follow up. Otherwise I would recommend keeping the appt for the diagnostic lumbar   medial branch block injections on 4/26 which will still give us good information.    ----- Message -----  From: Chinedu Madsen Med Ass't  Sent: 4/11/2022  10:23 AM PDT  To: Lin Garza M.D.  Subject: FW: Injections                                   Please see forwarded message. Thank you.    -Darrian    ----- Message -----  From: Marielle Wallace Med Ass't  Sent: 4/11/2022   7:18 AM PDT  To: Chinedu Madsen Med Ass't  Subject: FW: Injections                                     ----- Message -----  From: Keenan Morgan  Sent: 4/9/2022   4:32 PM PDT  To: [facility]  Subject: Injections                                       Dr. Garza    This is more than a pain problem. I bent down to pick a weed and laid on ground for who knows how long.  I couldn't get up.

## 2022-04-12 NOTE — TELEPHONE ENCOUNTER
Called pt to relay Dr. Garza's message. Requested call back if pt had questions or wanted a sooner FV.

## 2022-04-26 ENCOUNTER — HOSPITAL ENCOUNTER (OUTPATIENT)
Facility: REHABILITATION | Age: 52
End: 2022-04-26
Attending: STUDENT IN AN ORGANIZED HEALTH CARE EDUCATION/TRAINING PROGRAM | Admitting: STUDENT IN AN ORGANIZED HEALTH CARE EDUCATION/TRAINING PROGRAM
Payer: COMMERCIAL

## 2022-04-26 ENCOUNTER — APPOINTMENT (OUTPATIENT)
Dept: RADIOLOGY | Facility: REHABILITATION | Age: 52
End: 2022-04-26
Attending: STUDENT IN AN ORGANIZED HEALTH CARE EDUCATION/TRAINING PROGRAM
Payer: COMMERCIAL

## 2022-04-26 VITALS
HEART RATE: 65 BPM | DIASTOLIC BLOOD PRESSURE: 92 MMHG | BODY MASS INDEX: 30.25 KG/M2 | OXYGEN SATURATION: 98 % | SYSTOLIC BLOOD PRESSURE: 152 MMHG | RESPIRATION RATE: 16 BRPM | TEMPERATURE: 98.2 F | WEIGHT: 216.05 LBS | HEIGHT: 71 IN

## 2022-04-26 PROCEDURE — 64494 INJ PARAVERT F JNT L/S 2 LEV: CPT

## 2022-04-26 PROCEDURE — 64493 INJ PARAVERT F JNT L/S 1 LEV: CPT

## 2022-04-26 PROCEDURE — 700101 HCHG RX REV CODE 250

## 2022-04-26 PROCEDURE — 700117 HCHG RX CONTRAST REV CODE 255

## 2022-04-26 PROCEDURE — 700111 HCHG RX REV CODE 636 W/ 250 OVERRIDE (IP)

## 2022-04-26 RX ORDER — LIDOCAINE HYDROCHLORIDE 10 MG/ML
INJECTION, SOLUTION EPIDURAL; INFILTRATION; INTRACAUDAL; PERINEURAL
Status: COMPLETED
Start: 2022-04-26 | End: 2022-04-26

## 2022-04-26 RX ORDER — BUPIVACAINE HYDROCHLORIDE 5 MG/ML
INJECTION, SOLUTION EPIDURAL; INTRACAUDAL
Status: COMPLETED
Start: 2022-04-26 | End: 2022-04-26

## 2022-04-26 RX ADMIN — LIDOCAINE HYDROCHLORIDE 10 ML: 10 INJECTION, SOLUTION EPIDURAL; INFILTRATION; INTRACAUDAL; PERINEURAL at 15:16

## 2022-04-26 RX ADMIN — BUPIVACAINE HYDROCHLORIDE 5 ML: 5 INJECTION, SOLUTION EPIDURAL; INTRACAUDAL; PERINEURAL at 15:21

## 2022-04-26 RX ADMIN — IOHEXOL 3 ML: 240 INJECTION, SOLUTION INTRATHECAL; INTRAVASCULAR; INTRAVENOUS; ORAL at 15:21

## 2022-04-26 ASSESSMENT — FIBROSIS 4 INDEX: FIB4 SCORE: 1

## 2022-04-26 NOTE — H&P
Physical Medicine & Rehab History & Physical Note    Date  4/26/2022    Primary Care Physician  Cecy Brush P.A.-C.      Pre-Op Diagnosis Codes:     * Lumbar spondylosis [M47.816]    HPI  This is a 51 y.o. male who presented with nonradiating left low back pain. No new numbness, tingling, or weakness. Would like to proceed with diagnostic lumbar medial branch blocks targeting Left L4-L5 and L5-S1 facet joints.      Past Medical History:   Diagnosis Date   • Acute non-recurrent maxillary sinusitis 8/11/2020   • Femoroacetabular impingement of left hip 12/2/2016   • Hypertension     history of; MD took pt off medication approx 2011   • Pain     left hip       Past Surgical History:   Procedure Laterality Date   • HIP ARTHROSCOPY Left 12/2/2016    Procedure: HIP ARTHROSCOPY;  Surgeon: Forrest Gil M.D.;  Location: [facility];  Service:    • FEMORAL NECK OSTEOPLASTY Left 12/2/2016    Procedure: FEMORAL NECK OSTEOPLASTY;  Surgeon: Forrest Gil M.D.;  Location: [facility];  Service:    • ACETABULAR OSTEOPLASTY Left 12/2/2016    Procedure: ACETABULAR OSTEOPLASTY, POSS PSOAS RELEASE AND YOUNG;  Surgeon: Forrest Gil M.D.;  Location: [facility];  Service:        No current facility-administered medications for this encounter.       Social History     Socioeconomic History   • Marital status: Single     Spouse name: Not on file   • Number of children: Not on file   • Years of education: Not on file   • Highest education level: Not on file   Occupational History   • Not on file   Tobacco Use   • Smoking status: Never Smoker   • Smokeless tobacco: Current User     Types: Snuff   • Tobacco comment: 2 cans/week; used since age 6 years    Vaping Use   • Vaping Use: Never used   Substance and Sexual Activity   • Alcohol use: Not Currently     Comment: No alcohol since 2011   • Drug use: No   • Sexual activity: Yes   Other Topics Concern   • Not on file   Social  History Narrative   • Not on file     Social Determinants of Health     Financial Resource Strain: Not on file   Food Insecurity: Not on file   Transportation Needs: Not on file   Physical Activity: Not on file   Stress: Not on file   Social Connections: Not on file   Intimate Partner Violence: Not on file   Housing Stability: Not on file       Family History   Family history unknown: Yes       Allergies  Aspirin, Guaifenesin, Penicillin [penicillin g potassium], and Penicillins    Review of Systems  Negative except for HPi    Physical Exam  Gen: no acute distress  HEENT: NCAT, EOMI  Resp: breathing comfortably on RA  CV: Distal extremities warm and well perfused  Ab: nondistended  Neuro: A+O x 4  MSK: moving all extremities spontaneously    Vital Signs  Blood Pressure: 156/91   Temperature: 36.8 °C (98.3 °F)   Pulse: 75   Respiration: 18   Pulse Oximetry: 96 %       Labs:                    Radiology:  DX-PORTABLE FLUOROSCOPY < 1 HOUR    (Results Pending)         Assessment/Plan:  Pre-Op Diagnosis Codes:     * Lumbar spondylosis [M47.816]  Procedure(s):  Diagnostic medial branch blocks targeting the LEFT L4-5 and L5-S1 facet joints with fluoroscopic guidance  .     Ongoing nonradiating left low back pain. Would like to proceed with diagnostic lumbar medial branch blocks targeting Left L4-L5 and L5-S1 facet joints.      Lin Garza MD  Interventional Pain and Spine  Physical Medicine and Rehabilitation  [facility]

## 2022-04-26 NOTE — PROGRESS NOTES
1530: Rec'd pt from OR, report rec'd from Donna, procedure RN, pt ambulatory to chair, VSS, tolerating liquids with no nausea.  Pain assessed.  Dressing CDI.  Ice pack placed to incision site.  Dr. Garza in to see patient.  Patient ambulatory and meets D/C criteria.      Patient d/c'd to designated .

## 2022-04-26 NOTE — OP REPORT
"Date of Service: see epic time stamp for DOS    Patient: Keenan Morgan 51 y.o. male     MRN: [id number]     Physician/s: Lin Garza MD    Pre-operative Diagnosis: Lumbosacral spondylosis, facet arthropathy. The patient was NOT seen for lumbar radiculopathy today.     Post-operative Diagnosis: Lumbosacral spondylosis, facet arthropathy. The patient was NOT seen for lumbar radiculopathy today.     Procedure:  diagnostic lumbar medial branch blocks targeting the left L4-L5 and L5-S1 facet joints    Description of procedure:    The risks, benefits, and alternatives of the procedure were reviewed and discussed with the patient.  Written informed consent was freely obtained. A pre-procedural time-out was conducted by the physician verifying patient’s identity, procedure to be performed, procedure site and side, and allergy verification. Appropriate equipment was determined to be in place for the procedure.     The patient's vital signs were carefully monitored before, throughout, and after the procedure.     In the fluoroscopy suite the patient was placed in a prone position and the skin was prepped and draped in the usual sterile fashion. The fluoroscope was placed over the lower back at the appropriate angles, and the targets for injection were marked. A 27g needle was placed into each of the markings at the levels below, and approx 1cc of 1% Lidocaine was injected subcutaneously into the epidermal and dermal layers. The needle was removed intact.     Using an oblique view, A 22g 3.5\" needle was then placed at the intersection of the transverse process and superior articular process at the L4-5 facet joint where the L3 medial branch runs on the left side. The needle tips were then verified by AP, oblique, and lateral views.     Using an oblique view, A 22g 3.5\" needle was then placed at the intersection of the transverse process and superior articular process at the L5-S1 facet joint where the L4 medial " "branch runs on the left side. The needle tips were then verified by AP, oblique, and lateral views.     Using an oblique view, A 22g 3.5\" needle was then placed at the intersection of the transverse process and superior articular process at the S1 facet where the L5 dorsal ramus runs on the left side. The needle tips were then verified by AP, oblique, and lateral views.      In the AP view, less than 1 cc of contrast dye was used to highlight the medial branch while the fluoroscope was running live at the levels above. At the left S1 facet the contrast flow originally did not follow the path of the dorsal ramus branch at that level, and the needle was repositioned and subsequent injection of less than 1 cc of contrast dye highlighted the dorsal ramus with the fluoroscope running live. Following negative aspiration, approximately 0.5 ml. of  0.5% bupivacaine was then injected at the above levels, and the needles were removed intact after restyleted. The patient's back was covered with a 4x4 gauze, the area was cleansed with sterile normal saline, and a dressing was applied.       There were no complications noted, the patient was hemodynamically stable, and tolerated the procedure well.     Follow-up as scheduled    Lin Garza MD  Interventional Pain and Spine  Physical Medicine and Rehabilitation  [facility]      CPT  Intraarticular joint or medial branch block (MBB) - lumbar or sacral (1st level):  81322-as  Intraarticular joint or medial branch block (MBB) - lumbar or sacral (2nd level):  68819-mu      "

## 2022-04-26 NOTE — PROGRESS NOTES
1455:  Patient admitted to pre-op, health assessment complete, HX reviewed (Anxiety), current medications reviewed with patient, see MAR, pt denies taking blood thinners Ibuprofen) for  1 days, ok per Dr. Garza.  Patient has a ride post procedure.  Printed and verbal discharge instructions given with patient understanding, hand-off given to Donna GLASGOW.

## 2022-04-26 NOTE — PROGRESS NOTES
Handoff received from pre-procedure RN. Pre assessment complete with pertinent history reviewed (Anxiety).  Allergies reviewed, stop bang = 2.  Pt positioned prone by CST, RN, XRT, ankles resting on pillow, hands resting on stool under head of procedure table.

## 2022-04-28 ENCOUNTER — TELEPHONE (OUTPATIENT)
Dept: PHYSICAL MEDICINE AND REHAB | Facility: MEDICAL CENTER | Age: 52
End: 2022-04-28
Payer: COMMERCIAL

## 2022-04-29 NOTE — TELEPHONE ENCOUNTER
Called pt/ LM to follow-up post SP with Dr. Garza on 4/26. Requested return call if any questions or concerns.

## 2022-05-12 ENCOUNTER — OFFICE VISIT (OUTPATIENT)
Dept: PHYSICAL MEDICINE AND REHAB | Facility: MEDICAL CENTER | Age: 52
End: 2022-05-12
Payer: COMMERCIAL

## 2022-05-12 VITALS
SYSTOLIC BLOOD PRESSURE: 144 MMHG | DIASTOLIC BLOOD PRESSURE: 84 MMHG | HEIGHT: 71 IN | OXYGEN SATURATION: 99 % | HEART RATE: 64 BPM | BODY MASS INDEX: 30.25 KG/M2 | TEMPERATURE: 97.8 F | WEIGHT: 216.05 LBS

## 2022-05-12 DIAGNOSIS — M67.952 TENDINOPATHY OF LEFT GLUTEUS MEDIUS: ICD-10-CM

## 2022-05-12 DIAGNOSIS — G89.29 CHRONIC LEFT-SIDED LOW BACK PAIN WITHOUT SCIATICA: ICD-10-CM

## 2022-05-12 DIAGNOSIS — G89.29 CHRONIC MIDLINE LOW BACK PAIN WITH LEFT-SIDED SCIATICA: ICD-10-CM

## 2022-05-12 DIAGNOSIS — M54.42 CHRONIC MIDLINE LOW BACK PAIN WITH LEFT-SIDED SCIATICA: ICD-10-CM

## 2022-05-12 DIAGNOSIS — M54.50 CHRONIC LEFT-SIDED LOW BACK PAIN WITHOUT SCIATICA: ICD-10-CM

## 2022-05-12 DIAGNOSIS — M47.816 LUMBAR SPONDYLOSIS: Primary | ICD-10-CM

## 2022-05-12 PROCEDURE — 99214 OFFICE O/P EST MOD 30 MIN: CPT | Performed by: STUDENT IN AN ORGANIZED HEALTH CARE EDUCATION/TRAINING PROGRAM

## 2022-05-12 ASSESSMENT — FIBROSIS 4 INDEX: FIB4 SCORE: 1

## 2022-05-12 ASSESSMENT — PATIENT HEALTH QUESTIONNAIRE - PHQ9
CLINICAL INTERPRETATION OF PHQ2 SCORE: 0
5. POOR APPETITE OR OVEREATING: 0 - NOT AT ALL

## 2022-05-12 ASSESSMENT — PAIN SCALES - GENERAL: PAINLEVEL: 8=MODERATE-SEVERE PAIN

## 2022-05-12 NOTE — PATIENT INSTRUCTIONS
Your procedure will be at the [facility] special procedure suite.    [facility] [place] [address]       PRE-PROCEDURE INSTRUCTIONS  You may take your regular medications except:   No Anti-inflammatories 5 days prior to your procedure. Anti-inflammatories include medicines such as  ibuprofen (Motrin, Advil), Excedrin, Naproxen (Aleve, Anaprox, Naprelan, Naprosyn), Celecoxib (Celebrex), Diclofenac (Voltaren-XR tab), and Meloxicam (Mobic).   You can take the remainder of your pain medications as prescribed.   If you are having a diagnostic procedure such as a medial branch block, do not use her pain meds on the day of the procedure  No Glucophage or Metformin 24 hours before your procedure. You may resume next day after your procedure.  Call the physiatry office if you are taking or prescribed anti-biotics within five days of procedure.  Please ask provider if you are taking any new diabetes medication.  CONTINUE TAKING BLOOD PRESSURE MEDICATIONS AS PRESCRIBED.  Pain medications will not be prescribed on the procedure day. Procedural pain medication may be used by your provider   Call your doctor's office performing the procedure if you have a fever, chills, rash or new illness prior to your procedure    Anticoagulation/antiplatelet medications  No Blood thinning medications such as Coumadin, Xarelto, aspirin or Plavix 5 days prior to procedure unless your doctor said to continue these medications. Call your doctor if a new medication is prescribed in this class.     Restrictions for eating before procedure:   If you are getting procedural sedation, then do not eat to for 8 hours prior to procedure appointment time. Do not drink fluids for four hours prior to your procedure time.   If you are not having procedural sedation, then Skip the meal prior to your procedure. If you have a morning procedure then skip breakfast. If you have an afternoon procedure then skip lunch.   You may drink clear liquids  up to 2 hours prior to your procedure  You must have a  the day of procedure to accompany you home.      POST PROCEDURE INSTRUCTIONS   No heavy lifting, strenuous bending or strenuous exercise for 3 days after your procedure.  No hot tubs, baths, swimming for 3 days after your procedure  You can remove the bandage the day after the procedure.  IF YOU RECEIVED A DIAGNOSTIC PROCEDURE (SUCH AS A MEDIAL BRANCH BLOCK), PLEASE NOTE THAT WE DO EXPECT THIS INJECTION TO WEAR OFF.  IT IS IMPORTANT TO COMPLETE THE PAIN DIARY AND LIST THE PAIN SCORE ONLY FOR THE REGION WHERE THE PROCEDURE WAS AND BRING THIS TO YOUR FOLLOW UP VISIT.  IF YOU EXPERIENCE PROLONGED WEAKNESS LONGER THAN ONE DAY, BOWEL OR BLADDER INCONTINENCE THEN PLEASE CALL THE PHYSIATRY OFFICE.  Your leg may feel heavy, weak and numb for up to 1-2 days. Be very careful walking.    You may resume normal activities 3 days after procedure.
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
## 2022-05-19 ENCOUNTER — TELEPHONE (OUTPATIENT)
Dept: PHYSICAL MEDICINE AND REHAB | Facility: MEDICAL CENTER | Age: 52
End: 2022-05-19
Payer: COMMERCIAL

## 2022-05-19 NOTE — TELEPHONE ENCOUNTER
Called pt/LM to review pre-SP guidelines for upcoming SP with Dr. Garza on 5/24. Reviewed medication and dietary restrictions, need for  and early arrival. Requested call back if pt had any questions.

## 2022-05-24 ENCOUNTER — TELEPHONE (OUTPATIENT)
Dept: PHYSICAL MEDICINE AND REHAB | Facility: MEDICAL CENTER | Age: 52
End: 2022-05-24

## 2022-05-24 NOTE — TELEPHONE ENCOUNTER
Pt called stating that he could not get out of bed and wanted to cancel his SP with Dr. Garza today. Pt did not complain of any other specific issues.     His SP has been rescheduled to 6/7/22. We still need to reschedule another post-SP FV for the pt.

## 2022-05-26 RX ORDER — IBUPROFEN 600 MG/1
600 TABLET ORAL EVERY 6 HOURS PRN
Qty: 90 TABLET | Refills: 2 | Status: SHIPPED | OUTPATIENT
Start: 2022-05-26 | End: 2022-06-09 | Stop reason: SDUPTHER

## 2022-05-27 ENCOUNTER — PATIENT MESSAGE (OUTPATIENT)
Dept: PHYSICAL MEDICINE AND REHAB | Facility: MEDICAL CENTER | Age: 52
End: 2022-05-27

## 2022-06-07 ENCOUNTER — HOSPITAL ENCOUNTER (OUTPATIENT)
Facility: REHABILITATION | Age: 52
End: 2022-06-07
Attending: STUDENT IN AN ORGANIZED HEALTH CARE EDUCATION/TRAINING PROGRAM | Admitting: STUDENT IN AN ORGANIZED HEALTH CARE EDUCATION/TRAINING PROGRAM
Payer: COMMERCIAL

## 2022-06-07 ENCOUNTER — APPOINTMENT (OUTPATIENT)
Dept: RADIOLOGY | Facility: REHABILITATION | Age: 52
End: 2022-06-07
Attending: STUDENT IN AN ORGANIZED HEALTH CARE EDUCATION/TRAINING PROGRAM
Payer: COMMERCIAL

## 2022-06-07 VITALS
RESPIRATION RATE: 16 BRPM | WEIGHT: 211.64 LBS | DIASTOLIC BLOOD PRESSURE: 82 MMHG | BODY MASS INDEX: 29.63 KG/M2 | TEMPERATURE: 98.8 F | HEART RATE: 64 BPM | OXYGEN SATURATION: 97 % | SYSTOLIC BLOOD PRESSURE: 162 MMHG | HEIGHT: 71 IN

## 2022-06-07 PROCEDURE — 64493 INJ PARAVERT F JNT L/S 1 LEV: CPT

## 2022-06-07 PROCEDURE — 700111 HCHG RX REV CODE 636 W/ 250 OVERRIDE (IP)

## 2022-06-07 PROCEDURE — 700117 HCHG RX CONTRAST REV CODE 255

## 2022-06-07 PROCEDURE — 700101 HCHG RX REV CODE 250

## 2022-06-07 PROCEDURE — 64494 INJ PARAVERT F JNT L/S 2 LEV: CPT

## 2022-06-07 RX ORDER — BUPIVACAINE HYDROCHLORIDE 5 MG/ML
INJECTION, SOLUTION EPIDURAL; INTRACAUDAL
Status: COMPLETED
Start: 2022-06-07 | End: 2022-06-07

## 2022-06-07 RX ORDER — LIDOCAINE HYDROCHLORIDE 10 MG/ML
INJECTION, SOLUTION EPIDURAL; INFILTRATION; INTRACAUDAL; PERINEURAL
Status: COMPLETED
Start: 2022-06-07 | End: 2022-06-07

## 2022-06-07 RX ADMIN — BUPIVACAINE HYDROCHLORIDE 10 ML: 5 INJECTION, SOLUTION EPIDURAL; INTRACAUDAL; PERINEURAL at 15:10

## 2022-06-07 RX ADMIN — IOHEXOL 5 ML: 240 INJECTION, SOLUTION INTRATHECAL; INTRAVASCULAR; INTRAVENOUS; ORAL at 15:10

## 2022-06-07 RX ADMIN — LIDOCAINE HYDROCHLORIDE 10 ML: 10 INJECTION, SOLUTION EPIDURAL; INFILTRATION; INTRACAUDAL; PERINEURAL at 15:10

## 2022-06-07 ASSESSMENT — FIBROSIS 4 INDEX: FIB4 SCORE: 1

## 2022-06-07 ASSESSMENT — PAIN DESCRIPTION - PAIN TYPE: TYPE: CHRONIC PAIN

## 2022-06-07 NOTE — OP REPORT
"Date of Service: see epic time stamp for DOS    Patient: Keenan Morgan 51 y.o. male     MRN: [id number]     Physician/s: Lin Garza MD    Pre-operative Diagnosis: Lumbosacral spondylosis, facet arthropathy. The patient was NOT seen for lumbar radiculopathy today.     Post-operative Diagnosis: Lumbosacral spondylosis, facet arthropathy. The patient was NOT seen for lumbar radiculopathy today.     Procedure:  diagnostic lumbar medial branch blocks targeting the left L4-L5 and L5-S1 facet joints    Description of procedure:    The risks, benefits, and alternatives of the procedure were reviewed and discussed with the patient.  Written informed consent was freely obtained. A pre-procedural time-out was conducted by the physician verifying patient’s identity, procedure to be performed, procedure site and side, and allergy verification. Appropriate equipment was determined to be in place for the procedure.     The patient's vital signs were carefully monitored before, throughout, and after the procedure.     In the fluoroscopy suite the patient was placed in a prone position and the skin was prepped and draped in the usual sterile fashion. The fluoroscope was placed over the lower back at the appropriate angles, and the targets for injection were marked. A 27g needle was placed into each of the markings at the levels below, and approx 1cc of 1% Lidocaine was injected subcutaneously into the epidermal and dermal layers. The needle was removed intact.     Using an oblique view, A 25g 3.5\" needle was then placed at the intersection of the transverse process and superior articular process at the L4-5 facet joint where the L3 medial branch runs on the left side. The needle tips were then verified by AP, oblique, and lateral views.     Using an oblique view, A 25g 3.5\" needle was then placed at the intersection of the transverse process and superior articular process at the L5-S1 facet joint where the L4 medial " "branch runs on the left side. The needle tips were then verified by AP, oblique, and lateral views.     Using an oblique view, A 25g 3.5\" needle was then placed at the intersection of the transverse process and superior articular process at the S1 facet where the L5 dorsal ramus runs on the left side. The needle tips were then verified by AP, oblique, and lateral views.      In the AP view, less than 1 cc of contrast dye was used to highlight the medial branch while the fluoroscope was running live at the levels above. Following negative aspiration, approximately 0.5 ml. of  0.5% bupivacaine was then injected at the above levels, and the needles were removed intact after restyleted. The patient's back was covered with a 4x4 gauze, the area was cleansed with sterile normal saline, and a dressing was applied.       There were no complications noted, the patient was hemodynamically stable, and tolerated the procedure well.     Follow-up as scheduled    Lin Garza MD  Interventional Pain and Spine  Physical Medicine and Rehabilitation  [facility]      CPT  Intraarticular joint or medial branch block (MBB) - lumbar or sacral (1st level):  85353-wj  Intraarticular joint or medial branch block (MBB) - lumbar or sacral (2nd level):  [id number]    "

## 2022-06-07 NOTE — PROGRESS NOTES
Pt received to recovery area with report from procedure room RN Carlie.  VSS.  Ice compress applied to the affected area.  No swelling noted, dressing CDI.  Pt tolerates PO fluids and snack without difficulty.  Dr. Garza evaluated patient.  Meets criteria for discharge.  Pt ambulatory without difficulty.  Discharged to designated .

## 2022-06-07 NOTE — PROGRESS NOTES
1422 Pt admitted to pre-procedure area.  Procedure and site confirmed, consent signed.  VSS.  Medication allergies and current medications reviewed in Epic.  Designated  waiting in the car.  Printed discharge instructions reviewed and signed.  Pertinent medical information (Anxiety) reviewed in Epic and communicated to procedure RN.  Pt denies taking any NSAIDS/blood-thinners/anti-coagulants in the last 5 days.

## 2022-06-07 NOTE — PROGRESS NOTES
Received report from pre-procedure RN..  Pre-assessment completed.  Blood pressure and pulse ox connected to pt by surg tech and RN.  Pt hx reviewed (HTN).  Pt identified and time out performed with team agreeable.

## 2022-06-07 NOTE — INTERVAL H&P NOTE
H&P reviewed. The patient was examined and there are no changes to the H&P    Lin Garza MD  Interventional Pain and Spine  Physical Medicine and Rehabilitation  [facility]

## 2022-06-07 NOTE — PROGRESS NOTES
Pt received to recovery area with report from procedure room RN's Jasmin.  VSS.  Ice compress applied to the affected area.  No swelling noted, dressing CDI.  Pt tolerates PO fluids and snack without difficulty.  Dr. Garza evaluated patient.  Meets criteria for discharge.  Pt ambulatory without difficulty.  Discharged to designated .

## 2022-06-09 ENCOUNTER — TELEPHONE (OUTPATIENT)
Dept: PHYSICAL MEDICINE AND REHAB | Facility: MEDICAL CENTER | Age: 52
End: 2022-06-09
Payer: COMMERCIAL

## 2022-06-09 RX ORDER — IBUPROFEN 600 MG/1
600 TABLET ORAL EVERY 6 HOURS PRN
Qty: 90 TABLET | Refills: 2 | Status: SHIPPED | OUTPATIENT
Start: 2022-06-09 | End: 2022-07-21 | Stop reason: SDUPTHER

## 2022-06-09 NOTE — TELEPHONE ENCOUNTER
Called pt in effort to schedule next appt as requested in pt mychart message. Requested call back when convenient.

## 2022-06-09 NOTE — TELEPHONE ENCOUNTER
Called pt to check in post-SP with Dr. Garza on 6/7/22. LM requesting call back if pt had any questions or concerns.    No FV scheduled with Dr. Garza yet. Requested call back to arrange FV.
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
## 2022-06-14 ENCOUNTER — TELEMEDICINE (OUTPATIENT)
Dept: PHYSICAL MEDICINE AND REHAB | Facility: MEDICAL CENTER | Age: 52
End: 2022-06-14
Payer: COMMERCIAL

## 2022-06-14 VITALS — WEIGHT: 211.64 LBS | HEIGHT: 71 IN | BODY MASS INDEX: 29.63 KG/M2

## 2022-06-14 DIAGNOSIS — M54.42 CHRONIC MIDLINE LOW BACK PAIN WITH LEFT-SIDED SCIATICA: ICD-10-CM

## 2022-06-14 DIAGNOSIS — M47.816 LUMBAR SPONDYLOSIS: Primary | ICD-10-CM

## 2022-06-14 DIAGNOSIS — G89.29 CHRONIC MIDLINE LOW BACK PAIN WITH LEFT-SIDED SCIATICA: ICD-10-CM

## 2022-06-14 DIAGNOSIS — M54.50 CHRONIC LEFT-SIDED LOW BACK PAIN WITHOUT SCIATICA: ICD-10-CM

## 2022-06-14 DIAGNOSIS — M67.952 TENDINOPATHY OF LEFT GLUTEUS MEDIUS: ICD-10-CM

## 2022-06-14 DIAGNOSIS — G89.29 CHRONIC LEFT-SIDED LOW BACK PAIN WITHOUT SCIATICA: ICD-10-CM

## 2022-06-14 PROCEDURE — 99214 OFFICE O/P EST MOD 30 MIN: CPT | Mod: 95 | Performed by: STUDENT IN AN ORGANIZED HEALTH CARE EDUCATION/TRAINING PROGRAM

## 2022-06-14 ASSESSMENT — PAIN SCALES - GENERAL: PAINLEVEL: 6=MODERATE PAIN

## 2022-06-14 ASSESSMENT — FIBROSIS 4 INDEX: FIB4 SCORE: 1

## 2022-06-14 ASSESSMENT — PATIENT HEALTH QUESTIONNAIRE - PHQ9: CLINICAL INTERPRETATION OF PHQ2 SCORE: 0

## 2022-07-12 ENCOUNTER — HOSPITAL ENCOUNTER (OUTPATIENT)
Facility: REHABILITATION | Age: 52
End: 2022-07-12
Attending: STUDENT IN AN ORGANIZED HEALTH CARE EDUCATION/TRAINING PROGRAM | Admitting: STUDENT IN AN ORGANIZED HEALTH CARE EDUCATION/TRAINING PROGRAM
Payer: COMMERCIAL

## 2022-07-12 ENCOUNTER — APPOINTMENT (OUTPATIENT)
Dept: RADIOLOGY | Facility: REHABILITATION | Age: 52
End: 2022-07-12
Attending: STUDENT IN AN ORGANIZED HEALTH CARE EDUCATION/TRAINING PROGRAM
Payer: COMMERCIAL

## 2022-07-12 VITALS
HEART RATE: 66 BPM | TEMPERATURE: 98.4 F | DIASTOLIC BLOOD PRESSURE: 85 MMHG | OXYGEN SATURATION: 99 % | BODY MASS INDEX: 29.52 KG/M2 | RESPIRATION RATE: 16 BRPM | HEIGHT: 71 IN | SYSTOLIC BLOOD PRESSURE: 156 MMHG

## 2022-07-12 PROCEDURE — 99152 MOD SED SAME PHYS/QHP 5/>YRS: CPT

## 2022-07-12 PROCEDURE — 700111 HCHG RX REV CODE 636 W/ 250 OVERRIDE (IP)

## 2022-07-12 PROCEDURE — 64636 DESTROY L/S FACET JNT ADDL: CPT

## 2022-07-12 PROCEDURE — 64635 DESTROY LUMB/SAC FACET JNT: CPT

## 2022-07-12 RX ORDER — LIDOCAINE HYDROCHLORIDE 10 MG/ML
INJECTION, SOLUTION EPIDURAL; INFILTRATION; INTRACAUDAL; PERINEURAL
Status: COMPLETED
Start: 2022-07-12 | End: 2022-07-12

## 2022-07-12 RX ORDER — MIDAZOLAM HYDROCHLORIDE 1 MG/ML
INJECTION INTRAMUSCULAR; INTRAVENOUS
Status: COMPLETED
Start: 2022-07-12 | End: 2022-07-12

## 2022-07-12 RX ADMIN — LIDOCAINE HYDROCHLORIDE 10 ML: 10 INJECTION, SOLUTION EPIDURAL; INFILTRATION; INTRACAUDAL; PERINEURAL at 15:30

## 2022-07-12 RX ADMIN — FENTANYL CITRATE 25 MCG: 50 INJECTION, SOLUTION INTRAMUSCULAR; INTRAVENOUS at 15:22

## 2022-07-12 RX ADMIN — LIDOCAINE HYDROCHLORIDE 10 ML: 10 INJECTION, SOLUTION EPIDURAL; INFILTRATION; INTRACAUDAL; PERINEURAL at 15:15

## 2022-07-12 RX ADMIN — FENTANYL CITRATE 50 MCG: 50 INJECTION, SOLUTION INTRAMUSCULAR; INTRAVENOUS at 15:14

## 2022-07-12 RX ADMIN — MIDAZOLAM HYDROCHLORIDE 1 MG: 1 INJECTION, SOLUTION INTRAMUSCULAR; INTRAVENOUS at 15:14

## 2022-07-12 ASSESSMENT — PAIN DESCRIPTION - PAIN TYPE: TYPE: CHRONIC PAIN

## 2022-07-12 NOTE — OP REPORT
Patient: Keenan Morgan 51 y.o. male MRN: [id number]     Date of Service: 7/12/2022     Physician/s: Lin Garza MD    Pre-operative Diagnosis: Lumbar spondylosis, facet arthropathy.      Post-operative Diagnosis: Lumbar spondylosis, facet arthropathy.     Procedure: Medial Branch Radiofrequency neurotomy targeting the left L4-L5 and L5-S1 facet joint(s) with sedation.     Description of procedure:    The patient was not treated for radiculopathy at this time    The risks, benefits, and alternatives of the procedure were reviewed and discussed with the patient.  Written informed consent was freely obtained. A pre-procedural time-out was conducted by the physician verifying patient’s identity, procedure to be performed, procedure site and side, and allergy verification. Appropriate equipment was determined to be in place for the procedure.     Moderation sedation was achieved with Versed (1mg) and Fentanyl (75mcg). Monitoring of the patients vital signs and respiratory status was provided by trained independent registered nurse during the entire course of the procedures and under my supervision and recorded in the patient’s medical record. The duration of sedation was over 10 minutes.     The patient's vital signs were carefully monitored before, throughout, and after the procedure.     In the fluoroscopy suite the patient was placed in a prone position, and a pillow was placed underneath the level of the umbilicus. The skin was prepped and draped in the usual sterile fashion. The fluoroscope was placed over the low back at the appropriate angles, and the targets for needle/probe placement were marked. A 25g needle was placed into each of the markings at three levels, and approx 1cc of 1% Lidocaine was injected subcutaneously into the epidermal and dermal layers. The needle was removed.     A 18 gauge, 10 cm RFN cannula with a 10 mm active tip was then placed into the skin using fluoroscopic guidance and  advanced with an oblique view towards the intersection of the transverse process and superior articular process L4-5 facet joint where the L3 medial branch runs on the left side. The needle/probe tips were then verified by AP, oblique, and lateral views.     A 18 gauge, 10 cm RFN cannula with a 10 mm active tip was then placed into the skin using fluoroscopic guidance and advanced with an oblique view towards the intersection of the transverse process and superior articular process L5-S1 facet joint where the L4 medial branch runs on the left side. The needle/probe tips were then verified by AP, oblique, and lateral views.     A 18 gauge, 10 cm RFN cannula with a 10 mm active tip was then placed into the skin using fluoroscopic guidance and advanced with an oblique view towards the intersection of the transverse process and superior articular process S1 facet where the L5 dorsal ramus runs on the left side. The needle/probe tips were then verified by AP, oblique, and lateral views.     Motor stimulation is used as an extra precaution to ensure the needle tips are off the lumbar nerve roots prior to each lesion. Following negative aspiration, 1cc of 3% lidocaine was injected at each of the above locations. The needles are not moved, but fluoroscope guidance is used to ensure the needles have not moved. After a wait period of approximately 2 minutes, a radiofrequency lesion was then created at each level with a temperature of 80 degrees centigrade for 90 seconds.     The probes were adjusted to a 2nd location and images were saved in 2+ views. Motor testing was done which confirmed no twitching in the leg and a 2nd radiofrequency lesion was made with 80°C for 90 seconds.      The cannulas were restyletted, and were then removed intact.     Fluoroscopic images in AP and lateral view were saved prior to each radiofrequency neurotomy.    The patient's back was covered with a 4x4 gauze, the area was cleansed with sterile  normal saline, and a dressing was applied. There were no complications noted, the patient remained hemodynamically stable, and the patient tolerated the procedure well. The patient was examined in the postoperative area and the strength exam was identical as prior to the procedure.    Follow-up as scheduled    Lin Garza MD  Interventional Pain and Spine  Physical Medicine and Rehabilitation  [facility]        CPT  Radiofrequency ablation (RFA) - lumbar or sacral (1st joint):  16907  Radiofrequency ablation (RFA) - lumbar or sacral (each additional joint):  10039  moderate procedural sedation first 15 minutes: 56164  moderate procedrual sedation, additional 15 minutes: 29475

## 2022-07-12 NOTE — INTERVAL H&P NOTE
H&P reviewed. The patient was examined and there are no changes to the H&P    Lin Garza MD  Interventional Pain and Spine  Physical Medicine and Rehabilitation  [facility]

## 2022-07-12 NOTE — PROGRESS NOTES
1435 Pt received to pre procedure area. ID band and allergies verified. Vital signs taken and stable. Pertinent medical history (HTN) reviewed and communicated to procedure RN. Verified that patient has not taken NSAIDS, anticoagulants or blood thinners in past 5 days. Reviewed post op instructions with patient, questions answered, verbalized understanding.     1540  Pt received to recovery area, report received from procedure RN Carlie . Vitals taken and stable. Patient tolerated po fluids and snack without difficulty. Dressing clean, dry and intact. Pt declined ice pack.     1602  Pt seen by Dr. Garza post procedure, orders received for discharge. Patient ambulatory without difficulty. Pt discharged to designated .

## 2022-07-14 ENCOUNTER — PATIENT MESSAGE (OUTPATIENT)
Dept: PHYSICAL MEDICINE AND REHAB | Facility: MEDICAL CENTER | Age: 52
End: 2022-07-14
Payer: COMMERCIAL

## 2022-07-14 NOTE — TELEPHONE ENCOUNTER
Pt reports lot more soreness than the first 2 SP, mostly due to tender injection sites. Well-managed with ibuprofen and regular icing. No bowel or bladder issues. Reports Stabbing pain on inner left thigh on Tues following SP, but pain faded that evening.

## 2022-07-14 NOTE — TELEPHONE ENCOUNTER
Pt called to request a letter indicating what restrictions he should follow once returning to work following SP on Tuesday 7/12. He said that a return date was not required as his boss has already told him to take a few days off. Pt requests letter be completed as soon as possible.

## 2022-07-14 NOTE — TELEPHONE ENCOUNTER
"Carl Lambert. Regarding your SP on Tuesday, Dr. Garza replied:    \"I recommend continuing with ibuprofen and icing. The soreness is likely due to the ablation and the larger probes (instead of needles) that were used, but the soreness should subside typically within 2 weeks. Sometimes it can take up to 6 weeks for it to subside.\"  "

## 2022-07-14 NOTE — TELEPHONE ENCOUNTER
Thanks I recommend continuing with ibuprofen and icing. The soreness is likely due to the ablation and the larger probes (instead of needles) that were used, but the soreness should subside typically within 2 weeks. Sometimes it can take up to 6 weeks for it to subside.

## 2022-07-21 ENCOUNTER — PATIENT MESSAGE (OUTPATIENT)
Dept: PHYSICAL MEDICINE AND REHAB | Facility: MEDICAL CENTER | Age: 52
End: 2022-07-21

## 2022-07-21 ENCOUNTER — TELEMEDICINE (OUTPATIENT)
Dept: PHYSICAL MEDICINE AND REHAB | Facility: MEDICAL CENTER | Age: 52
End: 2022-07-21
Payer: COMMERCIAL

## 2022-07-21 VITALS
BODY MASS INDEX: 29.54 KG/M2 | HEIGHT: 71 IN | DIASTOLIC BLOOD PRESSURE: 88 MMHG | WEIGHT: 211 LBS | SYSTOLIC BLOOD PRESSURE: 138 MMHG

## 2022-07-21 DIAGNOSIS — M47.816 LUMBAR SPONDYLOSIS: ICD-10-CM

## 2022-07-21 DIAGNOSIS — M54.50 CHRONIC LEFT-SIDED LOW BACK PAIN WITHOUT SCIATICA: ICD-10-CM

## 2022-07-21 DIAGNOSIS — G89.29 CHRONIC MIDLINE LOW BACK PAIN WITH LEFT-SIDED SCIATICA: ICD-10-CM

## 2022-07-21 DIAGNOSIS — M54.42 CHRONIC MIDLINE LOW BACK PAIN WITH LEFT-SIDED SCIATICA: ICD-10-CM

## 2022-07-21 DIAGNOSIS — G89.29 CHRONIC LEFT-SIDED LOW BACK PAIN WITHOUT SCIATICA: ICD-10-CM

## 2022-07-21 DIAGNOSIS — M79.10 MYALGIA: ICD-10-CM

## 2022-07-21 DIAGNOSIS — M67.952 TENDINOPATHY OF LEFT GLUTEUS MEDIUS: ICD-10-CM

## 2022-07-21 PROCEDURE — 99214 OFFICE O/P EST MOD 30 MIN: CPT | Mod: 95,24 | Performed by: STUDENT IN AN ORGANIZED HEALTH CARE EDUCATION/TRAINING PROGRAM

## 2022-07-21 RX ORDER — IBUPROFEN 600 MG/1
600 TABLET ORAL EVERY 6 HOURS PRN
Qty: 270 TABLET | Refills: 2 | Status: SHIPPED | OUTPATIENT
Start: 2022-07-21 | End: 2022-10-07 | Stop reason: SDUPTHER

## 2022-07-21 ASSESSMENT — FIBROSIS 4 INDEX: FIB4 SCORE: 1

## 2022-07-21 ASSESSMENT — PATIENT HEALTH QUESTIONNAIRE - PHQ9: CLINICAL INTERPRETATION OF PHQ2 SCORE: 0

## 2022-07-21 ASSESSMENT — PAIN SCALES - GENERAL: PAINLEVEL: NO PAIN

## 2022-07-21 NOTE — LETTER
July 21, 2022    To Whom It May Concern:         This is confirmation that Keenan Morgan attended his scheduled appointment with Lin Garza M.D. on 7/21/22. It is my professional opinion that he may return to work on Monday 7/24.         If you have any questions please do not hesitate to call me at the phone number listed below.    Sincerely,          Lin Garza M.D.  [phone]
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
## 2022-10-07 ENCOUNTER — OFFICE VISIT (OUTPATIENT)
Dept: PHYSICAL MEDICINE AND REHAB | Facility: MEDICAL CENTER | Age: 52
End: 2022-10-07
Payer: COMMERCIAL

## 2022-10-07 VITALS
HEART RATE: 70 BPM | DIASTOLIC BLOOD PRESSURE: 100 MMHG | BODY MASS INDEX: 29.41 KG/M2 | WEIGHT: 210.1 LBS | SYSTOLIC BLOOD PRESSURE: 140 MMHG | HEIGHT: 71 IN | OXYGEN SATURATION: 99 % | TEMPERATURE: 97.9 F

## 2022-10-07 DIAGNOSIS — G89.29 CHRONIC MIDLINE LOW BACK PAIN WITH LEFT-SIDED SCIATICA: Primary | ICD-10-CM

## 2022-10-07 DIAGNOSIS — R20.0 NUMBNESS AND TINGLING OF LEFT LEG: ICD-10-CM

## 2022-10-07 DIAGNOSIS — R20.2 NUMBNESS AND TINGLING OF LEFT LEG: ICD-10-CM

## 2022-10-07 DIAGNOSIS — M54.50 CHRONIC LEFT-SIDED LOW BACK PAIN WITHOUT SCIATICA: ICD-10-CM

## 2022-10-07 DIAGNOSIS — G89.29 CHRONIC LEFT-SIDED LOW BACK PAIN WITHOUT SCIATICA: ICD-10-CM

## 2022-10-07 DIAGNOSIS — M54.42 CHRONIC MIDLINE LOW BACK PAIN WITH LEFT-SIDED SCIATICA: Primary | ICD-10-CM

## 2022-10-07 DIAGNOSIS — M79.10 MYALGIA: ICD-10-CM

## 2022-10-07 PROCEDURE — 20553 NJX 1/MLT TRIGGER POINTS 3/>: CPT | Performed by: STUDENT IN AN ORGANIZED HEALTH CARE EDUCATION/TRAINING PROGRAM

## 2022-10-07 PROCEDURE — 76942 ECHO GUIDE FOR BIOPSY: CPT | Performed by: STUDENT IN AN ORGANIZED HEALTH CARE EDUCATION/TRAINING PROGRAM

## 2022-10-07 PROCEDURE — 99214 OFFICE O/P EST MOD 30 MIN: CPT | Mod: 25 | Performed by: STUDENT IN AN ORGANIZED HEALTH CARE EDUCATION/TRAINING PROGRAM

## 2022-10-07 RX ORDER — IBUPROFEN 600 MG/1
600 TABLET ORAL EVERY 6 HOURS PRN
Qty: 270 TABLET | Refills: 2 | Status: SHIPPED | OUTPATIENT
Start: 2022-10-07 | End: 2023-06-01 | Stop reason: SDUPTHER

## 2022-10-07 ASSESSMENT — PATIENT HEALTH QUESTIONNAIRE - PHQ9: CLINICAL INTERPRETATION OF PHQ2 SCORE: 0

## 2022-10-07 ASSESSMENT — PAIN SCALES - GENERAL: PAINLEVEL: 4=SLIGHT-MODERATE PAIN
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
## 2022-10-07 NOTE — PROCEDURES
Patient Name: Keenan Morgan  : 1970  Date of Service: 10/7/2022    Physician/s: Lin Garza MD    Pre-operative Diagnosis: Myalgia (M79.1)    Post-operative Diagnosis: Myalgia (M79.1)    Procedure: trigger point injections of the following muscles:    Site R L   Splenius capitis     Splenius cervicis     Sternocleidomastoid     Rhomboids     Levator scapulae     Pectoralis minor     Pectoralis major     Serratus anterior     Teres major/minor     Quadratus lumborum     Paravertebral, cervical     Paravertebral, thoracic x x   Paravertebral, lumbar     Gluteus libra     Gluteus medius     Gluteus minimus     Tensor fascia tigre     Vastus lateralis     Adductor tor     Adductor longus     Occipitalis     Cervical paraspinal     Trapezius, upper x x   Trapezius, mid     Trapezius, lower     Latissimus dorsi       Description of procedure:    The risks, benefits, and alternatives of the procedure were reviewed and discussed with the patient.  Written informed consent was freely obtained. A pre-procedural time-out was conducted by the physician verifying patient’s identity, procedure to be performed, procedure site and side, and allergy verification. Appropriate equipment was determined to be in place for the procedure.     In the office suite exam room the patient was placed in a prone position and the skin areas for injection over the above muscles were marked. A total of 6 areas of pain were identified for injection. The areas of pain were then prepped and draped in the usual sterile fashion. A solution was prepared with 10 mL of 1% lidocaine. Ultrasound was confirmed to view the adjacent structures for blood vessels and nerves and to confirm the needle path was not within the structures. A 27g needle was placed into each of the markings at the areas above under ultrasound guidance with an out of plane approach.. After negative aspiration, approximately 1-1.5 mL of the above solution was  injected. The needle was removed intact after each trigger point injection, and the patient's back was covered with a 4x4 gauze, the area was cleansed with sterile normal saline, and a dressing was applied. There were no complications noted. The images were uploaded to our media tab for permanent storage.    Lin Garza MD  Interventional Pain and Spine  Physical Medicine and Rehabilitation  [facility]

## 2022-11-28 ENCOUNTER — PATIENT MESSAGE (OUTPATIENT)
Dept: PHYSICAL MEDICINE AND REHAB | Facility: MEDICAL CENTER | Age: 52
End: 2022-11-28
Payer: COMMERCIAL

## 2022-11-29 DIAGNOSIS — G89.29 CHRONIC MIDLINE LOW BACK PAIN WITH LEFT-SIDED SCIATICA: ICD-10-CM

## 2022-11-29 DIAGNOSIS — M54.42 CHRONIC MIDLINE LOW BACK PAIN WITH LEFT-SIDED SCIATICA: ICD-10-CM

## 2022-11-29 RX ORDER — CYCLOBENZAPRINE HCL 5 MG
5-10 TABLET ORAL 3 TIMES DAILY PRN
Qty: 270 TABLET | Refills: 2 | Status: SHIPPED | OUTPATIENT
Start: 2022-11-29 | End: 2023-06-01 | Stop reason: SDUPTHER

## 2023-01-06 ENCOUNTER — APPOINTMENT (OUTPATIENT)
Dept: PHYSICAL MEDICINE AND REHAB | Facility: MEDICAL CENTER | Age: 53
End: 2023-01-06
Payer: COMMERCIAL

## 2023-06-01 DIAGNOSIS — G89.29 CHRONIC MIDLINE LOW BACK PAIN WITH LEFT-SIDED SCIATICA: ICD-10-CM

## 2023-06-01 DIAGNOSIS — M54.42 CHRONIC MIDLINE LOW BACK PAIN WITH LEFT-SIDED SCIATICA: ICD-10-CM

## 2023-06-01 RX ORDER — IBUPROFEN 600 MG/1
600 TABLET ORAL EVERY 6 HOURS PRN
Qty: 270 TABLET | Refills: 0 | Status: SHIPPED | OUTPATIENT
Start: 2023-06-01

## 2023-06-01 RX ORDER — CYCLOBENZAPRINE HCL 5 MG
5-10 TABLET ORAL 3 TIMES DAILY PRN
Qty: 270 TABLET | Refills: 0 | Status: SHIPPED | OUTPATIENT
Start: 2023-06-01 | End: 2023-12-28 | Stop reason: SDUPTHER

## 2023-06-07 ENCOUNTER — OFFICE VISIT (OUTPATIENT)
Dept: PHYSICAL MEDICINE AND REHAB | Facility: MEDICAL CENTER | Age: 53
End: 2023-06-07
Payer: COMMERCIAL

## 2023-06-07 VITALS
BODY MASS INDEX: 28.83 KG/M2 | OXYGEN SATURATION: 97 % | HEIGHT: 71 IN | HEART RATE: 74 BPM | WEIGHT: 205.91 LBS | DIASTOLIC BLOOD PRESSURE: 98 MMHG | SYSTOLIC BLOOD PRESSURE: 160 MMHG | TEMPERATURE: 98 F

## 2023-06-07 DIAGNOSIS — G89.29 CHRONIC LEFT-SIDED LOW BACK PAIN WITHOUT SCIATICA: ICD-10-CM

## 2023-06-07 DIAGNOSIS — M54.50 CHRONIC LEFT-SIDED LOW BACK PAIN WITHOUT SCIATICA: ICD-10-CM

## 2023-06-07 DIAGNOSIS — R20.0 NUMBNESS AND TINGLING OF LEFT LEG: ICD-10-CM

## 2023-06-07 DIAGNOSIS — R20.2 NUMBNESS AND TINGLING OF LEFT LEG: ICD-10-CM

## 2023-06-07 DIAGNOSIS — M54.42 CHRONIC MIDLINE LOW BACK PAIN WITH LEFT-SIDED SCIATICA: ICD-10-CM

## 2023-06-07 DIAGNOSIS — M79.10 MYALGIA: ICD-10-CM

## 2023-06-07 DIAGNOSIS — G89.29 CHRONIC MIDLINE LOW BACK PAIN WITH LEFT-SIDED SCIATICA: ICD-10-CM

## 2023-06-07 DIAGNOSIS — M67.952 TENDINOPATHY OF LEFT GLUTEUS MEDIUS: ICD-10-CM

## 2023-06-07 DIAGNOSIS — M47.816 LUMBAR SPONDYLOSIS: ICD-10-CM

## 2023-06-07 PROCEDURE — 3080F DIAST BP >= 90 MM HG: CPT | Performed by: STUDENT IN AN ORGANIZED HEALTH CARE EDUCATION/TRAINING PROGRAM

## 2023-06-07 PROCEDURE — 99214 OFFICE O/P EST MOD 30 MIN: CPT | Mod: 25 | Performed by: STUDENT IN AN ORGANIZED HEALTH CARE EDUCATION/TRAINING PROGRAM

## 2023-06-07 PROCEDURE — 3077F SYST BP >= 140 MM HG: CPT | Performed by: STUDENT IN AN ORGANIZED HEALTH CARE EDUCATION/TRAINING PROGRAM

## 2023-06-07 PROCEDURE — 20553 NJX 1/MLT TRIGGER POINTS 3/>: CPT | Performed by: STUDENT IN AN ORGANIZED HEALTH CARE EDUCATION/TRAINING PROGRAM

## 2023-06-07 PROCEDURE — 1125F AMNT PAIN NOTED PAIN PRSNT: CPT | Performed by: STUDENT IN AN ORGANIZED HEALTH CARE EDUCATION/TRAINING PROGRAM

## 2023-06-07 PROCEDURE — 76942 ECHO GUIDE FOR BIOPSY: CPT | Performed by: STUDENT IN AN ORGANIZED HEALTH CARE EDUCATION/TRAINING PROGRAM

## 2023-06-07 RX ORDER — CHLORAL HYDRATE 500 MG
1000 CAPSULE ORAL
COMMUNITY

## 2023-06-07 ASSESSMENT — PATIENT HEALTH QUESTIONNAIRE - PHQ9: CLINICAL INTERPRETATION OF PHQ2 SCORE: 0

## 2023-06-07 ASSESSMENT — PAIN SCALES - GENERAL: PAINLEVEL: 7=MODERATE-SEVERE PAIN
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
## 2023-06-07 NOTE — PROCEDURES
Patient Name: Keenan Morgan  : 1970  Date of Service: 2022    Physician/s: Lin Garza MD    Pre-operative Diagnosis: Myalgia (M79.1)    Post-operative Diagnosis: Myalgia (M79.1)    Procedure: trigger point injections of the following muscles:    Site R L   Splenius capitis     Splenius cervicis     Sternocleidomastoid     Rhomboids x x   Levator scapulae     Pectoralis minor     Pectoralis major     Serratus anterior     Teres major/minor     Quadratus lumborum     Paravertebral, cervical     Paravertebral, thoracic x x   Paravertebral, lumbar     Gluteus libra     Gluteus medius     Gluteus minimus     Tensor fascia tigre     Vastus lateralis     Adductor tor     Adductor longus     Occipitalis     Cervical paraspinal     Trapezius, upper x x   Trapezius, mid     Trapezius, lower     Latissimus dorsi       Description of procedure:    The risks, benefits, and alternatives of the procedure were reviewed and discussed with the patient.  Written informed consent was freely obtained. A pre-procedural time-out was conducted by the physician verifying patient’s identity, procedure to be performed, procedure site and side, and allergy verification. Appropriate equipment was determined to be in place for the procedure.     In the office suite exam room the patient was placed in a prone position and the skin areas for injection over the above muscles were marked. A total of 6 areas of pain were identified for injection. The areas of pain were then prepped and draped in the usual sterile fashion. A solution was prepared with 5 mL of 1% lidocaine and 5 mL of 0.5% bupivacaine. Ultrasound was confirmed to view the adjacent structures for blood vessels and nerves and to confirm the needle path was not within the structures. A 27g needle was placed into each of the markings at the areas above under ultrasound guidance with an out of plane approach.. After negative aspiration, approximately 1-1.5  mL of the above solution was injected. The needle was removed intact after each trigger point injection, and the patient's back was covered with a 4x4 gauze, the area was cleansed with sterile normal saline, and a dressing was applied. There were no complications noted. The images were uploaded to our media tab for permanent storage.    Lin Garza MD  Interventional Pain and Spine  Physical Medicine and Rehabilitation  [facility]

## 2023-07-25 ENCOUNTER — HOSPITAL ENCOUNTER (OUTPATIENT)
Facility: MEDICAL CENTER | Age: 53
End: 2023-07-25
Attending: PHYSICIAN ASSISTANT
Payer: COMMERCIAL

## 2023-07-25 ENCOUNTER — OFFICE VISIT (OUTPATIENT)
Dept: MEDICAL GROUP | Facility: CLINIC | Age: 53
End: 2023-07-25
Payer: COMMERCIAL

## 2023-07-25 VITALS
HEART RATE: 67 BPM | WEIGHT: 207 LBS | OXYGEN SATURATION: 98 % | SYSTOLIC BLOOD PRESSURE: 132 MMHG | TEMPERATURE: 97.7 F | HEIGHT: 71 IN | DIASTOLIC BLOOD PRESSURE: 78 MMHG | BODY MASS INDEX: 28.98 KG/M2 | RESPIRATION RATE: 16 BRPM

## 2023-07-25 DIAGNOSIS — R79.89 ABNORMAL CBC: ICD-10-CM

## 2023-07-25 DIAGNOSIS — E55.9 VITAMIN D DEFICIENCY: ICD-10-CM

## 2023-07-25 DIAGNOSIS — Z11.4 SCREENING FOR HIV WITHOUT PRESENCE OF RISK FACTORS: ICD-10-CM

## 2023-07-25 DIAGNOSIS — I10 ESSENTIAL HYPERTENSION: ICD-10-CM

## 2023-07-25 DIAGNOSIS — R73.01 ELEVATED FASTING BLOOD SUGAR: ICD-10-CM

## 2023-07-25 DIAGNOSIS — E78.2 MIXED HYPERLIPIDEMIA: ICD-10-CM

## 2023-07-25 DIAGNOSIS — Z12.5 SCREENING FOR MALIGNANT NEOPLASM OF PROSTATE: ICD-10-CM

## 2023-07-25 DIAGNOSIS — Z12.12 SCREENING FOR COLORECTAL CANCER: ICD-10-CM

## 2023-07-25 DIAGNOSIS — Z00.00 ROUTINE PHYSICAL EXAMINATION: ICD-10-CM

## 2023-07-25 DIAGNOSIS — Z11.59 ENCOUNTER FOR HEPATITIS C SCREENING TEST FOR LOW RISK PATIENT: ICD-10-CM

## 2023-07-25 DIAGNOSIS — Z12.11 SCREENING FOR COLORECTAL CANCER: ICD-10-CM

## 2023-07-25 DIAGNOSIS — F41.1 GENERALIZED ANXIETY DISORDER: ICD-10-CM

## 2023-07-25 PROCEDURE — 3075F SYST BP GE 130 - 139MM HG: CPT | Performed by: PHYSICIAN ASSISTANT

## 2023-07-25 PROCEDURE — 82306 VITAMIN D 25 HYDROXY: CPT

## 2023-07-25 PROCEDURE — 80053 COMPREHEN METABOLIC PANEL: CPT

## 2023-07-25 PROCEDURE — 87389 HIV-1 AG W/HIV-1&-2 AB AG IA: CPT

## 2023-07-25 PROCEDURE — 86803 HEPATITIS C AB TEST: CPT

## 2023-07-25 PROCEDURE — 85025 COMPLETE CBC W/AUTO DIFF WBC: CPT

## 2023-07-25 PROCEDURE — 80061 LIPID PANEL: CPT

## 2023-07-25 PROCEDURE — 3078F DIAST BP <80 MM HG: CPT | Performed by: PHYSICIAN ASSISTANT

## 2023-07-25 PROCEDURE — 83036 HEMOGLOBIN GLYCOSYLATED A1C: CPT

## 2023-07-25 PROCEDURE — 84153 ASSAY OF PSA TOTAL: CPT

## 2023-07-25 PROCEDURE — 99214 OFFICE O/P EST MOD 30 MIN: CPT | Performed by: PHYSICIAN ASSISTANT

## 2023-07-25 RX ORDER — METOPROLOL SUCCINATE 25 MG/1
TABLET, EXTENDED RELEASE ORAL
COMMUNITY
Start: 2023-07-12 | End: 2023-07-25 | Stop reason: SDUPTHER

## 2023-07-25 RX ORDER — METOPROLOL SUCCINATE 50 MG/1
50 TABLET, EXTENDED RELEASE ORAL DAILY
Qty: 90 TABLET | Refills: 3 | Status: SHIPPED | OUTPATIENT
Start: 2023-07-25

## 2023-07-25 NOTE — PROGRESS NOTES
Chief Complaint   Patient presents with    Annual Exam     Requesting labs. BP- refill metoprolol- new condition        HISTORY OF PRESENT ILLNESS: Patient is a 52 y.o. male established patient who presents today to discuss the following issues:    Assessment/Plan  Routine physical examination  Patient is due for fasting labs for routine physical exam.  Labs have been ordered and he will follow-up in 2 weeks to review results.    Essential hypertension  Currently treated for hypertension, taking medications as prescribed.  Denies chest pain or shortness of breath. Blood pressure in the office is 132/78.  He does need refills.   Controlled    EVIE (generalized anxiety disorder)  Chronic condition.  Patient currently takes sertraline 50 mg daily with good control of his symptoms.  He was previously taking hydroxyzine 25 mg 3 times daily as needed in addition to his sertraline to help control his anxiety.  He has since stopped his hydroxyzine and is controlling his symptoms well without the additional medication.  He is requesting refill of his sertraline today.  He denies any bothersome side effects or worsening of symptoms.  Controlled      Reviewed risks and benefits of treatment plan. Patient verbally agrees to plan of care.     Patient Active Problem List    Diagnosis Date Noted    Essential hypertension 07/25/2023    Chronic midline low back pain with left-sided sciatica 10/08/2021    Mixed hyperlipidemia 09/22/2021    Abnormal finding of blood chemistry 09/22/2021    Class 1 obesity due to excess calories without serious comorbidity with body mass index (BMI) of 30.0 to 30.9 in adult 09/22/2021    Seasonal allergies 09/22/2021    Sore throat 09/22/2021    Chronic nasal congestion 09/08/2020    Vitamin D deficiency 09/08/2020    Routine physical examination 08/11/2020    EVIE (generalized anxiety disorder) 11/07/2019    Family history of malignant neoplasm of prostate 02/01/2019       Allergies:Aspirin,  "Guaifenesin, Penicillin [penicillin g potassium], and Penicillins    Current Outpatient Medications   Medication Sig Dispense Refill    metoprolol SR (TOPROL XL) 50 MG TABLET SR 24 HR Take 1 Tablet by mouth every day. 90 Tablet 3    sertraline (ZOLOFT) 50 MG Tab Take 1 Tablet by mouth every day. 30 Tablet 11    Omega-3 Fatty Acids (FISH OIL) 1000 MG Cap capsule Take 1,000 mg by mouth 3 times a day with meals.      Ferrous Gluconate (IRON 27 PO) Take  by mouth.      cyclobenzaprine (FLEXERIL) 5 mg tablet Take 1-2 Tablets by mouth 3 times a day as needed for Moderate Pain or Muscle Spasms. 270 Tablet 0    ibuprofen (MOTRIN) 600 MG Tab Take 1 Tablet by mouth every 6 hours as needed for Moderate Pain. 270 Tablet 0    fexofenadine (ALLEGRA) 60 MG Tab Take 1 Tablet by mouth every day. 90 Tablet 3    ACETAMINOPHEN EXTRA STRENGTH 500 MG Tab       vitamin D3 (CHOLECALCIFEROL) 1000 Unit (25 mcg) Tab Take 2,000 Units by mouth every day.       No current facility-administered medications for this visit.       Wt Readings from Last 3 Encounters:   08/07/23 92 kg (202 lb 12.8 oz)   07/25/23 93.9 kg (207 lb)   06/07/23 93.4 kg (205 lb 14.6 oz)   ]    Exam:  /78 (BP Location: Left arm, Patient Position: Sitting, BP Cuff Size: Adult long)   Pulse 67   Temp 36.5 °C (97.7 °F) (Temporal)   Resp 16   Ht 1.803 m (5' 11\")   Wt 93.9 kg (207 lb)   SpO2 98%  Body mass index is 28.87 kg/m².   General:  Well nourished, well developed male. No apparent distress. Not ill appearing.  Eyes: EOM intact, PERRL, conjunctiva non-injected, sclera non-icteric.  Neck: Supple with no cervical lymphadenopathy, JVD, palpable thyroid nodules or carotid bruits.  Pulmonary: Clear to ausculation bilaterally. Normal effort. No rales, rhonchi, or wheezing.  Cardiovascular: Regular rate and rhythm without murmur, rub or gallop.   Extremities: Warm and well perfused with no edema.  Skin: Intact with no obvious rashes or lesions.  Neuro: Cranial nerves " I-XII grossly intact.  Psych: Alert and oriented x 3.  Appropriately dressed. Mood and affect appropriate.    Return in about 2 weeks (around 8/8/2023) for f/u labs.  Please note that this dictation was created using voice recognition software. I have made every reasonable attempt to correct obvious errors, but I expect that there are errors of grammar and possibly content that I did not discover before finalizing the note.

## 2023-07-26 LAB
25(OH)D3 SERPL-MCNC: 37 NG/ML (ref 30–100)
ALBUMIN SERPL BCP-MCNC: 5.1 G/DL (ref 3.2–4.9)
ALBUMIN/GLOB SERPL: 2 G/DL
ALP SERPL-CCNC: 26 U/L (ref 30–99)
ALT SERPL-CCNC: 42 U/L (ref 2–50)
ANION GAP SERPL CALC-SCNC: 13 MMOL/L (ref 7–16)
AST SERPL-CCNC: 43 U/L (ref 12–45)
BASOPHILS # BLD AUTO: 1.2 % (ref 0–1.8)
BASOPHILS # BLD: 0.04 K/UL (ref 0–0.12)
BILIRUB SERPL-MCNC: 1 MG/DL (ref 0.1–1.5)
BUN SERPL-MCNC: 6 MG/DL (ref 8–22)
CALCIUM ALBUM COR SERPL-MCNC: 8.8 MG/DL (ref 8.5–10.5)
CALCIUM SERPL-MCNC: 9.7 MG/DL (ref 8.5–10.5)
CHLORIDE SERPL-SCNC: 88 MMOL/L (ref 96–112)
CHOLEST SERPL-MCNC: 162 MG/DL (ref 100–199)
CO2 SERPL-SCNC: 23 MMOL/L (ref 20–33)
CREAT SERPL-MCNC: 0.76 MG/DL (ref 0.5–1.4)
EOSINOPHIL # BLD AUTO: 0.08 K/UL (ref 0–0.51)
EOSINOPHIL NFR BLD: 2.5 % (ref 0–6.9)
ERYTHROCYTE [DISTWIDTH] IN BLOOD BY AUTOMATED COUNT: 41.2 FL (ref 35.9–50)
EST. AVERAGE GLUCOSE BLD GHB EST-MCNC: 94 MG/DL
GFR SERPLBLD CREATININE-BSD FMLA CKD-EPI: 106 ML/MIN/1.73 M 2
GLOBULIN SER CALC-MCNC: 2.6 G/DL (ref 1.9–3.5)
GLUCOSE SERPL-MCNC: 113 MG/DL (ref 65–99)
HBA1C MFR BLD: 4.9 % (ref 4–5.6)
HCT VFR BLD AUTO: 44.7 % (ref 42–52)
HCV AB SER QL: NORMAL
HDLC SERPL-MCNC: 80 MG/DL
HGB BLD-MCNC: 16 G/DL (ref 14–18)
HIV 1+2 AB+HIV1 P24 AG SERPL QL IA: NORMAL
IMM GRANULOCYTES # BLD AUTO: 0 K/UL (ref 0–0.11)
IMM GRANULOCYTES NFR BLD AUTO: 0 % (ref 0–0.9)
LDLC SERPL CALC-MCNC: 72 MG/DL
LYMPHOCYTES # BLD AUTO: 0.86 K/UL (ref 1–4.8)
LYMPHOCYTES NFR BLD: 26.7 % (ref 22–41)
MCH RBC QN AUTO: 32.9 PG (ref 27–33)
MCHC RBC AUTO-ENTMCNC: 35.8 G/DL (ref 32.3–36.5)
MCV RBC AUTO: 91.8 FL (ref 81.4–97.8)
MONOCYTES # BLD AUTO: 0.48 K/UL (ref 0–0.85)
MONOCYTES NFR BLD AUTO: 14.9 % (ref 0–13.4)
NEUTROPHILS # BLD AUTO: 1.76 K/UL (ref 1.82–7.42)
NEUTROPHILS NFR BLD: 54.7 % (ref 44–72)
NRBC # BLD AUTO: 0 K/UL
NRBC BLD-RTO: 0 /100 WBC (ref 0–0.2)
PLATELET # BLD AUTO: 225 K/UL (ref 164–446)
PMV BLD AUTO: 10.4 FL (ref 9–12.9)
POTASSIUM SERPL-SCNC: 4.6 MMOL/L (ref 3.6–5.5)
PROT SERPL-MCNC: 7.7 G/DL (ref 6–8.2)
PSA SERPL-MCNC: 0.49 NG/ML (ref 0–4)
RBC # BLD AUTO: 4.87 M/UL (ref 4.7–6.1)
SODIUM SERPL-SCNC: 124 MMOL/L (ref 135–145)
TRIGL SERPL-MCNC: 50 MG/DL (ref 0–149)
WBC # BLD AUTO: 3.2 K/UL (ref 4.8–10.8)

## 2023-08-07 ENCOUNTER — OFFICE VISIT (OUTPATIENT)
Dept: MEDICAL GROUP | Facility: CLINIC | Age: 53
End: 2023-08-07
Payer: COMMERCIAL

## 2023-08-07 VITALS
TEMPERATURE: 98.6 F | WEIGHT: 202.8 LBS | DIASTOLIC BLOOD PRESSURE: 80 MMHG | BODY MASS INDEX: 28.39 KG/M2 | SYSTOLIC BLOOD PRESSURE: 136 MMHG | HEART RATE: 59 BPM | OXYGEN SATURATION: 97 % | RESPIRATION RATE: 18 BRPM | HEIGHT: 71 IN

## 2023-08-07 DIAGNOSIS — E78.2 MIXED HYPERLIPIDEMIA: ICD-10-CM

## 2023-08-07 DIAGNOSIS — E55.9 VITAMIN D DEFICIENCY: ICD-10-CM

## 2023-08-07 DIAGNOSIS — I10 ESSENTIAL HYPERTENSION: ICD-10-CM

## 2023-08-07 DIAGNOSIS — Z00.00 ROUTINE PHYSICAL EXAMINATION: ICD-10-CM

## 2023-08-07 PROCEDURE — 99214 OFFICE O/P EST MOD 30 MIN: CPT | Performed by: PHYSICIAN ASSISTANT

## 2023-08-07 PROCEDURE — 3079F DIAST BP 80-89 MM HG: CPT | Performed by: PHYSICIAN ASSISTANT

## 2023-08-07 PROCEDURE — 3075F SYST BP GE 130 - 139MM HG: CPT | Performed by: PHYSICIAN ASSISTANT

## 2023-08-07 ASSESSMENT — FIBROSIS 4 INDEX: FIB4 SCORE: 1.59

## 2023-08-07 NOTE — PROGRESS NOTES
Chief Complaint   Patient presents with    Results     Labs        HISTORY OF PRESENT ILLNESS: Patient is a 52 y.o. male established patient who presents today to discuss the following issues:    Assessment/Plan  Routine physical examination  Patient presents to clinic today to review recent labs.  Labs are within normal limits except as noted below.  Repeat labs in 1 year.    Essential hypertension  Currently treated for hypertension, taking medications as prescribed.  Denies chest pain or shortness of breath. Blood pressure in the office is 136/80.  He does not currently need refills.   Controlled    Mixed hyperlipidemia  Chronic condition.  Currently controlled with over-the-counter supplements and lifestyle modification.  He is currently taking omega-3 fatty acid fish oil 1000 mg 3 times a day with meals.  Recent labs show TChol 162, Tri 50, HDL 80, LDL 72.  We will repeat labs in 1 year.  Controlled    Vitamin D deficiency  Patient is currently being treated with supplemental vitamin D for measured deficiency. Taking extra vitamin D in addition to trying to include more in diet. No current side effects or issues. Current level is 37. Continue taking vitamin D3 2000 IU daily.      Reviewed risks and benefits of treatment plan. Patient verbally agrees to plan of care.     Patient Active Problem List    Diagnosis Date Noted    Essential hypertension 07/25/2023    Chronic midline low back pain with left-sided sciatica 10/08/2021    Mixed hyperlipidemia 09/22/2021    Abnormal finding of blood chemistry 09/22/2021    Class 1 obesity due to excess calories without serious comorbidity with body mass index (BMI) of 30.0 to 30.9 in adult 09/22/2021    Seasonal allergies 09/22/2021    Sore throat 09/22/2021    Chronic nasal congestion 09/08/2020    Vitamin D deficiency 09/08/2020    Routine physical examination 08/11/2020    EVIE (generalized anxiety disorder) 11/07/2019    Family history of malignant neoplasm of prostate  "02/01/2019       Allergies:Aspirin, Guaifenesin, Penicillin [penicillin g potassium], and Penicillins    Current Outpatient Medications   Medication Sig Dispense Refill    metoprolol SR (TOPROL XL) 50 MG TABLET SR 24 HR Take 1 Tablet by mouth every day. 90 Tablet 3    sertraline (ZOLOFT) 50 MG Tab Take 1 Tablet by mouth every day. 30 Tablet 11    Omega-3 Fatty Acids (FISH OIL) 1000 MG Cap capsule Take 1,000 mg by mouth 3 times a day with meals.      Ferrous Gluconate (IRON 27 PO) Take  by mouth.      cyclobenzaprine (FLEXERIL) 5 mg tablet Take 1-2 Tablets by mouth 3 times a day as needed for Moderate Pain or Muscle Spasms. 270 Tablet 0    ibuprofen (MOTRIN) 600 MG Tab Take 1 Tablet by mouth every 6 hours as needed for Moderate Pain. 270 Tablet 0    fexofenadine (ALLEGRA) 60 MG Tab Take 1 Tablet by mouth every day. 90 Tablet 3    ACETAMINOPHEN EXTRA STRENGTH 500 MG Tab       vitamin D3 (CHOLECALCIFEROL) 1000 Unit (25 mcg) Tab Take 2,000 Units by mouth every day.       No current facility-administered medications for this visit.       Wt Readings from Last 3 Encounters:   08/07/23 92 kg (202 lb 12.8 oz)   07/25/23 93.9 kg (207 lb)   06/07/23 93.4 kg (205 lb 14.6 oz)   ]    Exam:  /80 (BP Location: Right arm, Patient Position: Sitting, BP Cuff Size: Adult)   Pulse (!) 59   Temp 37 °C (98.6 °F) (Temporal)   Resp 18   Ht 1.803 m (5' 11\")   Wt 92 kg (202 lb 12.8 oz)   SpO2 97%  Body mass index is 28.28 kg/m².   General:  Well nourished, well developed male. No apparent distress. Not ill appearing.  Eyes: EOM intact, PERRL, conjunctiva non-injected, sclera non-icteric.  Neck: Supple with no cervical lymphadenopathy, JVD, palpable thyroid nodules or carotid bruits.  Pulmonary: Clear to ausculation bilaterally. Normal effort. No rales, rhonchi, or wheezing.  Cardiovascular: Regular rate and rhythm without murmur, rub or gallop.   Extremities: Full range of motion. Warm and well perfused with no edema.  Skin: " Intact with no obvious rashes or lesions.  Neuro: Cranial nerves I-XII grossly intact.  Psych: Alert and oriented x 3.  Appropriately dressed. Mood and affect appropriate.    Return in about 6 months (around 2/7/2024) for establish with new provider.  Please note that this dictation was created using voice recognition software. I have made every reasonable attempt to correct obvious errors, but I expect that there are errors of grammar and possibly content that I did not discover before finalizing the note.

## 2023-08-08 NOTE — ASSESSMENT & PLAN NOTE
Patient presents to clinic today to review recent labs.  Labs are within normal limits except as noted below.  Repeat labs in 1 year.

## 2023-08-08 NOTE — ASSESSMENT & PLAN NOTE
Currently treated for hypertension, taking medications as prescribed.  Denies chest pain or shortness of breath. Blood pressure in the office is 136/80.  He does not currently need refills.   Controlled

## 2023-08-08 NOTE — ASSESSMENT & PLAN NOTE
Patient is currently being treated with supplemental vitamin D for measured deficiency. Taking extra vitamin D in addition to trying to include more in diet. No current side effects or issues. Current level is 37. Continue taking vitamin D3 2000 IU daily.

## 2023-08-08 NOTE — ASSESSMENT & PLAN NOTE
Chronic condition.  Currently controlled with over-the-counter supplements and lifestyle modification.  He is currently taking omega-3 fatty acid fish oil 1000 mg 3 times a day with meals.  Recent labs show TChol 162, Tri 50, HDL 80, LDL 72.  We will repeat labs in 1 year.  Controlled

## 2023-08-18 NOTE — ASSESSMENT & PLAN NOTE
Currently treated for hypertension, taking medications as prescribed.  Denies chest pain or shortness of breath. Blood pressure in the office is 132/78.  He does need refills.   Controlled

## 2023-08-18 NOTE — ASSESSMENT & PLAN NOTE
Patient is due for fasting labs for routine physical exam.  Labs have been ordered and he will follow-up in 2 weeks to review results.

## 2023-08-19 NOTE — ASSESSMENT & PLAN NOTE
Chronic condition.  Patient currently takes sertraline 50 mg daily with good control of his symptoms.  He was previously taking hydroxyzine 25 mg 3 times daily as needed in addition to his sertraline to help control his anxiety.  He has since stopped his hydroxyzine and is controlling his symptoms well without the additional medication.  He is requesting refill of his sertraline today.  He denies any bothersome side effects or worsening of symptoms.  Controlled

## 2023-10-13 ENCOUNTER — TELEPHONE (OUTPATIENT)
Dept: HEALTH INFORMATION MANAGEMENT | Facility: OTHER | Age: 53
End: 2023-10-13
Payer: COMMERCIAL

## 2023-12-28 DIAGNOSIS — G89.29 CHRONIC MIDLINE LOW BACK PAIN WITH LEFT-SIDED SCIATICA: ICD-10-CM

## 2023-12-28 DIAGNOSIS — M54.42 CHRONIC MIDLINE LOW BACK PAIN WITH LEFT-SIDED SCIATICA: ICD-10-CM

## 2023-12-28 NOTE — TELEPHONE ENCOUNTER
Flexeril 5 mg tabs    Received request via: Patient    Was the patient seen in the last year in this department? Yes    Does the patient have an active prescription (recently filled or refills available) for medication(s) requested?  Yes    Does the patient have [facility] and need 100 day supply (blood pressure, diabetes and cholesterol meds only)? Patient does not have SCP    *pt confirmed he has enough tabs to last him until after Dr. Garza returns to office

## 2023-12-29 RX ORDER — CYCLOBENZAPRINE HCL 5 MG
5-10 TABLET ORAL 3 TIMES DAILY PRN
Qty: 270 TABLET | Refills: 0 | Status: SHIPPED | OUTPATIENT
Start: 2023-12-29

## 2024-01-16 ENCOUNTER — APPOINTMENT (OUTPATIENT)
Dept: PHYSICAL MEDICINE AND REHAB | Facility: MEDICAL CENTER | Age: 54
End: 2024-01-16
Payer: COMMERCIAL

## 2024-07-15 ENCOUNTER — PATIENT MESSAGE (OUTPATIENT)
Dept: PHYSICAL MEDICINE AND REHAB | Facility: MEDICAL CENTER | Age: 54
End: 2024-07-15
Payer: COMMERCIAL

## 2024-08-23 DIAGNOSIS — F41.1 GENERALIZED ANXIETY DISORDER: ICD-10-CM

## 2024-08-23 DIAGNOSIS — I10 ESSENTIAL HYPERTENSION: ICD-10-CM

## 2024-08-23 NOTE — TELEPHONE ENCOUNTER
Requested Prescriptions     Pending Prescriptions Disp Refills    sertraline (ZOLOFT) 50 MG Tab 90 Tablet 0     Sig: Take 1 Tablet by mouth every day.    metoprolol SR (TOPROL XL) 50 MG TABLET SR 24 HR 90 Tablet 0     Sig: Take 1 Tablet by mouth every day.          Last office visit: 8/7/23  Last lab: 7/25/23

## 2024-08-26 RX ORDER — METOPROLOL SUCCINATE 50 MG/1
50 TABLET, EXTENDED RELEASE ORAL DAILY
Qty: 90 TABLET | Refills: 0 | Status: SHIPPED | OUTPATIENT
Start: 2024-08-26

## 2025-01-10 ENCOUNTER — HOSPITAL ENCOUNTER (OUTPATIENT)
Dept: RADIOLOGY | Facility: MEDICAL CENTER | Age: 55
End: 2025-01-10

## 2025-01-10 ENCOUNTER — HOSPITAL ENCOUNTER (INPATIENT)
Facility: MEDICAL CENTER | Age: 55
LOS: 5 days | DRG: 369 | End: 2025-01-15
Attending: STUDENT IN AN ORGANIZED HEALTH CARE EDUCATION/TRAINING PROGRAM | Admitting: HOSPITALIST
Payer: COMMERCIAL

## 2025-01-10 DIAGNOSIS — K92.2 UPPER GI BLEED: ICD-10-CM

## 2025-01-10 DIAGNOSIS — D62 ACUTE BLOOD LOSS ANEMIA: ICD-10-CM

## 2025-01-10 DIAGNOSIS — K92.1 MELENA: ICD-10-CM

## 2025-01-10 DIAGNOSIS — R57.8 HEMORRHAGIC SHOCK (HCC): ICD-10-CM

## 2025-01-10 DIAGNOSIS — S11.21XA: ICD-10-CM

## 2025-01-10 PROBLEM — F10.10 ALCOHOL ABUSE: Status: ACTIVE | Noted: 2025-01-10

## 2025-01-10 LAB
ABO + RH BLD: NORMAL
ABO GROUP BLD: ABNORMAL
ALBUMIN SERPL BCP-MCNC: 3.8 G/DL (ref 3.2–4.9)
ALBUMIN/GLOB SERPL: 2.1 G/DL
ALP SERPL-CCNC: 16 U/L (ref 30–99)
ALT SERPL-CCNC: 13 U/L (ref 2–50)
AMMONIA PLAS-SCNC: <10 UMOL/L (ref 11–45)
ANION GAP SERPL CALC-SCNC: 13 MMOL/L (ref 7–16)
APTT PPP: 20.9 SEC (ref 24.7–36)
AST SERPL-CCNC: 14 U/L (ref 12–45)
BILIRUB SERPL-MCNC: 0.5 MG/DL (ref 0.1–1.5)
BLD GP AB SCN SERPL QL: ABNORMAL
BUN SERPL-MCNC: 28 MG/DL (ref 8–22)
CALCIUM ALBUM COR SERPL-MCNC: 7.9 MG/DL (ref 8.5–10.5)
CALCIUM SERPL-MCNC: 7.7 MG/DL (ref 8.5–10.5)
CHLORIDE SERPL-SCNC: 100 MMOL/L (ref 96–112)
CO2 SERPL-SCNC: 19 MMOL/L (ref 20–33)
CREAT SERPL-MCNC: 0.63 MG/DL (ref 0.5–1.4)
ERYTHROCYTE [DISTWIDTH] IN BLOOD BY AUTOMATED COUNT: 48.2 FL (ref 35.9–50)
GFR SERPLBLD CREATININE-BSD FMLA CKD-EPI: 112 ML/MIN/1.73 M 2
GLOBULIN SER CALC-MCNC: 1.8 G/DL (ref 1.9–3.5)
GLUCOSE SERPL-MCNC: 188 MG/DL (ref 65–99)
HCT VFR BLD AUTO: 20.8 % (ref 42–52)
HGB BLD-MCNC: 6.9 G/DL (ref 14–18)
HGB BLD-MCNC: 7.2 G/DL (ref 14–18)
HOLDING TUBE BB 8507: NORMAL
INR PPP: 1.07 (ref 0.87–1.13)
LACTATE SERPL-SCNC: 1.3 MMOL/L (ref 0.5–2)
LIPASE SERPL-CCNC: 20 U/L (ref 11–82)
MCH RBC QN AUTO: 32.1 PG (ref 27–33)
MCHC RBC AUTO-ENTMCNC: 34.6 G/DL (ref 32.3–36.5)
MCV RBC AUTO: 92.9 FL (ref 81.4–97.8)
PLATELET # BLD AUTO: 171 K/UL (ref 164–446)
PMV BLD AUTO: 9.7 FL (ref 9–12.9)
POTASSIUM SERPL-SCNC: 3.8 MMOL/L (ref 3.6–5.5)
PROT SERPL-MCNC: 5.6 G/DL (ref 6–8.2)
PROTHROMBIN TIME: 13.9 SEC (ref 12–14.6)
RBC # BLD AUTO: 2.24 M/UL (ref 4.7–6.1)
RH BLD: ABNORMAL
SODIUM SERPL-SCNC: 132 MMOL/L (ref 135–145)
WBC # BLD AUTO: 6.1 K/UL (ref 4.8–10.8)

## 2025-01-10 PROCEDURE — 96368 THER/DIAG CONCURRENT INF: CPT

## 2025-01-10 PROCEDURE — 30233N1 TRANSFUSION OF NONAUTOLOGOUS RED BLOOD CELLS INTO PERIPHERAL VEIN, PERCUTANEOUS APPROACH: ICD-10-PCS | Performed by: HOSPITALIST

## 2025-01-10 PROCEDURE — 86900 BLOOD TYPING SEROLOGIC ABO: CPT | Mod: 91

## 2025-01-10 PROCEDURE — 86923 COMPATIBILITY TEST ELECTRIC: CPT

## 2025-01-10 PROCEDURE — 96366 THER/PROPH/DIAG IV INF ADDON: CPT

## 2025-01-10 PROCEDURE — 99222 1ST HOSP IP/OBS MODERATE 55: CPT | Performed by: SPECIALIST

## 2025-01-10 PROCEDURE — 85610 PROTHROMBIN TIME: CPT

## 2025-01-10 PROCEDURE — P9016 RBC LEUKOCYTES REDUCED: HCPCS

## 2025-01-10 PROCEDURE — 82140 ASSAY OF AMMONIA: CPT

## 2025-01-10 PROCEDURE — 85018 HEMOGLOBIN: CPT

## 2025-01-10 PROCEDURE — 96375 TX/PRO/DX INJ NEW DRUG ADDON: CPT

## 2025-01-10 PROCEDURE — 36430 TRANSFUSION BLD/BLD COMPNT: CPT

## 2025-01-10 PROCEDURE — 700111 HCHG RX REV CODE 636 W/ 250 OVERRIDE (IP): Performed by: HOSPITALIST

## 2025-01-10 PROCEDURE — 83690 ASSAY OF LIPASE: CPT

## 2025-01-10 PROCEDURE — 700111 HCHG RX REV CODE 636 W/ 250 OVERRIDE (IP): Performed by: STUDENT IN AN ORGANIZED HEALTH CARE EDUCATION/TRAINING PROGRAM

## 2025-01-10 PROCEDURE — 85027 COMPLETE CBC AUTOMATED: CPT

## 2025-01-10 PROCEDURE — 86850 RBC ANTIBODY SCREEN: CPT

## 2025-01-10 PROCEDURE — 96365 THER/PROPH/DIAG IV INF INIT: CPT

## 2025-01-10 PROCEDURE — 36415 COLL VENOUS BLD VENIPUNCTURE: CPT

## 2025-01-10 PROCEDURE — 93005 ELECTROCARDIOGRAM TRACING: CPT | Mod: TC | Performed by: HOSPITALIST

## 2025-01-10 PROCEDURE — 96367 TX/PROPH/DG ADDL SEQ IV INF: CPT

## 2025-01-10 PROCEDURE — 86901 BLOOD TYPING SEROLOGIC RH(D): CPT | Mod: 91

## 2025-01-10 PROCEDURE — 80053 COMPREHEN METABOLIC PANEL: CPT

## 2025-01-10 PROCEDURE — 700105 HCHG RX REV CODE 258: Performed by: STUDENT IN AN ORGANIZED HEALTH CARE EDUCATION/TRAINING PROGRAM

## 2025-01-10 PROCEDURE — 770000 HCHG ROOM/CARE - INTERMEDIATE ICU *

## 2025-01-10 PROCEDURE — 99223 1ST HOSP IP/OBS HIGH 75: CPT | Performed by: HOSPITALIST

## 2025-01-10 PROCEDURE — 85730 THROMBOPLASTIN TIME PARTIAL: CPT

## 2025-01-10 PROCEDURE — 99285 EMERGENCY DEPT VISIT HI MDM: CPT

## 2025-01-10 PROCEDURE — 83605 ASSAY OF LACTIC ACID: CPT

## 2025-01-10 PROCEDURE — 700105 HCHG RX REV CODE 258: Performed by: HOSPITALIST

## 2025-01-10 RX ORDER — ONDANSETRON 2 MG/ML
4 INJECTION INTRAMUSCULAR; INTRAVENOUS EVERY 4 HOURS PRN
Status: DISCONTINUED | OUTPATIENT
Start: 2025-01-10 | End: 2025-01-15 | Stop reason: HOSPADM

## 2025-01-10 RX ORDER — LORAZEPAM 2 MG/ML
0.5 INJECTION INTRAMUSCULAR EVERY 4 HOURS PRN
Status: DISCONTINUED | OUTPATIENT
Start: 2025-01-10 | End: 2025-01-14

## 2025-01-10 RX ORDER — FEXOFENADINE HCL 180 MG/1
180 TABLET ORAL
COMMUNITY

## 2025-01-10 RX ORDER — CALCIUM GLUCONATE 20 MG/ML
1 INJECTION, SOLUTION INTRAVENOUS ONCE
Status: COMPLETED | OUTPATIENT
Start: 2025-01-10 | End: 2025-01-10

## 2025-01-10 RX ORDER — LORAZEPAM 2 MG/1
2 TABLET ORAL
Status: DISCONTINUED | OUTPATIENT
Start: 2025-01-10 | End: 2025-01-14

## 2025-01-10 RX ORDER — LORAZEPAM 2 MG/ML
1.5 INJECTION INTRAMUSCULAR
Status: DISCONTINUED | OUTPATIENT
Start: 2025-01-10 | End: 2025-01-14

## 2025-01-10 RX ORDER — LORAZEPAM 2 MG/ML
1 INJECTION INTRAMUSCULAR
Status: DISCONTINUED | OUTPATIENT
Start: 2025-01-10 | End: 2025-01-14

## 2025-01-10 RX ORDER — FOLIC ACID 1 MG/1
1 TABLET ORAL DAILY
Status: COMPLETED | OUTPATIENT
Start: 2025-01-11 | End: 2025-01-14

## 2025-01-10 RX ORDER — DIPHENHYDRAMINE HYDROCHLORIDE 50 MG/ML
25 INJECTION INTRAMUSCULAR; INTRAVENOUS ONCE
Status: COMPLETED | OUTPATIENT
Start: 2025-01-10 | End: 2025-01-10

## 2025-01-10 RX ORDER — SODIUM CHLORIDE 9 MG/ML
INJECTION, SOLUTION INTRAVENOUS CONTINUOUS
Status: DISCONTINUED | OUTPATIENT
Start: 2025-01-10 | End: 2025-01-12

## 2025-01-10 RX ORDER — PREDNISONE 20 MG/1
20 TABLET ORAL DAILY
Status: ON HOLD | COMMUNITY
Start: 2025-01-08 | End: 2025-01-14

## 2025-01-10 RX ORDER — ONDANSETRON 4 MG/1
4 TABLET, ORALLY DISINTEGRATING ORAL EVERY 4 HOURS PRN
Status: DISCONTINUED | OUTPATIENT
Start: 2025-01-10 | End: 2025-01-15 | Stop reason: HOSPADM

## 2025-01-10 RX ORDER — LORAZEPAM 2 MG/ML
2 INJECTION INTRAMUSCULAR
Status: DISCONTINUED | OUTPATIENT
Start: 2025-01-10 | End: 2025-01-14

## 2025-01-10 RX ORDER — LISINOPRIL AND HYDROCHLOROTHIAZIDE 12.5; 2 MG/1; MG/1
1 TABLET ORAL DAILY
COMMUNITY
Start: 2024-12-05

## 2025-01-10 RX ORDER — SERTRALINE HYDROCHLORIDE 100 MG/1
100 TABLET, FILM COATED ORAL DAILY
Status: DISCONTINUED | OUTPATIENT
Start: 2025-01-11 | End: 2025-01-15 | Stop reason: HOSPADM

## 2025-01-10 RX ORDER — IBUPROFEN 600 MG/1
600 TABLET, FILM COATED ORAL
Status: ON HOLD | COMMUNITY
End: 2025-01-14

## 2025-01-10 RX ORDER — PROCHLORPERAZINE EDISYLATE 5 MG/ML
5-10 INJECTION INTRAMUSCULAR; INTRAVENOUS EVERY 4 HOURS PRN
Status: DISCONTINUED | OUTPATIENT
Start: 2025-01-10 | End: 2025-01-15 | Stop reason: HOSPADM

## 2025-01-10 RX ORDER — SERTRALINE HYDROCHLORIDE 100 MG/1
100 TABLET, FILM COATED ORAL DAILY
COMMUNITY
Start: 2024-10-23

## 2025-01-10 RX ORDER — CALCIUM GLUCONATE 20 MG/ML
2 INJECTION, SOLUTION INTRAVENOUS ONCE
Status: DISCONTINUED | OUTPATIENT
Start: 2025-01-10 | End: 2025-01-10

## 2025-01-10 RX ORDER — LORAZEPAM 2 MG/1
4 TABLET ORAL
Status: DISCONTINUED | OUTPATIENT
Start: 2025-01-10 | End: 2025-01-14

## 2025-01-10 RX ORDER — SUCRALFATE 1 G/1
1 TABLET ORAL
COMMUNITY
Start: 2025-01-09

## 2025-01-10 RX ORDER — ONDANSETRON 4 MG/1
4 TABLET, FILM COATED ORAL EVERY 6 HOURS PRN
COMMUNITY
Start: 2025-01-09

## 2025-01-10 RX ORDER — PROMETHAZINE HYDROCHLORIDE 25 MG/1
12.5-25 TABLET ORAL EVERY 4 HOURS PRN
Status: DISCONTINUED | OUTPATIENT
Start: 2025-01-10 | End: 2025-01-15 | Stop reason: HOSPADM

## 2025-01-10 RX ORDER — OXYCODONE HYDROCHLORIDE 5 MG/1
5 TABLET ORAL EVERY 4 HOURS PRN
Status: DISCONTINUED | OUTPATIENT
Start: 2025-01-10 | End: 2025-01-15 | Stop reason: HOSPADM

## 2025-01-10 RX ORDER — LORAZEPAM 1 MG/1
1 TABLET ORAL EVERY 4 HOURS PRN
Status: DISCONTINUED | OUTPATIENT
Start: 2025-01-10 | End: 2025-01-14

## 2025-01-10 RX ORDER — PROMETHAZINE HYDROCHLORIDE 25 MG/1
12.5-25 SUPPOSITORY RECTAL EVERY 4 HOURS PRN
Status: DISCONTINUED | OUTPATIENT
Start: 2025-01-10 | End: 2025-01-15 | Stop reason: HOSPADM

## 2025-01-10 RX ORDER — PANTOPRAZOLE SODIUM 40 MG/10ML
40 INJECTION, POWDER, LYOPHILIZED, FOR SOLUTION INTRAVENOUS ONCE
Status: COMPLETED | OUTPATIENT
Start: 2025-01-10 | End: 2025-01-10

## 2025-01-10 RX ORDER — LORAZEPAM 0.5 MG/1
0.5 TABLET ORAL EVERY 4 HOURS PRN
Status: DISCONTINUED | OUTPATIENT
Start: 2025-01-10 | End: 2025-01-14

## 2025-01-10 RX ORDER — GAUZE BANDAGE 2" X 2"
100 BANDAGE TOPICAL DAILY
Status: COMPLETED | OUTPATIENT
Start: 2025-01-11 | End: 2025-01-14

## 2025-01-10 RX ORDER — CYCLOBENZAPRINE HCL 10 MG
20 TABLET ORAL
COMMUNITY
Start: 2024-12-16

## 2025-01-10 RX ORDER — PROCHLORPERAZINE EDISYLATE 5 MG/ML
5 INJECTION INTRAMUSCULAR; INTRAVENOUS ONCE
Status: COMPLETED | OUTPATIENT
Start: 2025-01-10 | End: 2025-01-10

## 2025-01-10 RX ADMIN — CALCIUM GLUCONATE 1 G: 20 INJECTION, SOLUTION INTRAVENOUS at 20:37

## 2025-01-10 RX ADMIN — SODIUM CHLORIDE: 9 INJECTION, SOLUTION INTRAVENOUS at 23:08

## 2025-01-10 RX ADMIN — PROCHLORPERAZINE EDISYLATE 5 MG: 5 INJECTION INTRAMUSCULAR; INTRAVENOUS at 19:50

## 2025-01-10 RX ADMIN — PANTOPRAZOLE SODIUM 8 MG/HR: 40 INJECTION, POWDER, FOR SOLUTION INTRAVENOUS at 21:32

## 2025-01-10 RX ADMIN — OCTREOTIDE ACETATE 50 MCG/HR: 200 INJECTION, SOLUTION INTRAVENOUS; SUBCUTANEOUS at 19:59

## 2025-01-10 RX ADMIN — DIPHENHYDRAMINE HYDROCHLORIDE 25 MG: 50 INJECTION, SOLUTION INTRAMUSCULAR; INTRAVENOUS at 19:50

## 2025-01-10 RX ADMIN — PANTOPRAZOLE SODIUM 40 MG: 40 INJECTION, POWDER, FOR SOLUTION INTRAVENOUS at 19:40

## 2025-01-10 ASSESSMENT — ENCOUNTER SYMPTOMS
PALPITATIONS: 0
STRIDOR: 0
HALLUCINATIONS: 0
TREMORS: 0
POLYDIPSIA: 0
NECK PAIN: 0
CLAUDICATION: 0
WEAKNESS: 1
ORTHOPNEA: 0
HEADACHES: 0
TINGLING: 0
BLOOD IN STOOL: 1
PHOTOPHOBIA: 0
WHEEZING: 0
DIAPHORESIS: 0
EYE PAIN: 0
VOMITING: 1
DEPRESSION: 0
FLANK PAIN: 0
MYALGIAS: 0
SPUTUM PRODUCTION: 0
FALLS: 0
BACK PAIN: 0
BRUISES/BLEEDS EASILY: 0
SHORTNESS OF BREATH: 0
PND: 0
COUGH: 0
SORE THROAT: 0
BLURRED VISION: 0
DOUBLE VISION: 0
HEARTBURN: 0
CHILLS: 0
DIZZINESS: 1
NAUSEA: 1
CONSTIPATION: 0
HEMOPTYSIS: 0
DIARRHEA: 0
FEVER: 0
SINUS PAIN: 0
ABDOMINAL PAIN: 1

## 2025-01-10 ASSESSMENT — PAIN DESCRIPTION - PAIN TYPE: TYPE: ACUTE PAIN

## 2025-01-10 ASSESSMENT — LIFESTYLE VARIABLES: SUBSTANCE_ABUSE: 0

## 2025-01-10 ASSESSMENT — FIBROSIS 4 INDEX: FIB4 SCORE: 1.65

## 2025-01-11 ENCOUNTER — ANESTHESIA (OUTPATIENT)
Dept: SURGERY | Facility: MEDICAL CENTER | Age: 55
DRG: 369 | End: 2025-01-11
Payer: COMMERCIAL

## 2025-01-11 ENCOUNTER — APPOINTMENT (OUTPATIENT)
Dept: RADIOLOGY | Facility: MEDICAL CENTER | Age: 55
DRG: 369 | End: 2025-01-11
Attending: HOSPITALIST
Payer: COMMERCIAL

## 2025-01-11 ENCOUNTER — ANESTHESIA EVENT (OUTPATIENT)
Dept: SURGERY | Facility: MEDICAL CENTER | Age: 55
DRG: 369 | End: 2025-01-11
Payer: COMMERCIAL

## 2025-01-11 LAB
ALBUMIN SERPL BCP-MCNC: 3.2 G/DL (ref 3.2–4.9)
ALBUMIN/GLOB SERPL: 2.1 G/DL
ALP SERPL-CCNC: 15 U/L (ref 30–99)
ALT SERPL-CCNC: 16 U/L (ref 2–50)
ANION GAP SERPL CALC-SCNC: 12 MMOL/L (ref 7–16)
AST SERPL-CCNC: 18 U/L (ref 12–45)
BARCODED ABORH UBTYP: 600
BARCODED ABORH UBTYP: 6200
BARCODED PRD CODE UBPRD: NORMAL
BARCODED PRD CODE UBPRD: NORMAL
BARCODED UNIT NUM UBUNT: NORMAL
BARCODED UNIT NUM UBUNT: NORMAL
BASOPHILS # BLD AUTO: 0.7 % (ref 0–1.8)
BASOPHILS # BLD: 0.03 K/UL (ref 0–0.12)
BILIRUB SERPL-MCNC: 0.5 MG/DL (ref 0.1–1.5)
BUN SERPL-MCNC: 18 MG/DL (ref 8–22)
CALCIUM ALBUM COR SERPL-MCNC: 7.6 MG/DL (ref 8.5–10.5)
CALCIUM SERPL-MCNC: 7 MG/DL (ref 8.5–10.5)
CHLORIDE SERPL-SCNC: 106 MMOL/L (ref 96–112)
CO2 SERPL-SCNC: 18 MMOL/L (ref 20–33)
COMPONENT R 8504R: NORMAL
COMPONENT R 8504R: NORMAL
CREAT SERPL-MCNC: 0.57 MG/DL (ref 0.5–1.4)
EKG IMPRESSION: NORMAL
EOSINOPHIL # BLD AUTO: 0.01 K/UL (ref 0–0.51)
EOSINOPHIL NFR BLD: 0.2 % (ref 0–6.9)
ERYTHROCYTE [DISTWIDTH] IN BLOOD BY AUTOMATED COUNT: 50.9 FL (ref 35.9–50)
GFR SERPLBLD CREATININE-BSD FMLA CKD-EPI: 115 ML/MIN/1.73 M 2
GLOBULIN SER CALC-MCNC: 1.5 G/DL (ref 1.9–3.5)
GLUCOSE SERPL-MCNC: 133 MG/DL (ref 65–99)
HCT VFR BLD AUTO: 19.9 % (ref 42–52)
HGB BLD-MCNC: 6.9 G/DL (ref 14–18)
HGB BLD-MCNC: 7.7 G/DL (ref 14–18)
HGB BLD-MCNC: 8.1 G/DL (ref 14–18)
IMM GRANULOCYTES # BLD AUTO: 0.02 K/UL (ref 0–0.11)
IMM GRANULOCYTES NFR BLD AUTO: 0.5 % (ref 0–0.9)
LACTATE SERPL-SCNC: 1.3 MMOL/L (ref 0.5–2)
LYMPHOCYTES # BLD AUTO: 1.03 K/UL (ref 1–4.8)
LYMPHOCYTES NFR BLD: 23.7 % (ref 22–41)
MAGNESIUM SERPL-MCNC: 1.7 MG/DL (ref 1.5–2.5)
MCH RBC QN AUTO: 31.9 PG (ref 27–33)
MCHC RBC AUTO-ENTMCNC: 34.7 G/DL (ref 32.3–36.5)
MCV RBC AUTO: 92.1 FL (ref 81.4–97.8)
MONOCYTES # BLD AUTO: 0.51 K/UL (ref 0–0.85)
MONOCYTES NFR BLD AUTO: 11.7 % (ref 0–13.4)
NEUTROPHILS # BLD AUTO: 2.75 K/UL (ref 1.82–7.42)
NEUTROPHILS NFR BLD: 63.2 % (ref 44–72)
NRBC # BLD AUTO: 0 K/UL
NRBC BLD-RTO: 0 /100 WBC (ref 0–0.2)
PHOSPHATE SERPL-MCNC: 3.2 MG/DL (ref 2.5–4.5)
PLATELET # BLD AUTO: 111 K/UL (ref 164–446)
PMV BLD AUTO: 9.8 FL (ref 9–12.9)
POTASSIUM SERPL-SCNC: 3.5 MMOL/L (ref 3.6–5.5)
PRODUCT TYPE UPROD: NORMAL
PRODUCT TYPE UPROD: NORMAL
PROT SERPL-MCNC: 4.7 G/DL (ref 6–8.2)
RBC # BLD AUTO: 2.16 M/UL (ref 4.7–6.1)
SODIUM SERPL-SCNC: 136 MMOL/L (ref 135–145)
UNIT STATUS USTAT: NORMAL
UNIT STATUS USTAT: NORMAL
WBC # BLD AUTO: 4.4 K/UL (ref 4.8–10.8)

## 2025-01-11 PROCEDURE — 43255 EGD CONTROL BLEEDING ANY: CPT | Performed by: INTERNAL MEDICINE

## 2025-01-11 PROCEDURE — 770000 HCHG ROOM/CARE - INTERMEDIATE ICU *

## 2025-01-11 PROCEDURE — 76705 ECHO EXAM OF ABDOMEN: CPT

## 2025-01-11 PROCEDURE — 700101 HCHG RX REV CODE 250: Performed by: ANESTHESIOLOGY

## 2025-01-11 PROCEDURE — 160035 HCHG PACU - 1ST 60 MINS PHASE I: Performed by: INTERNAL MEDICINE

## 2025-01-11 PROCEDURE — 700111 HCHG RX REV CODE 636 W/ 250 OVERRIDE (IP): Performed by: HOSPITALIST

## 2025-01-11 PROCEDURE — 700111 HCHG RX REV CODE 636 W/ 250 OVERRIDE (IP): Performed by: ANESTHESIOLOGY

## 2025-01-11 PROCEDURE — 700105 HCHG RX REV CODE 258: Performed by: ANESTHESIOLOGY

## 2025-01-11 PROCEDURE — 700102 HCHG RX REV CODE 250 W/ 637 OVERRIDE(OP): Performed by: HOSPITALIST

## 2025-01-11 PROCEDURE — 93010 ELECTROCARDIOGRAM REPORT: CPT | Performed by: INTERNAL MEDICINE

## 2025-01-11 PROCEDURE — 84100 ASSAY OF PHOSPHORUS: CPT

## 2025-01-11 PROCEDURE — 160048 HCHG OR STATISTICAL LEVEL 1-5: Performed by: INTERNAL MEDICINE

## 2025-01-11 PROCEDURE — 0W3P8ZZ CONTROL BLEEDING IN GASTROINTESTINAL TRACT, VIA NATURAL OR ARTIFICIAL OPENING ENDOSCOPIC: ICD-10-PCS | Performed by: INTERNAL MEDICINE

## 2025-01-11 PROCEDURE — 160009 HCHG ANES TIME/MIN: Performed by: INTERNAL MEDICINE

## 2025-01-11 PROCEDURE — A9270 NON-COVERED ITEM OR SERVICE: HCPCS | Performed by: HOSPITALIST

## 2025-01-11 PROCEDURE — 86923 COMPATIBILITY TEST ELECTRIC: CPT

## 2025-01-11 PROCEDURE — 700105 HCHG RX REV CODE 258: Performed by: HOSPITALIST

## 2025-01-11 PROCEDURE — 80053 COMPREHEN METABOLIC PANEL: CPT

## 2025-01-11 PROCEDURE — P9016 RBC LEUKOCYTES REDUCED: HCPCS

## 2025-01-11 PROCEDURE — 160202 HCHG ENDO MINUTES - 1ST 30 MINS LEVEL 3: Performed by: INTERNAL MEDICINE

## 2025-01-11 PROCEDURE — 99233 SBSQ HOSP IP/OBS HIGH 50: CPT | Performed by: HOSPITALIST

## 2025-01-11 PROCEDURE — 83605 ASSAY OF LACTIC ACID: CPT

## 2025-01-11 PROCEDURE — 83735 ASSAY OF MAGNESIUM: CPT

## 2025-01-11 PROCEDURE — 85018 HEMOGLOBIN: CPT | Mod: 91

## 2025-01-11 PROCEDURE — 36430 TRANSFUSION BLD/BLD COMPNT: CPT

## 2025-01-11 PROCEDURE — 160002 HCHG RECOVERY MINUTES (STAT): Performed by: INTERNAL MEDICINE

## 2025-01-11 PROCEDURE — 85025 COMPLETE CBC W/AUTO DIFF WBC: CPT

## 2025-01-11 RX ORDER — EPHEDRINE SULFATE 50 MG/ML
5 INJECTION, SOLUTION INTRAVENOUS
Status: DISCONTINUED | OUTPATIENT
Start: 2025-01-11 | End: 2025-01-11 | Stop reason: HOSPADM

## 2025-01-11 RX ORDER — POTASSIUM CHLORIDE 7.45 MG/ML
10 INJECTION INTRAVENOUS
Status: DISPENSED | OUTPATIENT
Start: 2025-01-11 | End: 2025-01-11

## 2025-01-11 RX ORDER — OXYCODONE HCL 5 MG/5 ML
5 SOLUTION, ORAL ORAL
Status: DISCONTINUED | OUTPATIENT
Start: 2025-01-11 | End: 2025-01-11 | Stop reason: HOSPADM

## 2025-01-11 RX ORDER — LIDOCAINE HYDROCHLORIDE 20 MG/ML
INJECTION, SOLUTION EPIDURAL; INFILTRATION; INTRACAUDAL; PERINEURAL PRN
Status: DISCONTINUED | OUTPATIENT
Start: 2025-01-11 | End: 2025-01-11 | Stop reason: SURG

## 2025-01-11 RX ORDER — MAGNESIUM SULFATE HEPTAHYDRATE 40 MG/ML
2 INJECTION, SOLUTION INTRAVENOUS ONCE
Status: COMPLETED | OUTPATIENT
Start: 2025-01-11 | End: 2025-01-11

## 2025-01-11 RX ORDER — LABETALOL HYDROCHLORIDE 5 MG/ML
5 INJECTION, SOLUTION INTRAVENOUS
Status: DISCONTINUED | OUTPATIENT
Start: 2025-01-11 | End: 2025-01-11 | Stop reason: HOSPADM

## 2025-01-11 RX ORDER — MEPERIDINE HYDROCHLORIDE 25 MG/ML
6.25 INJECTION INTRAMUSCULAR; INTRAVENOUS; SUBCUTANEOUS
Status: DISCONTINUED | OUTPATIENT
Start: 2025-01-11 | End: 2025-01-11 | Stop reason: HOSPADM

## 2025-01-11 RX ORDER — ALBUTEROL SULFATE 5 MG/ML
2.5 SOLUTION RESPIRATORY (INHALATION)
Status: DISCONTINUED | OUTPATIENT
Start: 2025-01-11 | End: 2025-01-11 | Stop reason: HOSPADM

## 2025-01-11 RX ORDER — OXYCODONE HCL 5 MG/5 ML
10 SOLUTION, ORAL ORAL
Status: DISCONTINUED | OUTPATIENT
Start: 2025-01-11 | End: 2025-01-11 | Stop reason: HOSPADM

## 2025-01-11 RX ORDER — SODIUM CHLORIDE, SODIUM LACTATE, POTASSIUM CHLORIDE, CALCIUM CHLORIDE 600; 310; 30; 20 MG/100ML; MG/100ML; MG/100ML; MG/100ML
INJECTION, SOLUTION INTRAVENOUS CONTINUOUS
Status: DISCONTINUED | OUTPATIENT
Start: 2025-01-11 | End: 2025-01-11 | Stop reason: HOSPADM

## 2025-01-11 RX ORDER — MIDAZOLAM HYDROCHLORIDE 1 MG/ML
INJECTION INTRAMUSCULAR; INTRAVENOUS PRN
Status: DISCONTINUED | OUTPATIENT
Start: 2025-01-11 | End: 2025-01-11 | Stop reason: SURG

## 2025-01-11 RX ORDER — ACETAMINOPHEN 325 MG/1
650 TABLET ORAL EVERY 4 HOURS PRN
Status: DISCONTINUED | OUTPATIENT
Start: 2025-01-11 | End: 2025-01-15 | Stop reason: HOSPADM

## 2025-01-11 RX ORDER — HALOPERIDOL 5 MG/ML
1 INJECTION INTRAMUSCULAR
Status: DISCONTINUED | OUTPATIENT
Start: 2025-01-11 | End: 2025-01-11 | Stop reason: HOSPADM

## 2025-01-11 RX ORDER — ONDANSETRON 2 MG/ML
4 INJECTION INTRAMUSCULAR; INTRAVENOUS
Status: DISCONTINUED | OUTPATIENT
Start: 2025-01-11 | End: 2025-01-11 | Stop reason: HOSPADM

## 2025-01-11 RX ORDER — SODIUM CHLORIDE, SODIUM LACTATE, POTASSIUM CHLORIDE, CALCIUM CHLORIDE 600; 310; 30; 20 MG/100ML; MG/100ML; MG/100ML; MG/100ML
INJECTION, SOLUTION INTRAVENOUS
Status: DISCONTINUED | OUTPATIENT
Start: 2025-01-11 | End: 2025-01-11 | Stop reason: SURG

## 2025-01-11 RX ORDER — HYDRALAZINE HYDROCHLORIDE 20 MG/ML
5 INJECTION INTRAMUSCULAR; INTRAVENOUS
Status: DISCONTINUED | OUTPATIENT
Start: 2025-01-11 | End: 2025-01-11 | Stop reason: HOSPADM

## 2025-01-11 RX ORDER — DIPHENHYDRAMINE HYDROCHLORIDE 50 MG/ML
12.5 INJECTION INTRAMUSCULAR; INTRAVENOUS
Status: DISCONTINUED | OUTPATIENT
Start: 2025-01-11 | End: 2025-01-11 | Stop reason: HOSPADM

## 2025-01-11 RX ADMIN — PANTOPRAZOLE SODIUM 8 MG/HR: 40 INJECTION, POWDER, FOR SOLUTION INTRAVENOUS at 09:00

## 2025-01-11 RX ADMIN — FENTANYL CITRATE 50 MCG: 50 INJECTION, SOLUTION INTRAMUSCULAR; INTRAVENOUS at 10:03

## 2025-01-11 RX ADMIN — ACETAMINOPHEN 650 MG: 325 TABLET ORAL at 19:45

## 2025-01-11 RX ADMIN — FOLIC ACID 1 MG: 1 TABLET ORAL at 05:09

## 2025-01-11 RX ADMIN — PROPOFOL 100 MG: 10 INJECTION, EMULSION INTRAVENOUS at 10:07

## 2025-01-11 RX ADMIN — THERA TABS 1 TABLET: TAB at 05:09

## 2025-01-11 RX ADMIN — PROPOFOL 30 MG: 10 INJECTION, EMULSION INTRAVENOUS at 10:12

## 2025-01-11 RX ADMIN — SERTRALINE HYDROCHLORIDE 100 MG: 100 TABLET, FILM COATED ORAL at 05:09

## 2025-01-11 RX ADMIN — SODIUM CHLORIDE, POTASSIUM CHLORIDE, SODIUM LACTATE AND CALCIUM CHLORIDE: 600; 310; 30; 20 INJECTION, SOLUTION INTRAVENOUS at 10:03

## 2025-01-11 RX ADMIN — Medication 100 MG: at 05:09

## 2025-01-11 RX ADMIN — LIDOCAINE HYDROCHLORIDE 50 MG: 20 INJECTION, SOLUTION EPIDURAL; INFILTRATION; INTRACAUDAL; PERINEURAL at 10:07

## 2025-01-11 RX ADMIN — ACETAMINOPHEN 650 MG: 325 TABLET ORAL at 13:24

## 2025-01-11 RX ADMIN — MAGNESIUM SULFATE HEPTAHYDRATE 2 G: 2 INJECTION, SOLUTION INTRAVENOUS at 12:00

## 2025-01-11 RX ADMIN — MIDAZOLAM HYDROCHLORIDE 2 MG: 1 INJECTION, SOLUTION INTRAMUSCULAR; INTRAVENOUS at 10:03

## 2025-01-11 RX ADMIN — PROPOFOL 30 MG: 10 INJECTION, EMULSION INTRAVENOUS at 10:15

## 2025-01-11 SDOH — ECONOMIC STABILITY: TRANSPORTATION INSECURITY
IN THE PAST 12 MONTHS, HAS THE LACK OF TRANSPORTATION KEPT YOU FROM MEDICAL APPOINTMENTS OR FROM GETTING MEDICATIONS?: NO

## 2025-01-11 SDOH — ECONOMIC STABILITY: TRANSPORTATION INSECURITY
IN THE PAST 12 MONTHS, HAS LACK OF RELIABLE TRANSPORTATION KEPT YOU FROM MEDICAL APPOINTMENTS, MEETINGS, WORK OR FROM GETTING THINGS NEEDED FOR DAILY LIVING?: NO

## 2025-01-11 ASSESSMENT — SOCIAL DETERMINANTS OF HEALTH (SDOH)
WITHIN THE PAST 12 MONTHS, YOU WORRIED THAT YOUR FOOD WOULD RUN OUT BEFORE YOU GOT THE MONEY TO BUY MORE: NEVER TRUE
IN THE PAST 12 MONTHS, HAS THE ELECTRIC, GAS, OIL, OR WATER COMPANY THREATENED TO SHUT OFF SERVICE IN YOUR HOME?: NO
WITHIN THE PAST 12 MONTHS, THE FOOD YOU BOUGHT JUST DIDN'T LAST AND YOU DIDN'T HAVE MONEY TO GET MORE: NEVER TRUE
WITHIN THE LAST YEAR, HAVE TO BEEN RAPED OR FORCED TO HAVE ANY KIND OF SEXUAL ACTIVITY BY YOUR PARTNER OR EX-PARTNER?: NO
WITHIN THE LAST YEAR, HAVE YOU BEEN AFRAID OF YOUR PARTNER OR EX-PARTNER?: NO
WITHIN THE LAST YEAR, HAVE YOU BEEN KICKED, HIT, SLAPPED, OR OTHERWISE PHYSICALLY HURT BY YOUR PARTNER OR EX-PARTNER?: NO
WITHIN THE LAST YEAR, HAVE YOU BEEN HUMILIATED OR EMOTIONALLY ABUSED IN OTHER WAYS BY YOUR PARTNER OR EX-PARTNER?: NO

## 2025-01-11 ASSESSMENT — LIFESTYLE VARIABLES
AVERAGE NUMBER OF DAYS PER WEEK YOU HAVE A DRINK CONTAINING ALCOHOL: 7
TOTAL SCORE: 0
ALCOHOL_USE: YES
HEADACHE, FULLNESS IN HEAD: NOT PRESENT
TREMOR: NO TREMOR
TOTAL SCORE: 1
VISUAL DISTURBANCES: NOT PRESENT
TOTAL SCORE: 1
EVER FELT BAD OR GUILTY ABOUT YOUR DRINKING: NO
HOW MANY TIMES IN THE PAST YEAR HAVE YOU HAD 5 OR MORE DRINKS IN A DAY: 30
EVER HAD A DRINK FIRST THING IN THE MORNING TO STEADY YOUR NERVES TO GET RID OF A HANGOVER: NO
DOES PATIENT WANT TO TALK TO SOMEONE ABOUT QUITTING: NO
AUDITORY DISTURBANCES: NOT PRESENT
AGITATION: NORMAL ACTIVITY
TOTAL SCORE: 1
CONSUMPTION TOTAL: POSITIVE
DOES PATIENT WANT TO STOP DRINKING: YES
ANXIETY: NO ANXIETY (AT EASE)
ON A TYPICAL DAY WHEN YOU DRINK ALCOHOL HOW MANY DRINKS DO YOU HAVE: 6
NAUSEA AND VOMITING: NO NAUSEA AND NO VOMITING
HAVE PEOPLE ANNOYED YOU BY CRITICIZING YOUR DRINKING: NO
PAROXYSMAL SWEATS: NO SWEAT VISIBLE
HAVE YOU EVER FELT YOU SHOULD CUT DOWN ON YOUR DRINKING: YES
ORIENTATION AND CLOUDING OF SENSORIUM: ORIENTED AND CAN DO SERIAL ADDITIONS

## 2025-01-11 ASSESSMENT — PAIN DESCRIPTION - PAIN TYPE
TYPE: ACUTE PAIN
TYPE: ACUTE PAIN
TYPE: ACUTE PAIN;CHRONIC PAIN

## 2025-01-11 ASSESSMENT — COGNITIVE AND FUNCTIONAL STATUS - GENERAL
MOVING TO AND FROM BED TO CHAIR: A LOT
DRESSING REGULAR LOWER BODY CLOTHING: A LITTLE
SUGGESTED CMS G CODE MODIFIER DAILY ACTIVITY: CJ
STANDING UP FROM CHAIR USING ARMS: A LOT
WALKING IN HOSPITAL ROOM: A LOT
HELP NEEDED FOR BATHING: A LITTLE
CLIMB 3 TO 5 STEPS WITH RAILING: A LOT
MOBILITY SCORE: 15
DAILY ACTIVITIY SCORE: 22
MOVING FROM LYING ON BACK TO SITTING ON SIDE OF FLAT BED: A LITTLE
SUGGESTED CMS G CODE MODIFIER MOBILITY: CK

## 2025-01-11 ASSESSMENT — ENCOUNTER SYMPTOMS
PALPITATIONS: 0
ABDOMINAL PAIN: 0
HEADACHES: 0
NAUSEA: 0
SHORTNESS OF BREATH: 0
NERVOUS/ANXIOUS: 0
FEVER: 0
DIZZINESS: 0

## 2025-01-11 ASSESSMENT — PATIENT HEALTH QUESTIONNAIRE - PHQ9
SUM OF ALL RESPONSES TO PHQ9 QUESTIONS 1 AND 2: 0
2. FEELING DOWN, DEPRESSED, IRRITABLE, OR HOPELESS: NOT AT ALL
1. LITTLE INTEREST OR PLEASURE IN DOING THINGS: NOT AT ALL

## 2025-01-11 ASSESSMENT — FIBROSIS 4 INDEX: FIB4 SCORE: 1.27

## 2025-01-11 ASSESSMENT — PAIN SCALES - GENERAL: PAIN_LEVEL: 0

## 2025-01-11 NOTE — CONSULTS
"GASTROENTEROLOGY CONSULTATION    PATIENT NAME: Keenan Morgan  : 1970  CSN: [id number]  MRN:  [id number]     CONSULTATION DATE:  1/10/2025    PRIMARY CARE PROVIDER:  Pcp Pt States None      REASON FOR CONSULT:  melena  Consult requested by Dr. Mark Nieto    HISTORY OF PRESENT ILLNESS:  Keenan Morgan is a 54 y.o  who presented to [facility] with  1/10/2025 with l melena for the past 3 days.  Patient was seen at an outside ED two times this week for this issue, leaving AMA each time. Patient's GI bleeding worsened today, causing him to return to the hospital, where his labs revealed a hemoglobin of 6.  He has a history of alcohol misuse disorder.  Yesterday he started having more episodes of bright red blood.  He had more than 5-6 episodes of bloody stools.  Associated with intractable nausea, vomiting and hematemesis.  Patient had multiple syncopal events at home and has a laceration of his head.  The syncopal events all happened in the bathroom.  Patient drinks beer on a daily basis.  He also uses ibuprofen frequently.  Patient has a history of chronic back pain, patient takes ibuprofen \"at least 2 times\" per day due to this chronic back pain He is never had endoscopy or colonoscopy.     At [facility]   CT scan of the chest abdomen pelvis was negative     Patient received 2 units of blood transfusion at [facility].  He received an another blood transfusion by EMS in route.  Patient received an additional unit in the ER for now.  Afterwards his hemoglobin is 7.2. He has not had any further bleeding since coming to the hospital. His vitals are stable          PAST MEDICAL HISTORY:  Past Medical History:   Diagnosis Date    Acute non-recurrent maxillary sinusitis 2020    Femoroacetabular impingement of left hip 2016    Hypertension     history of; MD took pt off medication approx     Pain     left hip       PAST SURGICAL HISTORY:  Past " Surgical History:   Procedure Laterality Date    RADIO FREQUENCY ABLATION ADDITIONAL LEVEL Left 7/12/2022    Procedure: LEFT radiofrequency neurotomies medial branch targeting the L4-5 and L5-S1 facet joints with fluoroscopic guidance and sedation. Plan for 80 degree C for 90 seconds for each neurotomy.;  Surgeon: Lin Garza M.D.;  [facility];  Service: Pain Management    LUMBAR MEDIAL BRANCH BLOCKS Left 6/7/2022    Procedure: Diagnostic medial branch blocks #2 targeting the LEFT L4-5 and L5-S1 facet joints with fluoroscopic guidance;  Surgeon: Lin Garza M.D.;  Location: [facility];  Service: Pain Management    LUMBAR MEDIAL BRANCH BLOCKS Left 4/26/2022    Procedure: Diagnostic medial branch blocks targeting the LEFT L4-5 and L5-S1 facet joints with fluoroscopic guidance;  Surgeon: Lin Garza M.D.;  Location: [facility];  Service: Pain Management    HIP ARTHROSCOPY Left 12/2/2016    Procedure: HIP ARTHROSCOPY;  Surgeon: Forrest iGl M.D.;  Location: SUR[facility];  Service:     FEMORAL NECK OSTEOPLASTY Left 12/2/2016    Procedure: FEMORAL NECK OSTEOPLASTY;  Surgeon: Forrest Gil M.D.;  Location: [facility];  Service:     ACETABULAR OSTEOPLASTY Left 12/2/2016    Procedure: ACETABULAR OSTEOPLASTY, POSS PSOAS RELEASE AND YOUNG;  Surgeon: Forrest Gil M.D.;  Location: [facility];  Service:         CURRENT MEDS:  Current Facility-Administered Medications   Medication Dose Route Frequency Provider Last Rate Last Admin    octreotide (SandoSTATIN) 1,250 mcg in  mL Infusion  50 mcg/hr Intravenous Continuous Mark Nieto M.D. 10 mL/hr at 01/10/25 1959 50 mcg/hr at 01/10/25 1959    pantoprazole (Protonix) 80 mg in  mL continuous infusion  8 mg/hr Intravenous Continuous Alton Brown M.D. 10 mL/hr at 01/10/25 2132 8 mg/hr at 01/10/25 2132    [START ON 1/11/2025] sertraline  (Zoloft) tablet 100 mg  100 mg Oral DAILY Alton Brown M.D.        NS infusion   Intravenous Continuous Alton Brown M.D.        ondansetron (Zofran) syringe/vial injection 4 mg  4 mg Intravenous Q4HRS PRN Alton Brown M.D.        ondansetron (Zofran ODT) dispertab 4 mg  4 mg Oral Q4HRS PRN Alton Brown M.D.        promethazine (Phenergan) tablet 12.5-25 mg  12.5-25 mg Oral Q4HRS PRN Alton Brown M.D.        promethazine (Phenergan) suppository 12.5-25 mg  12.5-25 mg Rectal Q4HRS PRN Alton Brown M.D.        prochlorperazine (Compazine) injection 5-10 mg  5-10 mg Intravenous Q4HRS PRN Alton Brown M.D.        LORazepam (Ativan) tablet 0.5 mg  0.5 mg Oral Q4HRS PRN Alton Brown M.D.        LORazepam (Ativan) tablet 1 mg  1 mg Oral Q4HRS PRN Alton Brown M.D.        Or    LORazepam (Ativan) injection 0.5 mg  0.5 mg Intravenous Q4HRS PRN Alton Brown M.D.        LORazepam (Ativan) tablet 2 mg  2 mg Oral Q2HRS PRN Alton Brown M.D.        Or    LORazepam (Ativan) injection 1 mg  1 mg Intravenous Q2HRS PRN Alton Brown M.D.        LORazepam (Ativan) tablet 3 mg  3 mg Oral Q HOUR PRN Alton Brown M.D.        Or    LORazepam (Ativan) injection 1.5 mg  1.5 mg Intravenous Q HOUR PRN Alton Brown M.D.        LORazepam (Ativan) tablet 4 mg  4 mg Oral Q15 MIN PRN Alton Brown M.D.        Or    LORazepam (Ativan) injection 2 mg  2 mg Intravenous Q15 MIN PRN Alton Brown M.D.        [START ON 1/11/2025] thiamine (Vitamin B-1) tablet 100 mg  100 mg Oral DAILY Alton Brown M.D.        And    [START ON 1/11/2025] multivitamin tablet 1 Tablet  1 Tablet Oral DAILY Alton Brown M.D.        And    [START ON 1/11/2025] folic acid (Folvite) tablet 1 mg  1 mg Oral DAILY Alton Brown M.D.        oxyCODONE immediate-release (Roxicodone) tablet 5 mg  5 mg Oral Q4HRS PRN Alton Brown M.D.         Current Outpatient Medications   Medication Sig Dispense Refill    lisinopril-hydrochlorothiazide (PRINZIDE) 20-12.5 MG per  "tablet Take 1 Tablet by mouth every day.      ondansetron (ZOFRAN) 4 MG Tab tablet Take 4 mg by mouth every 6 hours as needed for Nausea/Vomiting.      predniSONE (DELTASONE) 20 MG Tab Take 20 mg by mouth every day. 3 day course prescribed 1/8/2025.      sucralfate (CARAFATE) 1 GM Tab Take 1 g by mouth 4 Times a Day,Before Meals and at Bedtime. 3 day course prescribed 1/9/2025.      cyclobenzaprine (FLEXERIL) 10 mg Tab Take 20 mg by mouth 1 time a day as needed for Muscle Spasms. 2 tablets = 20 mg.      sertraline (ZOLOFT) 100 MG Tab Take 100 mg by mouth every day.      fexofenadine (ALLEGRA ALLERGY) 180 MG tablet Take 180 mg by mouth 1 time a day as needed (Allergies).      ibuprofen (MOTRIN) 600 MG Tab Take 600 mg by mouth 1 time a day as needed for Moderate Pain.          ALLERGIES:  Allergies   Allergen Reactions    Aspirin Anaphylaxis     1/10/2025: Patient denies allergy. Reaction documented historically: \"Anaphylaxis\"    Guaifenesin Anaphylaxis    Penicillins Anaphylaxis       SOCIAL HISTORY:  Social History     Socioeconomic History    Marital status: Single     Spouse name: Not on file    Number of children: Not on file    Years of education: Not on file    Highest education level: Not on file   Occupational History    Not on file   Tobacco Use    Smoking status: Never    Smokeless tobacco: Current     Types: Snuff    Tobacco comments:     2 cans/week; used since age 6 years    Vaping Use    Vaping status: Never Used   Substance and Sexual Activity    Alcohol use: Not Currently     Comment: No alcohol since 2011    Drug use: No    Sexual activity: Yes   Other Topics Concern    Not on file   Social History Narrative    Not on file     Social Drivers of Health     Financial Resource Strain: Not on file   Food Insecurity: Not on file   Transportation Needs: Not on file   Physical Activity: Not on file   Stress: Not on file   Social Connections: Not on file   Intimate Partner Violence: Not on file   Housing " "Stability: Not on file       FAMILY HISTORY:  Family History   Family history unknown: Yes        REVIEW OF SYSTEMS:  General ROS: Negative for - chills, fever, night sweats or weight loss.  HEENT ROS: Negative  Respiratory ROS: Negative for - cough or shortness of breath.  Cardiovascular ROS:  Negative for - chest pain or palpitations.  Gastrointestinal ROS: As per the history of present illness.  Genito-Urinary ROS: Negative  Musculoskeletal ROS: Negative.  Neurological ROS: Negative  Skin ROS: negative  Hematology ROS: negative  Endocrinology ROS: Negative        PHYSICAL EXAM:  VITALS: /72   Pulse 95   Temp 37.7 °C (99.8 °F) (Temporal)   Resp 12   Ht 1.803 m (5' 11\")   Wt 88.5 kg (195 lb)   SpO2 95%   BMI 27.20 kg/m²   GEN:  Keenan Morgan is a 54 y.o. male in no acute distress.  HEENT: Mucous membranes pink and moist.  Sclera anicteric.    NECK:    Neck supple without lymphadenopathy or thyromegaly.  LUNGS: Clear to auscultation posteriorly.  HEART: Regular rate and rhythm. S1 and S2 normal. No murmurs, gallops  ABD:    RECTAL: Not done at this time.  EXT:  Without cyanosis, deformity or pitting edema.  SKIN:  Pink, warm, dry.  NEURO: Grossly intact, A/OR.    LABS:  Recent Labs     01/10/25  1938   WBC 6.1   MCV 92.9     Recent Labs     01/10/25  1938   GLUCOSE 188*   BUN 28*   CO2 19*     Lab Results   Component Value Date    INR 1.07 01/10/2025    INR 0.87 08/04/2005     No components found for: \"ALT\", \"AST\", \"GGT\", \"ALKPHOS\"  No results found for: \"BILINEO\"      @LASTIMGCAT(IB4988)@     @LASTIMGCAT([id number])@       IMPRESSION/PLAN:  Hematemesis  Melena  Anemia secondary to GI blood loss  Syncope  Chronic back pain - use of NSAIDs  Alcohol abuse disorder. No evidence of cirrhosis  EGD in am  Needs unit of blood  Vitals are stable. He has not had further bleeding but if he were to bleed again we can do EGD tonight. He has had a significant bleed but has stabilized  NPO  PPI IV         " Sarah Klein M.D.  Gastroenterology

## 2025-01-11 NOTE — PROGRESS NOTES
4 Eyes Skin Assessment Completed by PEE Greenwood and PEE Fierro.    Head laceration forehead  Ears Redness and Blanching  Nose WDL  Mouth WDL  Neck WDL  Breast/Chest Redness, Bruising, and Blanching  Shoulder Blades Redness and Blanching  Spine Redness and Blanching  (R) Arm/Elbow/Hand Redness, Non-Blanching, Bruising, Abrasion, and Scab  (L) Arm/Elbow/Hand Redness, Blanching, Bruising, Scab, and Scar  Abdomen Redness and Blanching  Groin WDL  Scrotum/Coccyx/Buttocks Redness and Blanching, bruise R buttock  (R) Leg Redness, Blanching, Bruising, and Abrasion  (L) Leg Redness, Blanching, and Bruising  (R) Heel/Foot/Toe Redness and Blanching  (L) Heel/Foot/Toe Redness and Blanching                                          Devices In Places ECG, Blood Pressure Cuff, and Pulse Ox      Interventions In Place Heel Mepilex, Sacral Mepilex, TAP System, Q2 Turns, and Low Air Loss Mattress    Possible Skin Injury Yes    Pictures Uploaded Into Epic Yes  Wound Consult Placed Yes  RN Wound Prevention Protocol Ordered Yes

## 2025-01-11 NOTE — PROGRESS NOTES
Bedside report received from PEE Greenwood.  Patient getting a unit of PRBCs upon day shift RN arrival for bedside report.    1252 - H&H resulted 8.1.  Provider notified.       Media Information  File Link    Monitoring Strips GE - Scan on 1/11/2025 12:00 AM        Key Information    Document ID File Type Document Type Description   [id number] Image Monitoring Strips GE      Import Information    Attached At Date Time User Dept   Encounter Level 1/11/2025  7:13 AM Physician Outpatient      Encounter    Hospital Encounter on 1/10/25         Document Information    Cardiology Imaging: Monitoring Strips GE      01/11/2025 00:00   Attached To:   Hospital Encounter on 1/10/25     Source Information    Physician Outpatient   Document History

## 2025-01-11 NOTE — ED TRIAGE NOTES
Pt BIB [facility]    Chief Complaint   Patient presents with    Upper GI Bleed     Pt transferred from [facility] for GI bleed     ABCs intact. A+OX4. Skin PWD. Vitals/tele on the monitor.    Blood currently infusing from transport. Pt also received 1 unit, octreotide and protonix at [facility].    Pt arrived to [facility] with complaint of hematemesis and melena with syncopal episodes.

## 2025-01-11 NOTE — H&P
Hospital Medicine History & Physical Note    Date of Service  1/10/2025    Primary Care Physician  Pcp Pt States None    Consultants  GI consult    Specialist Names:     Code Status  Full Code    Chief Complaint  Chief Complaint   Patient presents with    Upper GI Bleed     Pt transferred from [facility] for GI bleed       History of Presenting Illness  Keenan Morgan is a 54 y.o. male who presented 1/10/2025 with past medical history of alcohol abuse, hypertension who presents to the hospital for melena for the past 2 days.  Yesterday he started having more episodes of bright red blood.  He had more than 5-6 episodes of bloody stools.  Associated with intractable nausea, vomiting and hematemesis.  Patient had multiple syncopal events at home and has a laceration of his head.  The syncopal events all happened in the bathroom.  Patient drinks beer on a daily basis.  He also uses ibuprofen frequently.  He is never had endoscopy or colonoscopy in the past.    At [facility]   CT scan of the chest abdomen pelvis was negative    Patient received 2 units of blood transfusion at [facility].  He received an another blood transfusion by EMS in route.  Patient received an additional unit in the ER for now.  Afterwards his hemoglobin is 7.2.      I discussed the plan of care with patient.    Review of Systems  Review of Systems   Constitutional:  Negative for chills, diaphoresis, fever and malaise/fatigue.   HENT:  Negative for congestion, ear discharge, ear pain, hearing loss, nosebleeds, sinus pain, sore throat and tinnitus.    Eyes:  Negative for blurred vision, double vision, photophobia and pain.   Respiratory:  Negative for cough, hemoptysis, sputum production, shortness of breath, wheezing and stridor.    Cardiovascular:  Negative for chest pain, palpitations, orthopnea, claudication, leg swelling and PND.   Gastrointestinal:  Positive for abdominal pain, blood in stool, melena, nausea and  "vomiting. Negative for constipation, diarrhea and heartburn.   Genitourinary:  Negative for dysuria, flank pain, frequency, hematuria and urgency.   Musculoskeletal:  Negative for back pain, falls, joint pain, myalgias and neck pain.   Skin:  Negative for itching and rash.   Neurological:  Positive for dizziness and weakness. Negative for tingling, tremors and headaches.   Endo/Heme/Allergies:  Negative for environmental allergies and polydipsia. Does not bruise/bleed easily.   Psychiatric/Behavioral:  Negative for depression, hallucinations, substance abuse and suicidal ideas.        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    Surgical History   has a past surgical history that includes hip arthroscopy (Left, 12/2/2016); femoral neck osteoplasty (Left, 12/2/2016); acetabular osteoplasty (Left, 12/2/2016); lumbar medial branch blocks (Left, 4/26/2022); lumbar medial branch blocks (Left, 6/7/2022); and radio frequency ablation additional level (Left, 7/12/2022).     Family History  Family history is unknown by patient.   Family history reviewed with patient. There is no family history that is pertinent to the chief complaint.     Social History   reports that he has never smoked. His smokeless tobacco use includes snuff. He reports that he does not currently use alcohol. He reports that he does not use drugs.    Allergies  Allergies   Allergen Reactions    Aspirin Anaphylaxis     1/10/2025: Patient denies allergy. Reaction documented historically: \"Anaphylaxis\"    Guaifenesin Anaphylaxis    Penicillins Anaphylaxis       Medications  Prior to Admission Medications   Prescriptions Last Dose Informant Patient Reported? Taking?   cyclobenzaprine (FLEXERIL) 10 mg Tab 1/3/2025 Patient Yes Yes   Sig: Take 20 mg by mouth 1 time a day as needed for Muscle Spasms. 2 tablets = 20 mg. "   fexofenadine (ALLEGRA ALLERGY) 180 MG tablet 1/3/2025 Patient Yes Yes   Sig: Take 180 mg by mouth 1 time a day as needed (Allergies).   ibuprofen (MOTRIN) 600 MG Tab 1/6/2025 Patient Yes Yes   Sig: Take 600 mg by mouth 1 time a day as needed for Moderate Pain.   lisinopril-hydrochlorothiazide (PRINZIDE) 20-12.5 MG per tablet 1/9/2025 at  8:00 AM Patient Yes Yes   Sig: Take 1 Tablet by mouth every day.   ondansetron (ZOFRAN) 4 MG Tab tablet New Rx Patient Yes Yes   Sig: Take 4 mg by mouth every 6 hours as needed for Nausea/Vomiting.   predniSONE (DELTASONE) 20 MG Tab New Rx Patient Yes Yes   Sig: Take 20 mg by mouth every day. 3 day course prescribed 1/8/2025.   sertraline (ZOLOFT) 100 MG Tab 1/3/2025 Patient Yes Yes   Sig: Take 100 mg by mouth every day.   sucralfate (CARAFATE) 1 GM Tab New Rx Patient Yes Yes   Sig: Take 1 g by mouth 4 Times a Day,Before Meals and at Bedtime. 3 day course prescribed 1/9/2025.      Facility-Administered Medications: None       Physical Exam  Temp:  [37.7 °C (99.8 °F)] 37.7 °C (99.8 °F)  Pulse:  [100] 100  Resp:  [14] 14  BP: (134)/(71) 134/71  SpO2:  [98 %] 98 %  Blood Pressure: 134/71   Temperature: 37.7 °C (99.8 °F)   Pulse: 100   Respiration: 14   Pulse Oximetry: 98 %       Physical Exam  Vitals and nursing note reviewed.   Constitutional:       General: He is not in acute distress.     Appearance: Normal appearance. He is not ill-appearing, toxic-appearing or diaphoretic.   HENT:      Head: Normocephalic and atraumatic.      Nose: No congestion or rhinorrhea.      Mouth/Throat:      Pharynx: No posterior oropharyngeal erythema.   Eyes:      General: No scleral icterus.        Right eye: No discharge.   Cardiovascular:      Rate and Rhythm: Normal rate and regular rhythm.      Pulses: Normal pulses.      Heart sounds: Normal heart sounds. No murmur heard.     No friction rub. No gallop.   Pulmonary:      Effort: Pulmonary effort is normal. No respiratory distress.      Breath  "sounds: Normal breath sounds. No stridor. No wheezing, rhonchi or rales.   Abdominal:      General: There is no distension.      Tenderness: There is no abdominal tenderness.   Musculoskeletal:         General: No swelling, tenderness, deformity or signs of injury.      Cervical back: Normal range of motion.      Right lower leg: No edema.      Left lower leg: No edema.   Skin:     Coloration: Skin is not jaundiced or pale.      Findings: No bruising, erythema, lesion or rash.   Neurological:      General: No focal deficit present.      Mental Status: He is alert and oriented to person, place, and time.         Laboratory:  Recent Labs     01/10/25  1938   WBC 6.1   RBC 2.24*   HEMOGLOBIN 7.2*   HEMATOCRIT 20.8*   MCV 92.9   MCH 32.1   MCHC 34.6   RDW 48.2   PLATELETCT 171   MPV 9.7     Recent Labs     01/10/25  1938   SODIUM 132*   POTASSIUM 3.8   CHLORIDE 100   CO2 19*   GLUCOSE 188*   BUN 28*   CREATININE 0.63   CALCIUM 7.7*     Recent Labs     01/10/25  1938   ALTSGPT 13   ASTSGOT 14   ALKPHOSPHAT 16*   TBILIRUBIN 0.5   LIPASE 20   GLUCOSE 188*     Recent Labs     01/10/25  1938   APTT 20.9*   INR 1.07     No results for input(s): \"NTPROBNP\" in the last 72 hours.      No results for input(s): \"TROPONINT\" in the last 72 hours.    Imaging:  CT-OUTSIDE IMAGES-CT CHEST/ABDOMEN/PELVIS   Final Result      US-RUQ    (Results Pending)       X-Ray:  No film today  EKG:  I have personally reviewed the images and compared with prior images.    Assessment/Plan:  Justification for Admission Status  I anticipate this patient will require at least two midnights for appropriate medical management, necessitating inpatient admission because GI bleed    Patient will need a Intermediate Care (Adult and Pediatrics) bed on MEDICAL service .  The need is secondary to GI bleed.    * GI bleed- (present on admission)  Assessment & Plan  With hematemesis and melena, likely upper GI source  Continuous cardiac monitoring  Octreotide started " for ER due to alcohol abuse.  Monitor QT  Patient has been started on IV fluid hydration   NPO  Patient is started on IV Protonix  Monitor H&H every 8 hours, transfuse for hemoglobin less than 7  Coagulation studies within normal limits  We will consult GI in the morning for endoscopic evaluation      Acute blood loss anemia  Assessment & Plan  Patient was given a total of 4 units of blood  Monitor hemoglobin every hours transfuse less than 7    Alcohol abuse  Assessment & Plan  Patient will be admitted to the telemetry unit with close cardiac monitoring on CIWA protocol  Started on rally bag, multivitamin, thiamine and folate  Replete electrolytes  Patient has been counseled on alcohol cessation and will be provided with information for outpatient detox facilities      Essential hypertension- (present on admission)  Assessment & Plan  Hold blood pressure medications        VTE prophylaxis: SCDs/TEDs

## 2025-01-11 NOTE — ASSESSMENT & PLAN NOTE
EGD showed esphogeal tear  PPI IV BID  S/p transfusions of pRBC  Monitor cbc  GI consulting  Discussed holding off on NSAIDs, ASA, alcohol, hot beverages, acidic foods/drinks.

## 2025-01-11 NOTE — ANESTHESIA PREPROCEDURE EVALUATION
Case: [id number] Date/Time: 01/11/25 0805    Procedure: GASTROSCOPY    Location: [facility] OR 06 / SURGERY [facility]    Surgeons: Evelin Zamora M.D.            Relevant Problems   ANESTHESIA (within normal limits)      PULMONARY (within normal limits)      NEURO (within normal limits)      CARDIAC   (positive) Essential hypertension       (within normal limits)      ENDO (within normal limits)      Other   (positive) Acute blood loss anemia   (positive) Alcohol abuse   (positive) EVIE (generalized anxiety disorder)   (positive) GI bleed       Physical Exam    Airway   Mallampati: II  TM distance: >3 FB  Neck ROM: full       Cardiovascular - normal exam  Rhythm: regular  Rate: normal  (-) murmur     Dental - normal exam           Pulmonary - normal exam  Breath sounds clear to auscultation     Abdominal    Neurological - normal exam                   Anesthesia Plan    ASA 3- EMERGENT   ASA physical status 3 criteria: alcohol and/or substance dependence or abuseASA physical status emergent criteria: acute hemorrhage    Plan - general       Airway plan will be natural airway          Induction: intravenous    Postoperative Plan: Postoperative administration of opioids is intended.    Pertinent diagnostic labs and testing reviewed    Informed Consent:    Anesthetic plan and risks discussed with patient.    Use of blood products discussed with: patient whom consented to blood products.

## 2025-01-11 NOTE — ASSESSMENT & PLAN NOTE
1/13:  Multiple transfusions from outside facility and here.  Monitor hemoglobin every hours transfuse less than 7  1/11 EGD showed esophageal GE junction tear requiring 2 clips.  Was using much ibuprofen and was drinking more readily a week ago  Monitor cbc  IV PPI through 1/14 and severity of GI bleed,  recommendation of transition to oral PPI 1/14  Hgb:8.2  Encourage spinach, liver once able at home and future outpatient cbc

## 2025-01-11 NOTE — ANESTHESIA POSTPROCEDURE EVALUATION
Patient: Keenan Morgan    Procedure Summary       Date: 01/11/25 Room / Location: [facility] OR 06 / SURGERY [facility]    Anesthesia Start: 1003 Anesthesia Stop: 1023    Procedures:       GASTROSCOPY (Esophagus)      EGD, WITH CLIP PLACEMENT (Esophagus) Diagnosis: (GERD grade C, laceration of G-junction)    Surgeons: Evelin Zamora M.D. Responsible Provider: Kyle Bowman M.D.    Anesthesia Type: general ASA Status: 3 - Emergent            Final Anesthesia Type: general  Last vitals  BP   Blood Pressure: 124/72    Temp   36.7 °C (98.1 °F)    Pulse   68   Resp   13    SpO2   95 %      Anesthesia Post Evaluation    Patient location during evaluation: PACU  Patient participation: complete - patient participated  Level of consciousness: awake and alert  Pain score: 0    Airway patency: patent  Anesthetic complications: no  Cardiovascular status: hemodynamically stable  Respiratory status: acceptable  Hydration status: euvolemic    PONV: none          No notable events documented.     Nurse Pain Score: 0 (NPRS)

## 2025-01-11 NOTE — PROGRESS NOTES
ERP requests IMCU for GIB - the patient is no longer bleeding but previously had a 6 drop in hgb and has responded appropriately. GI is consulting. CCM is not consulting, given the patients described stability ok for IMCU.     Rigoberto Pickens M.D.

## 2025-01-11 NOTE — ANESTHESIA TIME REPORT
Anesthesia Start and Stop Event Times       Date Time Event    1/11/2025 0955 Ready for Procedure     1003 Anesthesia Start     1023 Anesthesia Stop          Responsible Staff  01/11/25      Name Role Begin End    Kyle Bowman M.D. Anesth 1003 1023          Overtime Reason:  no overtime (within assigned shift)    Comments:

## 2025-01-11 NOTE — ASSESSMENT & PLAN NOTE
Hold blood pressure medications in face of GI bleed  Restarted lisinopril 1/13 with parameters.  Monitor vitals

## 2025-01-11 NOTE — ED PROVIDER NOTES
"  ER Provider Note    Scribed for Radha Nieto M.D. by Jesse Freed. 1/10/2025   7:24 PM    Primary Care Provider: Pcp Pt States None    CHIEF COMPLAINT  Chief Complaint   Patient presents with   • Upper GI Bleed     Pt transferred from [facility] for GI bleed     EXTERNAL RECORDS REVIEWED  Outside hospital Imaging: CT chest abdomen pelvis shows no acute process.    HPI/ROS  LIMITATION TO HISTORY   None noted   OUTSIDE HISTORIAN(S):  EMS present at bedside    Keenan Morgan is a 54 y.o. male who presents to the ED for evaluation as an air transfer from [facility]. Per EMS, patient has a history of alcoholism, he noticed hematemesis and melena beginning Tuesday. Patient was seen at an outside ED 2 times this week for this issue, leaving AMA each time. Patient's GI bleeding worsened today, causing him to return to the hospital, where his labs revealed an \"H/H of 6 and 17\". EMS reports the patient received one unit of O negative blood while at the outside hospital, and has another unit of blood running currently. Patient was medicated with octreotide, 40 mg of Protonix, and Zofran before arrival here today. Patient has a history of hypertension, depression, and has allergies to guaifenesin and penicillin. Patient has a history of chronic back pain, patient takes ibuprofen \"at least 2 times\" per day due to chronic back pain. Patient states he passed out on Tuesday and struck his head. Patient denies history of cirrhosis. Patient says he has not drank for the \"last few weeks.\" Patient reports 2 syncopal episodes today. Patient denies any areas of novel pain. Patient denies history of GI bleeds or varices. Patient denies abdominal pain.  History of chronic EtOH use    PAST MEDICAL HISTORY  Past Medical History:   Diagnosis Date   • Acute non-recurrent maxillary sinusitis 8/11/2020   • Femoroacetabular impingement of left hip 12/2/2016   • Hypertension     history of; MD took pt off " medication approx 2011   • Pain     left hip     SURGICAL HISTORY  Past Surgical History:   Procedure Laterality Date   • RADIO FREQUENCY ABLATION ADDITIONAL LEVEL Left 7/12/2022    Procedure: LEFT radiofrequency neurotomies medial branch targeting the L4-5 and L5-S1 facet joints with fluoroscopic guidance and sedation. Plan for 80 degree C for 90 seconds for each neurotomy.;  Surgeon: Lin Garza M.D.;  Location: [facility];  Service: Pain Management   • LUMBAR MEDIAL BRANCH BLOCKS Left 6/7/2022    Procedure: Diagnostic medial branch blocks #2 targeting the LEFT L4-5 and L5-S1 facet joints with fluoroscopic guidance;  Surgeon: Lin Garza M.D.;  Location: [facility];  Service: Pain Management   • LUMBAR MEDIAL BRANCH BLOCKS Left 4/26/2022    Procedure: Diagnostic medial branch blocks targeting the LEFT L4-5 and L5-S1 facet joints with fluoroscopic guidance;  Surgeon: Lin Garza M.D.;  Location: [facility];  Service: Pain Management   • HIP ARTHROSCOPY Left 12/2/2016    Procedure: HIP ARTHROSCOPY;  Surgeon: Forrest Gil M.D.;  Location: [facility];  Service:    • FEMORAL NECK OSTEOPLASTY Left 12/2/2016    Procedure: FEMORAL NECK OSTEOPLASTY;  Surgeon: Forrest Gil M.D.;  Location: [facility];  Service:    • ACETABULAR OSTEOPLASTY Left 12/2/2016    Procedure: ACETABULAR OSTEOPLASTY, POSS PSOAS RELEASE AND YOUNG;  Surgeon: Forrest Gil M.D.;  Location: [facility];  Service:        FAMILY HISTORY  Family History   Family history unknown: Yes       SOCIAL HISTORY   reports that he has never smoked. His smokeless tobacco use includes snuff. He reports that he does not currently use alcohol. He reports that he does not use drugs.    CURRENT MEDICATIONS  Current Discharge Medication List        CONTINUE these medications which have NOT CHANGED    Details   lisinopril-hydrochlorothiazide (PRINZIDE)  "20-12.5 MG per tablet Take 1 Tablet by mouth every day.      ondansetron (ZOFRAN) 4 MG Tab tablet Take 4 mg by mouth every 6 hours as needed for Nausea/Vomiting.      predniSONE (DELTASONE) 20 MG Tab Take 20 mg by mouth every day. 3 day course prescribed 1/8/2025.      sucralfate (CARAFATE) 1 GM Tab Take 1 g by mouth 4 Times a Day,Before Meals and at Bedtime. 3 day course prescribed 1/9/2025.      cyclobenzaprine (FLEXERIL) 10 mg Tab Take 20 mg by mouth 1 time a day as needed for Muscle Spasms. 2 tablets = 20 mg.      sertraline (ZOLOFT) 100 MG Tab Take 100 mg by mouth every day.      fexofenadine (ALLEGRA ALLERGY) 180 MG tablet Take 180 mg by mouth 1 time a day as needed (Allergies).      ibuprofen (MOTRIN) 600 MG Tab Take 600 mg by mouth 1 time a day as needed for Moderate Pain.           ALLERGIES  Allergies   Allergen Reactions   • Aspirin Anaphylaxis     1/10/2025: Patient denies allergy. Reaction documented historically: \"Anaphylaxis\"   • Guaifenesin Anaphylaxis   • Penicillins Anaphylaxis     PHYSICAL EXAM  /71   Pulse 100   Temp 37.7 °C (99.8 °F) (Temporal)   Resp 14   Ht 1.803 m (5' 11\")   Wt 88.5 kg (195 lb)   SpO2 98%   BMI 27.20 kg/m²    General: Alert male sitting upright in bed, ill appearing, pale, acute distress  Head: Normocephalic atraumatic  Eyes: Extraocular motion intact  Neck: Supple, no rigidity  Cardiovascular: Tachycardic rate and regular rhythm no murmurs rubs or gallops  Respiratory: Clear to auscultation bilaterally, equal chest rise and fall, no increased work of breathing  Abdomen: Soft nontender no guarding, no evidence of cirrhosis on exam.  Musculoskeletal: Warm and well perfused, no peripheral edema  Neuro: Alert, no focal deficits  Integumentary: No wounds or rashes, pale    DIAGNOSTIC STUDIES    Labs:   Labs Reviewed   CBC WITHOUT DIFFERENTIAL - Abnormal; Notable for the following components:       Result Value    RBC 2.24 (*)     Hemoglobin 7.2 (*)     Hematocrit " 20.8 (*)     All other components within normal limits   COMP METABOLIC PANEL - Abnormal; Notable for the following components:    Sodium 132 (*)     Co2 19 (*)     Glucose 188 (*)     Bun 28 (*)     Calcium 7.7 (*)     Correct Calcium 7.9 (*)     Alkaline Phosphatase 16 (*)     Total Protein 5.6 (*)     Globulin 1.8 (*)     All other components within normal limits   APTT - Abnormal; Notable for the following components:    APTT 20.9 (*)     All other components within normal limits   AMMONIA - Abnormal; Notable for the following components:    Ammonia <10 (*)     All other components within normal limits   COD (ADULT) - Abnormal; Notable for the following components:    ABO Grouping Only A (*)     Rh Grouping Only POS (*)     All other components within normal limits   HGB - Abnormal; Notable for the following components:    Hemoglobin 6.9 (*)     All other components within normal limits   LIPASE   PROTHROMBIN TIME   HOLD BLOOD BANK SPECIMEN (NOT TESTED)   ESTIMATED GFR   LACTIC ACID   COMPONENT CELLULAR   ABO RH CONFIRM   LACTIC ACID     Radiology:       Radiologist interpretation:   CT-OUTSIDE IMAGES-CT CHEST/ABDOMEN/PELVIS   Final Result      US-RUQ    (Results Pending)        INITIAL ASSESSMENT COURSE AND PLAN  Care Narrative     7:24 PM - Patient was evaluated at bedside. They present for hematemesis and melena. Pertinent PE findings include: pale, tachycardic, no evidence of cirrhosis on exam. Differential diagnoses include but not limited to: gastric ulcer, variceal bleed, acute blood loss anemia, cirrhosis, coagulopathy    Continued second unit transfusion, repeat H&H, patient only received 40 mg of Protonix thus additional 40 will be administered in a bolus here, patient will be started on octreotide infusion given history of EtOH use, no history of endoscopy to confirm absence of varices.    I discussed the case with Dr. Klein, gastroenterology, who is aware the patient, no plan for imminent scope  tonight.    I discussed the case with Dr. Pickens, intensivist, he agreed with IMCU admission for close observation at higher level of care given 5 point hemoglobin drop overnight.  Patient's blood pressure appears to have improved with blood transfusion.    8:25 PM I discussed the patient's case and the above findings with Dr. Brown (Hospitalist) who will admit the patient under his care.    ED Course as of 01/10/25 2251  ------------------------------------------------------------  Time: 01/10 2023  Value: Sodium(!): 132  Comment: MELD: 7  By: RS  ------------------------------------------------------------  Time: 01/10 2024  Value: Hemoglobin(!): 7.2  Comment: Appropriate response to transfusion  By: RS  ------------------------------------------------------------  Time: 01/10 2248  Value: Ammonia(!): <10  Comment: Not elevated  By: RS  ------------------------------------------------------------  Time: 01/10 2249  Value: Sodium(!): 132  Comment: (Reviewed)  By: RS  ------------------------------------------------------------  Time: 01/10 2249  Value: Bun(!): 28  Comment: Likely due to upper GI bleed  By: HIRAL  ------------------------------------------------------------  Time: 01/10 2249  Value: Hemoglobin(!): 7.2  Comment: Adequate response to 1 unit transfusion  By: HIRAL  ------------------------------------------------------------  Time: 01/10 2249  Value: CT-OUTSIDE IMAGES-CT CHEST/ABDOMEN/PELVIS  Comment: No acute process on outside CT  By: HIRAL       DISPOSITION:  Patient will be hospitalized by Dr. Brown in guarded condition.     FINAL DIAGNOSIS  1. Upper GI bleed    2. Melena    3. Acute blood loss anemia    4. Hemorrhagic shock (HCC)    5.  CRITICAL CARE  The very real possibility of a deterioration of this patient's condition required the highest level of my preparedness for sudden, emergent intervention.  I provided critical care services, which included medication orders, frequent reevaluations of the  patient's condition and response to treatment, ordering and reviewing test results, and discussing the case with various consultants.  The critical care time associated with the care of the patient was 35 minutes. Review chart for interventions. This time is exclusive of any other billable procedures.       IJesse (Cecy), am scribing for, and in the presence of, Mark Nieto M.D..    Electronically signed by: Jesse Freed (Cecy), 1/10/2025    IMark M.D. personally performed the services described in this documentation, as scribed by Jesse Freed in my presence, and it is both accurate and complete.      The note accurately reflects work and decisions made by me.  Mark Nieto M.D.  1/10/2025  10:51 PM

## 2025-01-11 NOTE — PROCEDURES
OPERATIVE REPORT    PATIENT:   Keenan Morgan   1970       PREOPERATIVE DIAGNOSES/INDICATIONS: Upper GI bleeding.     POSTOPERATIVE DIAGNOSIS:   GERD grade, tear at GE junction s/p 2 clips (attempt 3, one was not at ideal location).     PROCEDURE:  ESOPHAGOGASTRODUODENOSCOPY    PHYSICIAN:  Evelin Zamora MD. MPH.    ANESTHESIA:  Per anesthesiologist or ICU/ED team.     LOCATION: [facility]    CONSENT:  OBTAINED. The risks, benefits and alternatives of the procedure were discussed in details. The risks include and are not limited to bleeding, infection, perforation, missed lesions, and sedations risks (cardiopulmonary compromise and allergic reaction to medications).    DESCRIPTION: Scope team at bedside  Patient was placed on his/her left lateral decubitus position. A bite block was placed in patient's mouth. Patient was sedated by anesthesia.  Vital signs were monitored throughout the procedure.  Oxygenation support was provided throughout the procedure. Upper endoscope was inserted into patient's mouth and advanced to the second portion of the duodenum under direct visualization.      Once the site was reached and examined, the upper endoscope was withdrawn.  Retroflexion was made within the stomach.  The stomach was decompressed, scope was withdrawn and the procedure was terminated.    The patient tolerated the procedure well.  There were no immediate complications.    Estimate procedure related bleeding: less than 1cc.     OPERATIVE FINDINGS:    1. Esophagus: GERD grade, tear at GE junction s/p 2 clips (attempt 3, one was not at ideal location).   2. Stomach: Grossly normal.   3. Duodenum: Grossly normal.     RECOMMENDATIONS:    Clear liquid today, then advance tmr.   PPI iv bid total 3 days. Then change to oral bid for 8 weeks.     GI signs off.   Consider outpatient follow up EGD.     This note has been transcribed with digital voice recognition software and although it has been reviewed may  contain grammatical or word errors

## 2025-01-11 NOTE — PROGRESS NOTES
54 years old gentleman presented to inpatient GI service for melena, tarry stool.  History of alcohol use.  Could be ulcer, GERD, cirrhosis related bleeding.  Vascular bleeding could be possible as well.  Less likely to be cancer.    Diagnosis: Upper GI bleeding.    Procedure: Esophagogastroduodenoscopy with bleeding control and possible biopsy.

## 2025-01-11 NOTE — CARE PLAN
The patient is Watcher - Medium risk of patient condition declining or worsening    Shift Goals  Clinical Goals: stable q8 hgb, blood transfusion, stable hemodynamics  Patient Goals: comfort, rest, figure out GI bleed  Family Goals: STEPHEN    Progress made toward(s) clinical / shift goals:    Problem: Knowledge Deficit - Standard  Goal: Patient and family/care givers will demonstrate understanding of plan of care, disease process/condition, diagnostic tests and medications  Outcome: Progressing     Problem: Optimal Care for Alcohol Withdrawal  Goal: Optimal Care for the alcohol withdrawal patient  Outcome: Progressing     Problem: Psychosocial  Goal: Patient's level of anxiety will decrease  Outcome: Progressing     Problem: Skin Integrity  Goal: Skin integrity is maintained or improved  Outcome: Progressing     Problem: Fall Risk  Goal: Patient will remain free from falls  Outcome: Progressing     Problem: Pain - Standard  Goal: Alleviation of pain or a reduction in pain to the patient’s comfort goal  Outcome: Progressing

## 2025-01-11 NOTE — ASSESSMENT & PLAN NOTE
No EtOH withdrawal.  Cessation advised due to harms of further GIB  No signs of withdrawal, DC CIWA protocol  S/p rally bag, multivitamin, thiamine and folate

## 2025-01-11 NOTE — ED NOTES
Med rec completed per patient at bedside.    Allergies reviewed with patient.    Outpatient antibiotics within the last 30 days: NONE.    ANTICOAGULANTS: NONE.    Preferred pharmacy: [facility]. in [place], [place].    *Patient was prescribed a 3 day course of prednisone on 1/8/2025, and a 3 day course of sucralfate as well as PRN ondansetron on 1/9/2025. Patient states he has not picked up any of these medications.

## 2025-01-11 NOTE — PROGRESS NOTES
Bedside report received from [facility].  Patient getting a unit of PRBCs upon day shift RN arrival for bedside report.       Media Information  File Link    Monitoring Strips GE - Scan on 1/11/2025 12:00 AM        Key Information    Document ID File Type Document Type Description   [id number] Image Monitoring Strips GE      Import Information    Attached At Date Time User Dept   Encounter Level 1/11/2025  7:13 AM Physician Outpatient      Encounter    Hospital Encounter on 1/10/25         Document Information    Cardiology Imaging: Monitoring Strips GE      01/11/2025 00:00   Attached To:   Hospital Encounter on 1/10/25     Source Information    Physician Outpatient   Document History

## 2025-01-11 NOTE — PROGRESS NOTES
[facility] Medicine Daily Progress Note    Date of Service  1/11/2025    Chief Complaint  Upper GI bleed transferred from [facility]    Hospital Course  Keenan Morgan is a 54 y.o. male w/ a hx of EtOH, HTN that was admitted 1/10/2025 with bright red blood per rectum along with syncopal episodes at home with a subsequent laceration to his head. Report of ibuprofen use and daily beers.   At [facility] he received 2 units pRBC and another in route to [facility].  In the [facility] ER initial Hgb:7.2.  Gastroenterology consulting for EGD    Interval Problem Update  1/11: EGD pending.  Another 1 unit pRBCs transfused overnight. Hgb:6.9, Plt:111, K:3.5    I have discussed this patient's plan of care and discharge plan at IDT rounds today with Case Management, Nursing, Nursing leadership, and other members of the IDT team.    Consultants/Specialty  GI    Code Status  Full Code    Disposition  The patient is not medically cleared for discharge to home or a post-acute facility.  Anticipate discharge to: home with close outpatient follow-up    I have placed the appropriate orders for post-discharge needs.    Review of Systems  Review of Systems   Constitutional:  Negative for fever and malaise/fatigue.   Respiratory:  Negative for shortness of breath.    Cardiovascular:  Negative for chest pain and palpitations.   Gastrointestinal:  Positive for melena. Negative for abdominal pain and nausea.   Genitourinary:  Negative for dysuria.   Neurological:  Negative for dizziness and headaches.   Psychiatric/Behavioral:  The patient is not nervous/anxious.         Physical Exam  Temp:  [36.8 °C (98.2 °F)-38.1 °C (100.6 °F)] 36.8 °C (98.2 °F)  Pulse:  [] 67  Resp:  [11-41] 14  BP: (122-148)/(57-84) 144/76  SpO2:  [95 %-100 %] 99 %    Physical Exam  Vitals reviewed.   Constitutional:       Appearance: He is not ill-appearing.   HENT:      Head: Normocephalic.      Comments: Frontal head laceration w/o bleeding,  swelling, nor erythema     Nose: Nose normal.   Eyes:      Conjunctiva/sclera: Conjunctivae normal.   Cardiovascular:      Rate and Rhythm: Normal rate and regular rhythm.      Pulses: Normal pulses.      Heart sounds: No murmur heard.  Pulmonary:      Effort: Pulmonary effort is normal. No respiratory distress.      Breath sounds: Normal breath sounds.   Abdominal:      General: Bowel sounds are normal. There is no distension.   Musculoskeletal:      Cervical back: Neck supple.      Right lower leg: No edema.   Skin:     General: Skin is warm.   Neurological:      General: No focal deficit present.      Mental Status: He is alert and oriented to person, place, and time.      Cranial Nerves: No cranial nerve deficit.   Psychiatric:         Mood and Affect: Mood normal.         Thought Content: Thought content normal.         Fluids    Intake/Output Summary (Last 24 hours) at 1/11/2025 0719  Last data filed at 1/11/2025 0200  Gross per 24 hour   Intake 350 ml   Output 600 ml   Net -250 ml        Laboratory  Recent Labs     01/10/25  1938 01/10/25  2134 01/11/25  0515   WBC 6.1  --  4.4*   RBC 2.24*  --  2.16*   HEMOGLOBIN 7.2* 6.9* 6.9*   HEMATOCRIT 20.8*  --  19.9*   MCV 92.9  --  92.1   MCH 32.1  --  31.9   MCHC 34.6  --  34.7   RDW 48.2  --  50.9*   PLATELETCT 171  --  111*   MPV 9.7  --  9.8     Recent Labs     01/10/25  1938 01/11/25  0515   SODIUM 132* 136   POTASSIUM 3.8 3.5*   CHLORIDE 100 106   CO2 19* 18*   GLUCOSE 188* 133*   BUN 28* 18   CREATININE 0.63 0.57   CALCIUM 7.7* 7.0*     Recent Labs     01/10/25  1938   APTT 20.9*   INR 1.07               Imaging  US-RUQ   Final Result      1.  Hepatomegaly and hepatic steatosis      CT-OUTSIDE IMAGES-CT CHEST/ABDOMEN/PELVIS   Final Result           Assessment/Plan  * GI bleed- (present on admission)  Assessment & Plan  1/11  EGD pending   PPI IV BID  S/p transfusions of pRBC  Monitor cbc  GI consulting  Discussed holding off on NSAIDs, ASA, alcohol, hot  beverages, acidic foods/drinks.      Acute blood loss anemia  Assessment & Plan  Multiple transfusions from outside facility and here.  Monitor hemoglobin every hours transfuse less than 7  EGD pending   Was using much ibuprofen and was drinking more readily a week ago  Monitor cbc    Alcohol abuse  Assessment & Plan  Patient will be admitted to the telemetry unit with close cardiac monitoring on CIWA protocol  Started on rally bag, multivitamin, thiamine and folate  Replete electrolytes  Patient has been counseled on alcohol cessation and will be provided with information for outpatient detox facilities  1/11 cessation encouraged      Essential hypertension- (present on admission)  Assessment & Plan  1/11  Hold blood pressure medications in face of GI bleed  Restart lisinopril as BP allows.  Monitor vitals         VTE prophylaxis:   SCDs/TEDs      I have performed a physical exam and reviewed and updated ROS and Plan today (1/11/2025). In review of yesterday's note (1/10/2025), there are no changes except as documented above.

## 2025-01-12 PROBLEM — S11.21XA ESOPHAGEAL TEAR, INITIAL ENCOUNTER: Status: ACTIVE | Noted: 2025-01-12

## 2025-01-12 LAB
ALBUMIN SERPL BCP-MCNC: 3.1 G/DL (ref 3.2–4.9)
ALBUMIN/GLOB SERPL: 1.9 G/DL
ALP SERPL-CCNC: 20 U/L (ref 30–99)
ALT SERPL-CCNC: 18 U/L (ref 2–50)
ANION GAP SERPL CALC-SCNC: 9 MMOL/L (ref 7–16)
AST SERPL-CCNC: 24 U/L (ref 12–45)
BILIRUB SERPL-MCNC: 0.4 MG/DL (ref 0.1–1.5)
BUN SERPL-MCNC: 7 MG/DL (ref 8–22)
CALCIUM ALBUM COR SERPL-MCNC: 8.3 MG/DL (ref 8.5–10.5)
CALCIUM SERPL-MCNC: 7.6 MG/DL (ref 8.5–10.5)
CHLORIDE SERPL-SCNC: 102 MMOL/L (ref 96–112)
CO2 SERPL-SCNC: 19 MMOL/L (ref 20–33)
CREAT SERPL-MCNC: 0.66 MG/DL (ref 0.5–1.4)
ERYTHROCYTE [DISTWIDTH] IN BLOOD BY AUTOMATED COUNT: 52.5 FL (ref 35.9–50)
GFR SERPLBLD CREATININE-BSD FMLA CKD-EPI: 110 ML/MIN/1.73 M 2
GLOBULIN SER CALC-MCNC: 1.6 G/DL (ref 1.9–3.5)
GLUCOSE SERPL-MCNC: 128 MG/DL (ref 65–99)
HCT VFR BLD AUTO: 22 % (ref 42–52)
HGB BLD-MCNC: 7.6 G/DL (ref 14–18)
HGB BLD-MCNC: 8 G/DL (ref 14–18)
MCH RBC QN AUTO: 32.8 PG (ref 27–33)
MCHC RBC AUTO-ENTMCNC: 34.5 G/DL (ref 32.3–36.5)
MCV RBC AUTO: 94.8 FL (ref 81.4–97.8)
PLATELET # BLD AUTO: 103 K/UL (ref 164–446)
PMV BLD AUTO: 9.9 FL (ref 9–12.9)
POTASSIUM SERPL-SCNC: 3.3 MMOL/L (ref 3.6–5.5)
PROT SERPL-MCNC: 4.7 G/DL (ref 6–8.2)
RBC # BLD AUTO: 2.32 M/UL (ref 4.7–6.1)
SODIUM SERPL-SCNC: 130 MMOL/L (ref 135–145)
WBC # BLD AUTO: 4.9 K/UL (ref 4.8–10.8)

## 2025-01-12 PROCEDURE — 97602 WOUND(S) CARE NON-SELECTIVE: CPT

## 2025-01-12 PROCEDURE — 700102 HCHG RX REV CODE 250 W/ 637 OVERRIDE(OP): Performed by: HOSPITALIST

## 2025-01-12 PROCEDURE — 700111 HCHG RX REV CODE 636 W/ 250 OVERRIDE (IP): Performed by: HOSPITALIST

## 2025-01-12 PROCEDURE — 85018 HEMOGLOBIN: CPT

## 2025-01-12 PROCEDURE — 770001 HCHG ROOM/CARE - MED/SURG/GYN PRIV*

## 2025-01-12 PROCEDURE — A9270 NON-COVERED ITEM OR SERVICE: HCPCS | Performed by: HOSPITALIST

## 2025-01-12 PROCEDURE — 700105 HCHG RX REV CODE 258: Performed by: HOSPITALIST

## 2025-01-12 PROCEDURE — 99233 SBSQ HOSP IP/OBS HIGH 50: CPT | Performed by: HOSPITALIST

## 2025-01-12 PROCEDURE — 80053 COMPREHEN METABOLIC PANEL: CPT

## 2025-01-12 PROCEDURE — 85027 COMPLETE CBC AUTOMATED: CPT

## 2025-01-12 RX ORDER — POTASSIUM CHLORIDE 1500 MG/1
40 TABLET, EXTENDED RELEASE ORAL ONCE
Status: COMPLETED | OUTPATIENT
Start: 2025-01-12 | End: 2025-01-12

## 2025-01-12 RX ORDER — PANTOPRAZOLE SODIUM 40 MG/10ML
40 INJECTION, POWDER, LYOPHILIZED, FOR SOLUTION INTRAVENOUS 2 TIMES DAILY
Status: COMPLETED | OUTPATIENT
Start: 2025-01-12 | End: 2025-01-14

## 2025-01-12 RX ADMIN — SERTRALINE HYDROCHLORIDE 100 MG: 100 TABLET, FILM COATED ORAL at 05:02

## 2025-01-12 RX ADMIN — ACETAMINOPHEN 650 MG: 325 TABLET ORAL at 17:47

## 2025-01-12 RX ADMIN — ACETAMINOPHEN 650 MG: 325 TABLET ORAL at 08:49

## 2025-01-12 RX ADMIN — Medication 100 MG: at 05:02

## 2025-01-12 RX ADMIN — PANTOPRAZOLE SODIUM 8 MG/HR: 40 INJECTION, POWDER, FOR SOLUTION INTRAVENOUS at 06:01

## 2025-01-12 RX ADMIN — PANTOPRAZOLE SODIUM 40 MG: 40 INJECTION, POWDER, FOR SOLUTION INTRAVENOUS at 17:41

## 2025-01-12 RX ADMIN — PANTOPRAZOLE SODIUM 40 MG: 40 INJECTION, POWDER, FOR SOLUTION INTRAVENOUS at 08:53

## 2025-01-12 RX ADMIN — ACETAMINOPHEN 650 MG: 325 TABLET ORAL at 01:21

## 2025-01-12 RX ADMIN — POTASSIUM CHLORIDE 40 MEQ: 1500 TABLET, EXTENDED RELEASE ORAL at 08:54

## 2025-01-12 RX ADMIN — FOLIC ACID 1 MG: 1 TABLET ORAL at 05:02

## 2025-01-12 RX ADMIN — THERA TABS 1 TABLET: TAB at 05:02

## 2025-01-12 RX ADMIN — ACETAMINOPHEN 650 MG: 325 TABLET ORAL at 13:40

## 2025-01-12 ASSESSMENT — ENCOUNTER SYMPTOMS
SHORTNESS OF BREATH: 0
ABDOMINAL PAIN: 0
NAUSEA: 0
HEARTBURN: 0
BLOOD IN STOOL: 0
NERVOUS/ANXIOUS: 0
HEADACHES: 1

## 2025-01-12 ASSESSMENT — FIBROSIS 4 INDEX: FIB4 SCORE: 2.27

## 2025-01-12 ASSESSMENT — PAIN DESCRIPTION - PAIN TYPE
TYPE: ACUTE PAIN

## 2025-01-12 NOTE — CARE PLAN
The patient is Watcher - Medium risk of patient condition declining or worsening    Shift Goals  Clinical Goals: Monitor hgb;hemodynimc stability  Patient Goals: rest; go home  Family Goals: STEPHEN    Progress made toward(s) clinical / shift goals:    Problem: Knowledge Deficit - Standard  Goal: Patient and family/care givers will demonstrate understanding of plan of care, disease process/condition, diagnostic tests and medications  Outcome: Progressing     Problem: Risk for Aspiration  Goal: Patient's risk for aspiration will be absent or decrease  Outcome: Progressing     Problem: Skin Integrity  Goal: Skin integrity is maintained or improved  Outcome: Progressing     Problem: Fall Risk  Goal: Patient will remain free from falls  Outcome: Progressing     Problem: Pain - Standard  Goal: Alleviation of pain or a reduction in pain to the patient’s comfort goal  Outcome: Progressing       Patient is not progressing towards the following goals:

## 2025-01-12 NOTE — CARE PLAN
The patient is Stable - Low risk of patient condition declining or worsening    Shift Goals  Clinical Goals: Pt will remain hemodynamically stable during shift  Patient Goals: rest; go home  Family Goals: STEPHEN    Progress made toward(s) clinical / shift goals:       BP stabilized, VS taken Q2 hours, pt on continuous cardiac monitoring. Daily labs drawn.     Problem: Pain - Standard  Goal: Alleviation of pain or a reduction in pain to the patient’s comfort goal  Outcome: Progressing  Note: Pt assessed for pain regularly and medicated PRN per MAR.      Problem: Fall Risk  Goal: Patient will remain free from falls  Outcome: Progressing  Note: Fall precautions in place. Bed in lowest position. Non-skid socks in place. Personal possessions within reach. Mobility sign on door. Bed-alarm on. Call light within reach. Pt educated regarding fall prevention and states understanding.      Problem: Knowledge Deficit - Standard  Goal: Patient and family/care givers will demonstrate understanding of plan of care, disease process/condition, diagnostic tests and medications  Outcome: Progressing  Note: Pt educated regarding plan of care and medications. All questions answered.

## 2025-01-12 NOTE — CARE PLAN
The patient is Stable - Low risk of patient condition declining or worsening    Shift Goals  Clinical Goals: Administer blood, monitor abnormal labs, hemodynamic stability  Patient Goals: Rest, increased knowledge of plan of care  Family Goals: no family present at bedside    Progress made toward(s) clinical / shift goals:    Problem: Knowledge Deficit - Standard  Goal: Patient and family/care givers will demonstrate understanding of plan of care, disease process/condition, diagnostic tests and medications  Outcome: Progressing     Problem: Optimal Care for Alcohol Withdrawal  Goal: Optimal Care for the alcohol withdrawal patient  Outcome: Progressing     Problem: Seizure Precautions  Goal: Implementation of seizure precautions  Outcome: Progressing     Problem: Lifestyle Changes  Goal: Patient's ability to identify lifestyle changes and available resources to help reduce recurrence of condition will improve  Outcome: Progressing       Patient is not progressing towards the following goals:

## 2025-01-12 NOTE — ASSESSMENT & PLAN NOTE
Continue IV PPI BID for a total of 72 hours IV PPI, then transition to oral PPI on 1/14  Monitor cbc Hgb:8.2  Advance liquid diet as tolerates

## 2025-01-12 NOTE — PROGRESS NOTES
Hospital Medicine Daily Progress Note    Date of Service  1/12/2025    Chief Complaint  Upper GI bleed transferred from [facility]    Hospital Course  Keenan Morgan is a 54 y.o. male w/ a hx of EtOH, HTN that was admitted 1/10/2025 with bright red blood per rectum along with syncopal episodes at home with a subsequent laceration to his head. Report of ibuprofen use and daily beers.   At [facility] he received 2 units pRBC and another in route to [facility].  In the [facility] ER initial Hgb:7.2.  Gastroenterology consulting for EGD    Interval Problem Update  1/12: EGD showed esophageal GE junction tear  and had 2 clips. Hgb:7.6, K:3.3, Na:130.  No further melena.  Tolerating clear liquids.  Adjust PPI to IV BID x 72hrs total IV PPI then to oral. DIscussed no hot beverages, alcohol, nor acidic foods/drinks.    1/11: EGD pending.  Another 1 unit pRBCs transfused overnight. Hgb:6.9, Plt:111, K:3.5    I have discussed this patient's plan of care and discharge plan at IDT rounds today with Case Management, Nursing, Nursing leadership, and other members of the IDT team.    Consultants/Specialty  GI    Code Status  Full Code    Disposition  The patient is not medically cleared for discharge to home or a post-acute facility.  Anticipate discharge to: home with close outpatient follow-up    I have placed the appropriate orders for post-discharge needs.    Review of Systems  Review of Systems   Constitutional:  Negative for malaise/fatigue.   Respiratory:  Negative for shortness of breath.    Gastrointestinal:  Negative for abdominal pain, blood in stool, heartburn, melena and nausea.   Genitourinary:  Negative for dysuria.   Neurological:  Positive for headaches.   Psychiatric/Behavioral:  The patient is not nervous/anxious.         Physical Exam  Temp:  [36.3 °C (97.3 °F)-37.2 °C (99 °F)] 37.1 °C (98.8 °F)  Pulse:  [48-74] 53  Resp:  [9-34] 10  BP: (107-167)/(57-89) 120/70  SpO2:  [91 %-100 %] 95  %    Physical Exam  Vitals reviewed.   Constitutional:       Appearance: He is not ill-appearing.   HENT:      Head: Normocephalic.      Comments: Frontal head laceration w/o bleeding, swelling, nor erythema     Nose: Nose normal.   Eyes:      Conjunctiva/sclera: Conjunctivae normal.   Cardiovascular:      Rate and Rhythm: Normal rate and regular rhythm.      Pulses: Normal pulses.      Heart sounds: No murmur heard.  Pulmonary:      Effort: Pulmonary effort is normal. No respiratory distress.      Breath sounds: Normal breath sounds.   Abdominal:      General: Bowel sounds are normal. There is no distension.   Musculoskeletal:      Cervical back: Neck supple.      Right lower leg: No edema.      Left lower leg: No edema.   Lymphadenopathy:      Cervical: No cervical adenopathy.   Neurological:      General: No focal deficit present.      Mental Status: He is alert and oriented to person, place, and time.      Cranial Nerves: No cranial nerve deficit.   Psychiatric:         Mood and Affect: Mood normal.         Thought Content: Thought content normal.         Fluids    Intake/Output Summary (Last 24 hours) at 1/12/2025 0710  Last data filed at 1/12/2025 0601  Gross per 24 hour   Intake 3959.58 ml   Output 2050 ml   Net 1909.58 ml        Laboratory  Recent Labs     01/10/25  1938 01/10/25  2134 01/11/25  0515 01/11/25  1235 01/11/25  1950 01/12/25  0455   WBC 6.1  --  4.4*  --   --  4.9   RBC 2.24*  --  2.16*  --   --  2.32*   HEMOGLOBIN 7.2*   < > 6.9* 8.1* 7.7* 7.6*   HEMATOCRIT 20.8*  --  19.9*  --   --  22.0*   MCV 92.9  --  92.1  --   --  94.8   MCH 32.1  --  31.9  --   --  32.8   MCHC 34.6  --  34.7  --   --  34.5   RDW 48.2  --  50.9*  --   --  52.5*   PLATELETCT 171  --  111*  --   --  103*   MPV 9.7  --  9.8  --   --  9.9    < > = values in this interval not displayed.     Recent Labs     01/10/25  1938 01/11/25  0515 01/12/25  0455   SODIUM 132* 136 130*   POTASSIUM 3.8 3.5* 3.3*   CHLORIDE 100 106 102    CO2 19* 18* 19*   GLUCOSE 188* 133* 128*   BUN 28* 18 7*   CREATININE 0.63 0.57 0.66   CALCIUM 7.7* 7.0* 7.6*     Recent Labs     01/10/25  1938   APTT 20.9*   INR 1.07               Imaging  US-RUQ   Final Result      1.  Hepatomegaly and hepatic steatosis      CT-OUTSIDE IMAGES-CT CHEST/ABDOMEN/PELVIS   Final Result           Assessment/Plan  * GI bleed- (present on admission)  Assessment & Plan  1/12  EGD showed esphogeal tear  PPI IV BID  S/p transfusions of pRBC  Monitor cbc  GI consulting  Discussed holding off on NSAIDs, ASA, alcohol, hot beverages, acidic foods/drinks.      Esophageal tear, initial encounter- (present on admission)  Assessment & Plan  IV PPI BID for a total of 72 hours IV PPI, then transition to oral PPI  Monitor cbc  Advance liquid diet as tolerates    Acute blood loss anemia  Assessment & Plan  Multiple transfusions from outside facility and here.  Monitor hemoglobin every hours transfuse less than 7  1/11 EGD showed esophageal GE junction tear requiring 2 clips.  Was using much ibuprofen and was drinking more readily a week ago  Monitor cbc  IV PPI    Alcohol abuse  Assessment & Plan  Patient will be admitted to the telemetry unit with close cardiac monitoring on CIWA protocol  Started on rally bag, multivitamin, thiamine and folate  Replete electrolytes  Patient has been counseled on alcohol cessation and will be provided with information for outpatient detox facilities  1/12 Again cessation encouraged      Essential hypertension- (present on admission)  Assessment & Plan  1/12  Hold blood pressure medications in face of GI bleed  Restart lisinopril as BP allows.  Monitor vitals         VTE prophylaxis:   SCDs/TEDs      I have performed a physical exam and reviewed and updated ROS and Plan today (1/12/2025). In review of yesterday's note (1/11/2025), there are no changes except as documented above.

## 2025-01-13 PROBLEM — E87.6 HYPOKALEMIA: Status: ACTIVE | Noted: 2025-01-13

## 2025-01-13 LAB
ANION GAP SERPL CALC-SCNC: 11 MMOL/L (ref 7–16)
BUN SERPL-MCNC: 3 MG/DL (ref 8–22)
CALCIUM SERPL-MCNC: 8.3 MG/DL (ref 8.5–10.5)
CHLORIDE SERPL-SCNC: 100 MMOL/L (ref 96–112)
CO2 SERPL-SCNC: 21 MMOL/L (ref 20–33)
CREAT SERPL-MCNC: 0.75 MG/DL (ref 0.5–1.4)
ERYTHROCYTE [DISTWIDTH] IN BLOOD BY AUTOMATED COUNT: 50.9 FL (ref 35.9–50)
GFR SERPLBLD CREATININE-BSD FMLA CKD-EPI: 106 ML/MIN/1.73 M 2
GLUCOSE SERPL-MCNC: 147 MG/DL (ref 65–99)
HCT VFR BLD AUTO: 24.2 % (ref 42–52)
HGB BLD-MCNC: 8.2 G/DL (ref 14–18)
MAGNESIUM SERPL-MCNC: 2 MG/DL (ref 1.5–2.5)
MCH RBC QN AUTO: 32.4 PG (ref 27–33)
MCHC RBC AUTO-ENTMCNC: 33.9 G/DL (ref 32.3–36.5)
MCV RBC AUTO: 95.7 FL (ref 81.4–97.8)
PLATELET # BLD AUTO: 144 K/UL (ref 164–446)
PMV BLD AUTO: 9.9 FL (ref 9–12.9)
POTASSIUM SERPL-SCNC: 3.1 MMOL/L (ref 3.6–5.5)
RBC # BLD AUTO: 2.53 M/UL (ref 4.7–6.1)
SODIUM SERPL-SCNC: 132 MMOL/L (ref 135–145)
WBC # BLD AUTO: 5.3 K/UL (ref 4.8–10.8)

## 2025-01-13 PROCEDURE — 770001 HCHG ROOM/CARE - MED/SURG/GYN PRIV*

## 2025-01-13 PROCEDURE — 700102 HCHG RX REV CODE 250 W/ 637 OVERRIDE(OP): Performed by: HOSPITALIST

## 2025-01-13 PROCEDURE — 85027 COMPLETE CBC AUTOMATED: CPT

## 2025-01-13 PROCEDURE — 83735 ASSAY OF MAGNESIUM: CPT

## 2025-01-13 PROCEDURE — 80048 BASIC METABOLIC PNL TOTAL CA: CPT

## 2025-01-13 PROCEDURE — A9270 NON-COVERED ITEM OR SERVICE: HCPCS | Performed by: HOSPITALIST

## 2025-01-13 PROCEDURE — 700111 HCHG RX REV CODE 636 W/ 250 OVERRIDE (IP): Performed by: HOSPITALIST

## 2025-01-13 PROCEDURE — 97535 SELF CARE MNGMENT TRAINING: CPT

## 2025-01-13 PROCEDURE — 99233 SBSQ HOSP IP/OBS HIGH 50: CPT | Performed by: HOSPITALIST

## 2025-01-13 RX ORDER — LISINOPRIL 10 MG/1
10 TABLET ORAL TWICE DAILY
Status: DISCONTINUED | OUTPATIENT
Start: 2025-01-13 | End: 2025-01-15 | Stop reason: HOSPADM

## 2025-01-13 RX ORDER — POTASSIUM CHLORIDE 1500 MG/1
20 TABLET, EXTENDED RELEASE ORAL ONCE
Status: COMPLETED | OUTPATIENT
Start: 2025-01-13 | End: 2025-01-13

## 2025-01-13 RX ORDER — POTASSIUM CHLORIDE 1500 MG/1
40 TABLET, EXTENDED RELEASE ORAL ONCE
Status: COMPLETED | OUTPATIENT
Start: 2025-01-13 | End: 2025-01-13

## 2025-01-13 RX ADMIN — PANTOPRAZOLE SODIUM 40 MG: 40 INJECTION, POWDER, FOR SOLUTION INTRAVENOUS at 17:25

## 2025-01-13 RX ADMIN — Medication 100 MG: at 05:08

## 2025-01-13 RX ADMIN — ACETAMINOPHEN 650 MG: 325 TABLET ORAL at 12:38

## 2025-01-13 RX ADMIN — POTASSIUM CHLORIDE 20 MEQ: 1500 TABLET, EXTENDED RELEASE ORAL at 17:25

## 2025-01-13 RX ADMIN — SERTRALINE HYDROCHLORIDE 100 MG: 100 TABLET, FILM COATED ORAL at 05:08

## 2025-01-13 RX ADMIN — ACETAMINOPHEN 650 MG: 325 TABLET ORAL at 07:08

## 2025-01-13 RX ADMIN — THERA TABS 1 TABLET: TAB at 05:08

## 2025-01-13 RX ADMIN — LISINOPRIL 10 MG: 10 TABLET ORAL at 12:38

## 2025-01-13 RX ADMIN — POTASSIUM CHLORIDE 40 MEQ: 1500 TABLET, EXTENDED RELEASE ORAL at 12:38

## 2025-01-13 RX ADMIN — PANTOPRAZOLE SODIUM 40 MG: 40 INJECTION, POWDER, FOR SOLUTION INTRAVENOUS at 05:08

## 2025-01-13 RX ADMIN — FOLIC ACID 1 MG: 1 TABLET ORAL at 05:08

## 2025-01-13 ASSESSMENT — PAIN DESCRIPTION - PAIN TYPE
TYPE: ACUTE PAIN

## 2025-01-13 ASSESSMENT — ENCOUNTER SYMPTOMS
NERVOUS/ANXIOUS: 0
SPEECH CHANGE: 0
HEARTBURN: 0
ABDOMINAL PAIN: 0
BLOOD IN STOOL: 0
FOCAL WEAKNESS: 0
NAUSEA: 0
HEADACHES: 0
SHORTNESS OF BREATH: 0

## 2025-01-13 ASSESSMENT — FIBROSIS 4 INDEX: FIB4 SCORE: 3.08

## 2025-01-13 NOTE — PROGRESS NOTES
"Hospital Medicine Daily Progress Note    Date of Service  1/13/2025    Chief Complaint  Upper GI bleed transferred from [facility]    Hospital Course  Keenan Morgan is a 54 y.o. male w/ a hx of EtOH, HTN that was admitted 1/10/2025 with bright red blood per rectum along with syncopal episodes at home with a subsequent laceration to his head. Report of ibuprofen use and daily beers.   At [facility] he received 2 units pRBC and another in route to [facility].  In the [facility] ER initial Hgb:7.2.  Gastroenterology consulting for EGD    Interval Problem Update  1/13:     1/12: EGD showed esophageal GE junction tear  and had 2 clips. Hgb:7.6, K:3.3, Na:130.  No further melena.  Tolerating clear liquids.  Adjust PPI to IV BID x 72hrs total IV PPI then to oral. DIscussed no hot beverages, alcohol, nor acidic foods/drinks.    1/11: EGD pending.  Another 1 unit pRBCs transfused overnight. Hgb:6.9, Plt:111, K:3.5    I have discussed this patient's plan of care and discharge plan at IDT rounds today with Case Management, Nursing, Nursing leadership, and other members of the IDT team.    Consultants/Specialty  GI    Code Status  Full Code    Disposition  The patient is not medically cleared for discharge to home or a post-acute facility.  Anticipate discharge to: home with close outpatient follow-up    I have placed the appropriate orders for post-discharge needs.    Review of Systems  Review of Systems   Constitutional:  Negative for malaise/fatigue.   Respiratory:  Negative for shortness of breath.    Gastrointestinal:  Positive for melena (\"dark stool but firming up\"). Negative for abdominal pain, blood in stool, heartburn and nausea.   Genitourinary:  Negative for dysuria.   Neurological:  Negative for speech change, focal weakness and headaches.   Psychiatric/Behavioral:  The patient is not nervous/anxious.         Physical Exam  Temp:  [36.2 °C (97.1 °F)-36.4 °C (97.5 °F)] 36.4 °C (97.5 °F)  Pulse:  " [48-76] 72  Resp:  [9-43] 20  BP: (112-141)/(57-78) 141/78  SpO2:  [90 %-96 %] 93 %    Physical Exam  Vitals reviewed.   Constitutional:       Appearance: He is not ill-appearing.   HENT:      Head: Normocephalic.      Comments: Frontal head laceration w/o bleeding, swelling, nor erythema     Nose: Nose normal.   Eyes:      General:         Right eye: No discharge.         Left eye: No discharge.      Conjunctiva/sclera: Conjunctivae normal.   Cardiovascular:      Rate and Rhythm: Normal rate and regular rhythm.      Pulses: Normal pulses.      Heart sounds: No murmur heard.  Pulmonary:      Effort: Pulmonary effort is normal. No respiratory distress.      Breath sounds: Normal breath sounds.   Abdominal:      General: Bowel sounds are normal. There is no distension.   Musculoskeletal:      Cervical back: Normal range of motion.      Right lower leg: No edema.      Left lower leg: No edema.   Neurological:      General: No focal deficit present.      Mental Status: He is alert and oriented to person, place, and time.      Cranial Nerves: No cranial nerve deficit.   Psychiatric:         Mood and Affect: Mood normal.         Thought Content: Thought content normal.         Fluids    Intake/Output Summary (Last 24 hours) at 1/13/2025 0719  Last data filed at 1/13/2025 0530  Gross per 24 hour   Intake 5296.12 ml   Output 4550 ml   Net 746.12 ml        Laboratory  Recent Labs     01/10/25  1938 01/10/25  2134 01/11/25  0515 01/11/25  1235 01/11/25  1950 01/12/25  0455 01/12/25  1305   WBC 6.1  --  4.4*  --   --  4.9  --    RBC 2.24*  --  2.16*  --   --  2.32*  --    HEMOGLOBIN 7.2*   < > 6.9*   < > 7.7* 7.6* 8.0*   HEMATOCRIT 20.8*  --  19.9*  --   --  22.0*  --    MCV 92.9  --  92.1  --   --  94.8  --    MCH 32.1  --  31.9  --   --  32.8  --    MCHC 34.6  --  34.7  --   --  34.5  --    RDW 48.2  --  50.9*  --   --  52.5*  --    PLATELETCT 171  --  111*  --   --  103*  --    MPV 9.7  --  9.8  --   --  9.9  --     < > =  values in this interval not displayed.     Recent Labs     01/10/25  1938 01/11/25  0515 01/12/25  0455   SODIUM 132* 136 130*   POTASSIUM 3.8 3.5* 3.3*   CHLORIDE 100 106 102   CO2 19* 18* 19*   GLUCOSE 188* 133* 128*   BUN 28* 18 7*   CREATININE 0.63 0.57 0.66   CALCIUM 7.7* 7.0* 7.6*     Recent Labs     01/10/25  1938   APTT 20.9*   INR 1.07               Imaging  US-RUQ   Final Result      1.  Hepatomegaly and hepatic steatosis      CT-OUTSIDE IMAGES-CT CHEST/ABDOMEN/PELVIS   Final Result           Assessment/Plan  * GI bleed- (present on admission)  Assessment & Plan  1/13  EGD showed esphogeal tear  PPI IV BID  S/p transfusions of pRBC  Monitor cbc  GI consulting  Discussed holding off on NSAIDs, ASA, alcohol, hot beverages, acidic foods/drinks.      Hypokalemia  Assessment & Plan  Oral Kdur 40mEq ordered  Check Mg level  1/14 am bmp    Esophageal tear, initial encounter- (present on admission)  Assessment & Plan  1/13  Continue IV PPI BID for a total of 72 hours IV PPI, then transition to oral PPI on 1/14  Monitor cbc Hgb:8.2  Advance liquid diet as tolerates    Acute blood loss anemia  Assessment & Plan  1/13:  Multiple transfusions from outside facility and here.  Monitor hemoglobin every hours transfuse less than 7  1/11 EGD showed esophageal GE junction tear requiring 2 clips.  Was using much ibuprofen and was drinking more readily a week ago  Monitor cbc  IV PPI through 1/14 and severity of GI bleed,  recommendation of transition to oral PPI 1/14  Hgb:8.2  Encourage spinach, liver once able at home and future outpatient cbc    Alcohol abuse  Assessment & Plan  1/13 No EtOH withdrawal.  Cessation advised due to harms of further GIB  Patient will be admitted to the telemetry unit with close cardiac monitoring on CIWA protocol  Started on rally bag, multivitamin, thiamine and folate    Essential hypertension- (present on admission)  Assessment & Plan  1/13  Hold blood pressure medications in face of GI  bleed  Restarted lisinopril 1/13 with parameters.  Monitor vitals         VTE prophylaxis:   SCDs/TEDs      I have performed a physical exam and reviewed and updated ROS and Plan today (1/13/2025). In review of yesterday's note (1/12/2025), there are no changes except as documented above.

## 2025-01-13 NOTE — WOUND TEAM
[facility]  Inpatient Services  Wound and Skin Care Brief Evaluation    Admission Date: 1/10/2025     Last order of IP CONSULT TO WOUND CARE was found on 1/11/2025 from Hospital Encounter on 1/10/2025     HPI, PMH, SH: Reviewed    Chief Complaint   Patient presents with    Upper GI Bleed     Pt transferred from [facility] for GI bleed     Diagnosis: GI bleed [K92.2]  Esophageal tear, initial encounter [S11.21XA]    Unit where seen by Wound Team: [id number]     Wound consult placed regarding forehead LAC. Chart and images reviewed. This discussed with bedside RN. This clinician in to assess patient. Patient pleasant and agreeable. Patient's forehead with clean LAC and sutures in place. Left open to air. Non-selectively debrided with N/A.     BL Heels and sacrum intact. Continue with offloading measures.     No pressure injuries or advanced wound care needs identified. Wound consult completed. No further follow up unless indicated and consulted.          PREVENTATIVE INTERVENTIONS:    Q shift Roberto - performed per nursing policy  Q shift pressure point assessments - performed per nursing policy    Surface/Positioning  ICU Low Airloss - Currently in Place  Reposition q 2 hours - Currently in Place    Offloading/Redistribution  Heel offloading dressing (Silicone dressing) - Currently in Place

## 2025-01-13 NOTE — CARE PLAN
The patient is Watcher - Medium risk of patient condition declining or worsening    Shift Goals  Clinical Goals: hemodynamically stable  Patient Goals: go home  Family Goals: STEPHEN      Problem: Skin Integrity  Goal: Skin integrity is maintained or improved  Outcome: Progressing     Problem: Fall Risk  Goal: Patient will remain free from falls  Outcome: Progressing     Problem: Pain - Standard  Goal: Alleviation of pain or a reduction in pain to the patient’s comfort goal  Outcome: Progressing

## 2025-01-13 NOTE — THERAPY
"Physical Therapy Contact Note    Patient Name: Keenan Morgan  Age:  54 y.o., Sex:  male  Medical Record #: [id number]  Today's Date: 1/13/2025    Discussed with nsg, no inpt PT indicated, but pt should be up to walk in hallway with nsg supervision to maintain endurance. Nsg agreeable.        01/13/25 0918   Interdisciplinary Plan of Care Collaboration   IDT Collaboration with  Nursing   Collaboration Comments Met with pt in bed, has been up independently to/from bathroom, yesterday \"I helped the nurse make the bed\". Pt lives alone, indep with adls and drives at baseline. Pt reports having assist from neighbors/friends if needed. Pt was educated that he can be up to walk the halls with nsg supervision to maintain his endurance while here. Pt is alert, agreeable, motivated. Pt does not appear to have inpt PT needs.       "

## 2025-01-13 NOTE — CARE PLAN
The patient is Stable - Low risk of patient condition declining or worsening    Shift Goals  Clinical Goals: Increase ambulation  Patient Goals: Go home  Family Goals: STEPHEN    Progress made toward(s) clinical / shift goals:    Problem: Psychosocial  Goal: Patient's level of anxiety will decrease  Outcome: Progressing     Problem: Skin Integrity  Goal: Skin integrity is maintained or improved  Outcome: Progressing     Problem: Fall Risk  Goal: Patient will remain free from falls  Outcome: Progressing     Problem: Pain - Standard  Goal: Alleviation of pain or a reduction in pain to the patient’s comfort goal  Outcome: Progressing       Patient is not progressing towards the following goals:

## 2025-01-14 LAB
ALBUMIN SERPL BCP-MCNC: 3.6 G/DL (ref 3.2–4.9)
ALBUMIN/GLOB SERPL: 2 G/DL
ALP SERPL-CCNC: 21 U/L (ref 30–99)
ALT SERPL-CCNC: 19 U/L (ref 2–50)
ANION GAP SERPL CALC-SCNC: 9 MMOL/L (ref 7–16)
AST SERPL-CCNC: 17 U/L (ref 12–45)
BILIRUB SERPL-MCNC: 0.2 MG/DL (ref 0.1–1.5)
BUN SERPL-MCNC: 5 MG/DL (ref 8–22)
CALCIUM ALBUM COR SERPL-MCNC: 8.8 MG/DL (ref 8.5–10.5)
CALCIUM SERPL-MCNC: 8.5 MG/DL (ref 8.5–10.5)
CHLORIDE SERPL-SCNC: 103 MMOL/L (ref 96–112)
CO2 SERPL-SCNC: 25 MMOL/L (ref 20–33)
CREAT SERPL-MCNC: 0.69 MG/DL (ref 0.5–1.4)
ERYTHROCYTE [DISTWIDTH] IN BLOOD BY AUTOMATED COUNT: 50.1 FL (ref 35.9–50)
GFR SERPLBLD CREATININE-BSD FMLA CKD-EPI: 109 ML/MIN/1.73 M 2
GLOBULIN SER CALC-MCNC: 1.8 G/DL (ref 1.9–3.5)
GLUCOSE SERPL-MCNC: 108 MG/DL (ref 65–99)
HCT VFR BLD AUTO: 25.7 % (ref 42–52)
HGB BLD-MCNC: 8.7 G/DL (ref 14–18)
MCH RBC QN AUTO: 31.9 PG (ref 27–33)
MCHC RBC AUTO-ENTMCNC: 33.9 G/DL (ref 32.3–36.5)
MCV RBC AUTO: 94.1 FL (ref 81.4–97.8)
PLATELET # BLD AUTO: 173 K/UL (ref 164–446)
PMV BLD AUTO: 10.4 FL (ref 9–12.9)
POTASSIUM SERPL-SCNC: 3.8 MMOL/L (ref 3.6–5.5)
PROT SERPL-MCNC: 5.4 G/DL (ref 6–8.2)
RBC # BLD AUTO: 2.73 M/UL (ref 4.7–6.1)
SODIUM SERPL-SCNC: 137 MMOL/L (ref 135–145)
WBC # BLD AUTO: 4.6 K/UL (ref 4.8–10.8)

## 2025-01-14 PROCEDURE — 99233 SBSQ HOSP IP/OBS HIGH 50: CPT | Performed by: INTERNAL MEDICINE

## 2025-01-14 PROCEDURE — 36415 COLL VENOUS BLD VENIPUNCTURE: CPT

## 2025-01-14 PROCEDURE — A9270 NON-COVERED ITEM OR SERVICE: HCPCS | Performed by: HOSPITALIST

## 2025-01-14 PROCEDURE — 770001 HCHG ROOM/CARE - MED/SURG/GYN PRIV*

## 2025-01-14 PROCEDURE — RXMED WILLOW AMBULATORY MEDICATION CHARGE: Performed by: INTERNAL MEDICINE

## 2025-01-14 PROCEDURE — 700102 HCHG RX REV CODE 250 W/ 637 OVERRIDE(OP): Performed by: HOSPITALIST

## 2025-01-14 PROCEDURE — 80053 COMPREHEN METABOLIC PANEL: CPT

## 2025-01-14 PROCEDURE — 85027 COMPLETE CBC AUTOMATED: CPT

## 2025-01-14 PROCEDURE — 700111 HCHG RX REV CODE 636 W/ 250 OVERRIDE (IP): Performed by: HOSPITALIST

## 2025-01-14 RX ORDER — ACETAMINOPHEN 325 MG/1
650 TABLET ORAL EVERY 4 HOURS PRN
COMMUNITY
Start: 2025-01-14

## 2025-01-14 RX ADMIN — ACETAMINOPHEN 650 MG: 325 TABLET ORAL at 16:12

## 2025-01-14 RX ADMIN — SERTRALINE HYDROCHLORIDE 100 MG: 100 TABLET, FILM COATED ORAL at 04:48

## 2025-01-14 RX ADMIN — FOLIC ACID 1 MG: 1 TABLET ORAL at 04:49

## 2025-01-14 RX ADMIN — THERA TABS 1 TABLET: TAB at 04:48

## 2025-01-14 RX ADMIN — LISINOPRIL 10 MG: 10 TABLET ORAL at 04:48

## 2025-01-14 RX ADMIN — Medication 100 MG: at 04:48

## 2025-01-14 RX ADMIN — PANTOPRAZOLE SODIUM 40 MG: 40 INJECTION, POWDER, FOR SOLUTION INTRAVENOUS at 04:48

## 2025-01-14 RX ADMIN — PANTOPRAZOLE SODIUM 40 MG: 40 INJECTION, POWDER, FOR SOLUTION INTRAVENOUS at 16:12

## 2025-01-14 RX ADMIN — LISINOPRIL 10 MG: 10 TABLET ORAL at 16:12

## 2025-01-14 ASSESSMENT — COGNITIVE AND FUNCTIONAL STATUS - GENERAL
SUGGESTED CMS G CODE MODIFIER MOBILITY: CH
MOBILITY SCORE: 24
SUGGESTED CMS G CODE MODIFIER DAILY ACTIVITY: CH
DAILY ACTIVITIY SCORE: 24

## 2025-01-14 ASSESSMENT — ENCOUNTER SYMPTOMS
BLOOD IN STOOL: 0
SHORTNESS OF BREATH: 0
HEADACHES: 0
HEARTBURN: 0
ABDOMINAL PAIN: 0
NERVOUS/ANXIOUS: 0
NAUSEA: 0

## 2025-01-14 ASSESSMENT — PAIN DESCRIPTION - PAIN TYPE
TYPE: ACUTE PAIN

## 2025-01-14 NOTE — PROGRESS NOTES
4 Eyes Skin Assessment Completed by PEE Yadav and PEE Mancuso.    Head R side forehead;laceration Scab, Scratch, and Redness  Ears WDL  Nose WDL  Mouth WDL  Neck WDL  Breast/Chest WDL  Shoulder Blades WDL  Spine WDL  (R) Arm/Elbow/Hand WDL  (L) Arm/Elbow/Hand WDL  Abdomen WDL  Groin WDL  Scrotum/Coccyx/Buttocks Redness and Blanching  (R) Leg Scab and Abrasion  (L) Leg WDL  (R) Heel/Foot/Toe Redness and Blanching  (L) Heel/Foot/Toe Redness and Blanching abrasion top of foot           Devices In Places Blood Pressure Cuff and Pulse Ox      Interventions In Place Pillows    Possible Skin Injury No    Pictures Uploaded Into Epic N/A  Wound Consult Placed N/A  RN Wound Prevention Protocol Ordered No

## 2025-01-14 NOTE — DISCHARGE PLANNING
Case Management Discharge Planning    Admission Date: 1/10/2025  GMLOS: 3  ALOS: 4    Anticipated Discharge Dispo: Discharge Disposition: Discharged to home/self care (01)  Discharge Address: [place] [address]  Discharge Contact Phone Number: [phone]    DME Needed: No    Action(s) Taken: Patient was discussed in IDT rounds.  Per MD, patient needs his last dose of IV protonix at 1800, and then patient can DC home. Per MD, patient does not have house keys/ride home tonight.  His friend will be able to pick him up 1/15/25 at 0800 with his house keys.      Escalations Completed: None    Medically Clear: No    Next Steps: RN CM to continue to follow for DC planning      Barriers to Discharge: Medical clearance    Is the patient up for discharge tomorrow: Yes    Care Transition Team Assessment    RNCM met with patient at the bedside to complete assessment.  Patient A&Ox4 and able to verify the information on the face sheet.  Patient lives in a single story home.  Patient lives alone and able to care for self independently.  Patient has support from ex-wife and friends.  Pt has [employer] for medical coverage.  Pt's PCP is through a physicians clinic in [facility].  PCPC first name is Siomara (pt unsure of last name of provider).  Pt does not use DME or home oxygen at baseline.  Pt denies smoking or drug use.  Patient stated he has been sober for the last 20 years, but has slipped up and has been drinking ETOH for the past week.  Pt denies mental health concerns.     Information Source  Orientation Level: Oriented X4  Information Given By: Patient  Who is responsible for making decisions for patient? : Patient    Readmission Evaluation  Is this a readmission?: No    Elopement Risk  Legal Hold: No  Ambulatory or Self Mobile in Wheelchair: Yes  Disoriented: No  Psychiatric Symptoms: None  History of Wandering: No  Elopement this Admit: No  Vocalizing Wanting to Leave: No  Displays Behaviors, Body Language  Wanting to Leave: No-Not at Risk for Elopement  Elopement Risk: Not at Risk for Elopement    Interdisciplinary Discharge Planning  Lives with - Patient's Self Care Capacity: Alone and Able to Care For Self  Patient or legal guardian wants to designate a caregiver: No  Support Systems: Friends / Neighbors  Housing / Facility: Mobile Home    Discharge Preparedness  What is your plan after discharge?: Home with help  What are your discharge supports?: Other (comment) (friends, x spouse)  Prior Functional Level: Ambulatory, Independent with Activities of Daily Living  Difficulity with ADLs: None  Difficulity with IADLs: None    Functional Assesment  Prior Functional Level: Ambulatory, Independent with Activities of Daily Living    Finances  Financial Barriers to Discharge: No  Prescription Coverage: Yes    Vision / Hearing Impairment  Vision Impairment : Yes  Right Eye Vision: Impaired, Wears Glasses  Left Eye Vision: Impaired, Wears Glasses  Hearing Impairment : No         Advance Directive  Advance Directive?: None    Domestic Abuse  Have you ever been the victim of abuse or violence?: No  Possible Abuse/Neglect Reported to:: Not Applicable    Psychological Assessment  History of Substance Abuse: Alcohol  Date Last Used - Alcohol: 1/09/2025  History of Psychiatric Problems: No  Non-compliant with Treatment: No  Newly Diagnosed Illness: Yes    Discharge Risks or Barriers  Discharge risks or barriers?: Complex medical needs  Patient risk factors: Complex medical needs    Anticipated Discharge Information  Discharge Disposition: Discharged to home/self care (01)  Discharge Address:  [place] [address]  Discharge Contact Phone Number: [phone]

## 2025-01-14 NOTE — PROGRESS NOTES
Hospital Medicine Daily Progress Note    Date of Service  1/14/2025    Chief Complaint  [place] [facility]    [place]ospital Course  Keenan Morgan is a 54 y.o. male w/ a hx of EtOH, HTN that was admitted 1/10/2025 with bright red blood per rectum along with syncopal episodes at home with a subsequent laceration to his head. Report of ibuprofen use and daily beers.   At [facility] he received 2 units pRBC and another in route to [facility].  In the [facility] ER initial Hgb:7.2.  Gastroenterology consulted took patient for EGD 1/11 found GERD with a tear at GE junction status post 2 clips recommended IV PPI for a total of 3 days then changed to oral omeprazole twice daily for 8 weeks.    Interval Problem Update  1/14 vital stable  Hemoglobin stable 8.7, renal function stable  Patient denies further melena, is tolerating a diet.  Denies any abdominal pain or dyspnea.  No signs of alcohol withdrawal, discontinue CIWA protocol  Patient with last dose of IV PPI this evening, plan for discharge first thing in the morning  I have placed a referral for outpatient follow-up with gastroenterology for consideration of repeat EGD to document healing    I have discussed this patient's plan of care and discharge plan at IDT rounds today with Case Management, Nursing, Nursing leadership, and other members of the IDT team.    Consultants/Specialty  GI    Code Status  Full Code    Disposition  The patient is not medically cleared for discharge to home or a post-acute facility.  Anticipate discharge to: home with close outpatient follow-up    I have placed the appropriate orders for post-discharge needs.    Review of Systems  Review of Systems   Constitutional:  Negative for malaise/fatigue.   Respiratory:  Negative for shortness of breath.    Gastrointestinal:  Negative for abdominal pain, blood in stool, heartburn, melena and nausea.   Genitourinary:  Negative for dysuria.   Neurological:   Negative for headaches.   Psychiatric/Behavioral:  The patient is not nervous/anxious.         Physical Exam  Temp:  [36.5 °C (97.7 °F)-37 °C (98.6 °F)] 36.5 °C (97.7 °F)  Pulse:  [59-66] 59  Resp:  [16-26] 16  BP: (122-143)/(58-77) 135/77  SpO2:  [96 %-99 %] 98 %    Physical Exam  Vitals and nursing note reviewed.   Constitutional:       General: He is not in acute distress.     Appearance: He is not ill-appearing.   HENT:      Head: Normocephalic.      Comments: Frontal head laceration w/o bleeding, swelling, nor erythema     Mouth/Throat:      Pharynx: Oropharynx is clear.   Eyes:      General:         Right eye: No discharge.         Left eye: No discharge.      Conjunctiva/sclera: Conjunctivae normal.   Cardiovascular:      Rate and Rhythm: Normal rate and regular rhythm.      Pulses: Normal pulses.      Heart sounds: No murmur heard.  Pulmonary:      Effort: Pulmonary effort is normal. No respiratory distress.      Breath sounds: Normal breath sounds. No wheezing.   Abdominal:      General: There is no distension.      Palpations: Abdomen is soft.      Tenderness: There is no abdominal tenderness.   Musculoskeletal:      Cervical back: Normal range of motion.      Right lower leg: No edema.      Left lower leg: No edema.   Neurological:      General: No focal deficit present.      Mental Status: He is alert and oriented to person, place, and time.      Cranial Nerves: No cranial nerve deficit.   Psychiatric:         Mood and Affect: Mood normal.         Thought Content: Thought content normal.         Fluids  No intake or output data in the 24 hours ending 01/14/25 1548       Laboratory  Recent Labs     01/12/25  0455 01/12/25  1305 01/13/25  1043 01/14/25  0603   WBC 4.9  --  5.3 4.6*   RBC 2.32*  --  2.53* 2.73*   HEMOGLOBIN 7.6* 8.0* 8.2* 8.7*   HEMATOCRIT 22.0*  --  24.2* 25.7*   MCV 94.8  --  95.7 94.1   MCH 32.8  --  32.4 31.9   MCHC 34.5  --  33.9 33.9   RDW 52.5*  --  50.9* 50.1*   PLATELETCT 103*  --   144* 173   MPV 9.9  --  9.9 10.4     Recent Labs     01/12/25  0455 01/13/25  1043 01/14/25  0603   SODIUM 130* 132* 137   POTASSIUM 3.3* 3.1* 3.8   CHLORIDE 102 100 103   CO2 19* 21 25   GLUCOSE 128* 147* 108*   BUN 7* 3* 5*   CREATININE 0.66 0.75 0.69   CALCIUM 7.6* 8.3* 8.5                     Imaging  US-RUQ   Final Result      1.  Hepatomegaly and hepatic steatosis      CT-OUTSIDE IMAGES-CT CHEST/ABDOMEN/PELVIS   Final Result           Assessment/Plan  * GI bleed- (present on admission)  Assessment & Plan  EGD showed esphogeal tear  PPI IV BID  S/p transfusions of pRBC  Monitor cbc  GI consulting  Discussed holding off on NSAIDs, ASA, alcohol, hot beverages, acidic foods/drinks.      Hypokalemia  Assessment & Plan  Replace as needed    Improving    Esophageal tear, initial encounter- (present on admission)  Assessment & Plan  Continue IV PPI BID for a total of 72 hours IV PPI, then transition to oral PPI on 1/14  Monitor cbc Hgb:8.2  Advance liquid diet as tolerates    Acute blood loss anemia  Assessment & Plan  1/13:  Multiple transfusions from outside facility and here.  Monitor hemoglobin every hours transfuse less than 7  1/11 EGD showed esophageal GE junction tear requiring 2 clips.  Was using much ibuprofen and was drinking more readily a week ago  Monitor cbc  IV PPI through 1/14 and severity of GI bleed,  recommendation of transition to oral PPI 1/14  Hgb:8.2  Encourage spinach, liver once able at home and future outpatient cbc    Alcohol abuse  Assessment & Plan  No EtOH withdrawal.  Cessation advised due to harms of further GIB  No signs of withdrawal, DC CIWA protocol  S/p rally bag, multivitamin, thiamine and folate    Essential hypertension- (present on admission)  Assessment & Plan  Hold blood pressure medications in face of GI bleed  Restarted lisinopril 1/13 with parameters.  Monitor vitals       Total time spent in chart review, at bedside with the patient, discussing with nursing and case  management: 52 minutes    VTE prophylaxis:   SCDs/TEDs      I have performed a physical exam and reviewed and updated ROS and Plan today (1/14/2025). In review of yesterday's note (1/13/2025), there are no changes except as documented above.

## 2025-01-14 NOTE — CARE PLAN
The patient is Stable - Low risk of patient condition declining or worsening    Shift Goals  Clinical Goals: safety, comfort, pain control  Patient Goals: rest  Family Goals: not present    Progress made toward(s) clinical / shift goals:    Problem: Knowledge Deficit - Standard  Goal: Patient and family/care givers will demonstrate understanding of plan of care, disease process/condition, diagnostic tests and medications  Outcome: Progressing     Problem: Lifestyle Changes  Goal: Patient's ability to identify lifestyle changes and available resources to help reduce recurrence of condition will improve  Outcome: Progressing     Problem: Psychosocial  Goal: Patient's level of anxiety will decrease  Outcome: Progressing

## 2025-01-14 NOTE — CARE PLAN
The patient is Stable - Low risk of patient condition declining or worsening    Shift Goals  Clinical Goals: safety, comfort, monitor H/H and stool  Patient Goals: comfort, rest  Family Goals: not present    Progress made toward(s) clinical / shift goals:    Problem: Knowledge Deficit - Standard  Goal: Patient and family/care givers will demonstrate understanding of plan of care, disease process/condition, diagnostic tests and medications  Outcome: Progressing     Plan of care discussed with patient at beside. Personal belongings and call light with patient in bed. Awaiting final dose of omeprazole this PM. Pt does not have keys to home at this time to allow CM to arrange uber ride to home, pt arranged ride at 0800 tomorrow AM    Problem: Psychosocial  Goal: Patient's level of anxiety will decrease  Outcome: Progressing  Goal: Spiritual and cultural needs incorporated into hospitalization  Outcome: Progressing     Problem: Skin Integrity  Goal: Skin integrity is maintained or improved  Outcome: Progressing     Problem: Fall Risk  Goal: Patient will remain free from falls  Outcome: Progressing     Patient educated to stay in bed and use call light if needing to ambulate in bathroom of have needs addressed. Patient belongings at bedside with patient. Treaded socks placed on pt feet. Bed placed and locked on lowest level. DME placed in pt bathroom to prevent independent ambulation without staff present.     Problem: Pain - Standard  Goal: Alleviation of pain or a reduction in pain to the patient’s comfort goal  Outcome: Progressing       Patient is not progressing towards the following goals:

## 2025-01-15 ENCOUNTER — PHARMACY VISIT (OUTPATIENT)
Dept: PHARMACY | Facility: MEDICAL CENTER | Age: 55
End: 2025-01-15
Payer: COMMERCIAL

## 2025-01-15 VITALS
OXYGEN SATURATION: 98 % | BODY MASS INDEX: 28.06 KG/M2 | DIASTOLIC BLOOD PRESSURE: 66 MMHG | RESPIRATION RATE: 17 BRPM | SYSTOLIC BLOOD PRESSURE: 123 MMHG | HEIGHT: 71 IN | HEART RATE: 61 BPM | TEMPERATURE: 98.8 F | WEIGHT: 200.4 LBS

## 2025-01-15 PROCEDURE — 700102 HCHG RX REV CODE 250 W/ 637 OVERRIDE(OP): Performed by: HOSPITALIST

## 2025-01-15 PROCEDURE — 99239 HOSP IP/OBS DSCHRG MGMT >30: CPT | Performed by: INTERNAL MEDICINE

## 2025-01-15 PROCEDURE — A9270 NON-COVERED ITEM OR SERVICE: HCPCS | Performed by: HOSPITALIST

## 2025-01-15 RX ADMIN — LISINOPRIL 10 MG: 10 TABLET ORAL at 05:38

## 2025-01-15 RX ADMIN — SERTRALINE HYDROCHLORIDE 100 MG: 100 TABLET, FILM COATED ORAL at 05:38

## 2025-01-15 RX ADMIN — OMEPRAZOLE 20 MG: 20 CAPSULE, DELAYED RELEASE ORAL at 05:38

## 2025-01-15 ASSESSMENT — PAIN DESCRIPTION - PAIN TYPE: TYPE: ACUTE PAIN

## 2025-01-15 NOTE — CARE PLAN
The patient is Stable - Low risk of patient condition declining or worsening    Shift Goals  Clinical Goals: Rest, Monitor H/H, Discharge  Patient Goals: Rest, Discharge  Family Goals: not present    Progress made toward(s) clinical / shift goals:      Problem: Knowledge Deficit - Standard  Goal: Patient and family/care givers will demonstrate understanding of plan of care, disease process/condition, diagnostic tests and medications  Outcome: Progressing  Education provided on plan of care this shift. Questions answered. Patient verbalizes understanding.     Problem: Pain - Standard  Goal: Alleviation of pain or a reduction in pain to the patient’s comfort goal  Outcome: Progressing  Patient educated on pain scale. Encouraged patient to verbalize pain.

## 2025-01-15 NOTE — DISCHARGE SUMMARY
Discharge Summary    CHIEF COMPLAINT ON ADMISSION  Chief Complaint   Patient presents with    Upper GI Bleed     Pt transferred from [facility] for GI bleed       Reason for Admission  EMS     Admission Date  1/10/2025    CODE STATUS  Full Code    HPI & HOSPITAL COURSE  This is a 54 y.o. male here with bright red blood per rectum     54 y.o. male w/ a hx of EtOH, HTN that was admitted 1/10/2025 with bright red blood per rectum along with syncopal episodes at home with a subsequent laceration to his head. Report of ibuprofen use and daily beers.  At [facility] he received 2 units pRBC and another in route to [facility].  In the [facility] ER initial Hgb:7.2.  Gastroenterology consulted took patient for EGD 1/11 found GERD with a tear at GE junction status post 2 clips recommended IV PPI for a total of 3 days then changed to oral omeprazole twice daily for 8 weeks. He has been counseled on the importance of alcohol cessation and to stop NSAID/ASA use.  Referral placed for outpatient follow-up with gastroenterology for repeat EGD to document healing.  Patient's hemoglobin and vital signs are stable and he is requesting to be discharged home.  He is to follow-up with his primary care physician and return to the ER if his condition worsens.    Therefore, he is discharged in good and stable condition to home with close outpatient follow-up.    The patient met 2-midnight criteria for an inpatient stay at the time of discharge.    Discharge Date  1/15/2025    FOLLOW UP ITEMS POST DISCHARGE  Follow up with primary care   Referral placed for follow-up with outpatient gastroenterology    DISCHARGE DIAGNOSES  Principal Problem:    GI bleed (POA: Yes)  Active Problems:    Essential hypertension (POA: Yes)    Alcohol abuse (POA: Unknown)    Acute blood loss anemia (POA: Unknown)    Esophageal tear, initial encounter (POA: Yes)    Hypokalemia (POA: Unknown)  Resolved Problems:    * No resolved hospital problems. *      FOLLOW UP  No future  appointments.  [facility]  [address] [facility] [address]  [phone]  Follow up  I placed a referral for you to follow-up with gastroenterology for consideration of repeat endoscopy to evaluate healing.  If there is no gastroenterology near you can call this group or the other group as attached to make an appointment.    [facility]  [address] [facility] [address]  [phone]        Primary care    Follow up  Follow-up with your primary care physician in 1 week      MEDICATIONS ON DISCHARGE     Medication List        START taking these medications        Instructions   acetaminophen 325 MG Tabs  Commonly known as: Tylenol   Take 2 Tablets by mouth every four hours as needed for Fever, Moderate Pain or Mild Pain.  Dose: 650 mg     omeprazole 20 MG delayed-release capsule  Commonly known as: PriLOSEC   Take 1 Capsule by mouth 2 times a day for 60 days.  Dose: 20 mg            CONTINUE taking these medications        Instructions   Allegra Allergy 180 MG tablet  Generic drug: fexofenadine   Take 180 mg by mouth 1 time a day as needed (Allergies).  Dose: 180 mg     cyclobenzaprine 10 mg Tabs  Commonly known as: Flexeril   Take 20 mg by mouth 1 time a day as needed for Muscle Spasms. 2 tablets = 20 mg.  Dose: 20 mg     lisinopril-hydrochlorothiazide 20-12.5 MG per tablet  Commonly known as: Prinzide   Take 1 Tablet by mouth every day.  Dose: 1 Tablet     ondansetron 4 MG Tabs tablet  Commonly known as: Zofran   Take 4 mg by mouth every 6 hours as needed for Nausea/Vomiting.  Dose: 4 mg     sertraline 100 MG Tabs  Commonly known as: Zoloft   Take 100 mg by mouth every day.  Dose: 100 mg     sucralfate 1 GM Tabs  Commonly known as: Carafate   Take 1 g by mouth 4 Times a Day,Before Meals and at Bedtime. 3 day course prescribed 1/9/2025.  Dose: 1 g            STOP taking these medications      ibuprofen 600 MG Tabs  Commonly known as: Motrin     predniSONE 20 MG Tabs  Commonly  "known as: Deltasone              Allergies  Allergies   Allergen Reactions    Aspirin Anaphylaxis     1/10/2025: Patient denies allergy. Reaction documented historically: \"Anaphylaxis\"    Guaifenesin Anaphylaxis    Penicillins Anaphylaxis       DIET  Orders Placed This Encounter   Procedures    Diet Order Diet: Low Fiber(GI Soft)     Standing Status:   Standing     Number of Occurrences:   1     Order Specific Question:   Diet:     Answer:   Low Fiber(GI Soft) [2]    Discontinue Diet Tray     Standing Status:   Standing     Number of Occurrences:   1       ACTIVITY  As tolerated.  Weight bearing as tolerated    CONSULTATIONS  Gastroenterology    PROCEDURES  Esophagogastroduodenoscopy    LABORATORY  Lab Results   Component Value Date    SODIUM 137 01/14/2025    POTASSIUM 3.8 01/14/2025    CHLORIDE 103 01/14/2025    CO2 25 01/14/2025    GLUCOSE 108 (H) 01/14/2025    BUN 5 (L) 01/14/2025    CREATININE 0.69 01/14/2025    CREATININE 0.8 11/12/2008        Lab Results   Component Value Date    WBC 4.6 (L) 01/14/2025    HEMOGLOBIN 8.7 (L) 01/14/2025    HEMATOCRIT 25.7 (L) 01/14/2025    PLATELETCT 173 01/14/2025        Total time of the discharge process exceeds 32 minutes.  "

## 2025-01-15 NOTE — DOCUMENTATION QUERY
[facility]                                                                       Query Response Note      PATIENT:               SHAMAR CONWAY  ACCT #:                  [id number]  MRN:                     [id number]  :                      1970  ADMIT DATE:       1/10/2025 7:15 PM  DISCH DATE:        1/15/2025 7:45 AM  RESPONDING  PROVIDER #:        [id number]           QUERY TEXT:    Labs documented in the Medical Record provide opportunity with a Sodium level of 132 on admission, up to 136 on day 2, and back down to 130 on day 3.     Please clarify the clinical/diagnostic relevance for the documented findings.    Note: If you agree with a diagnosis listed above, please remember to include it in your concurrent daily documentation and onto the Discharge Summary.    The patient's clinical indicators include:  Labs:  Sodium 01/10: 132, : 136, : 130, : 132    Risk Factors: GI bleed, Hypokalemia, Acute blood loss anemia, Alcohol abuse, Essential hypertension    Tx: IV fluid hydration, Monitor BMP    If you have any questions, please contact:  Ever Jones RN CDI [facility]  [email or web address]  Ever Jones [address] [employer]  Options provided:   -- Clinically significant and to be documented as Hyponatremia   -- Other explanation, (Please specify the other explanation)   -- Findings of no clinical significance      Query created by: Ever Jones on 2025 7:13 AM    RESPONSE TEXT:    Clinically significant and to be documented as Hyponatremia          Electronically signed by:  RAIN STEPHENS MD 1/15/2025 3:25 PM

## 2025-01-15 NOTE — PROGRESS NOTES
Report received from noc shift RN. Pt to discharge home. Pt A&Ox4, on room air, no complaints of pain, early breakfast tray delivered. Meds to beds delivered. IV dc'd. Discharge instructions discussed. All questions answered, no other concerns at this time.  Patient agreeable to discharge plan. Patient has all belongings at time of dc. RN escorted via wheelchair, friend to transport home.

## (undated) DEVICE — TUBE CONNECTING SUCTION - CLEAR PLASTIC STERILE 72 IN (50EA/CA)

## (undated) DEVICE — LACTATED RINGERS INJ 1000 ML - (14EA/CA 60CA/PF)

## (undated) DEVICE — SENSOR OXIMETER ADULT SPO2 RD SET (20EA/BX)

## (undated) DEVICE — PORT AUXILLARY WATER (50EA/BX)

## (undated) DEVICE — CLIP HEMOSTASIS ASSURANCE 16MM (10EA/BX)

## (undated) DEVICE — BLOCK BITE MAXI DENTAL RETENTION RIM (100EA/BX)

## (undated) DEVICE — SODIUM CHL IRRIGATION 0.9% 1000ML (12EA/CA)

## (undated) DEVICE — FILM CASSETTE ENDO

## (undated) DEVICE — ELECTRODE 850 FOAM ADHESIVE - HYDROGEL RADIOTRNSPRNT (50/PK)

## (undated) DEVICE — KIT CUSTOM PROCEDURE SINGLE FOR ENDO (15/CA)

## (undated) DEVICE — MASK PANORAMIC OXYGEN PRO2 (30EA/CA)

## (undated) DEVICE — MASK OXYGEN VNYL ADLT MED CONC WITH 7 FOOT TUBING - (50EA/CA)

## (undated) DEVICE — NEPTUNE 4 PORT MANIFOLD - (20/PK)

## (undated) DEVICE — DEVICE HEMOSTATIC CLIPPING RESOLUTION 360 DEGREES (20EA/BX)

## (undated) DEVICE — BUTTON ENDOSCOPY DISPOSABLE

## (undated) DEVICE — SET LEADWIRE 5 LEAD BEDSIDE DISPOSABLE ECG (1SET OF 5/EA)

## (undated) DEVICE — WATER IRRIGATION STERILE 1000ML (12EA/CA)